# Patient Record
Sex: MALE | Race: WHITE | NOT HISPANIC OR LATINO | Employment: UNEMPLOYED | URBAN - METROPOLITAN AREA
[De-identification: names, ages, dates, MRNs, and addresses within clinical notes are randomized per-mention and may not be internally consistent; named-entity substitution may affect disease eponyms.]

---

## 2023-08-14 ENCOUNTER — APPOINTMENT (OUTPATIENT)
Dept: GENERAL RADIOLOGY | Facility: HOSPITAL | Age: 53
End: 2023-08-14
Payer: MEDICAID

## 2023-08-14 ENCOUNTER — APPOINTMENT (OUTPATIENT)
Dept: MRI IMAGING | Facility: HOSPITAL | Age: 53
End: 2023-08-14
Payer: MEDICAID

## 2023-08-14 ENCOUNTER — HOSPITAL ENCOUNTER (OUTPATIENT)
Facility: HOSPITAL | Age: 53
Setting detail: OBSERVATION
Discharge: HOME | End: 2023-08-15
Attending: EMERGENCY MEDICINE
Payer: MEDICAID

## 2023-08-14 ENCOUNTER — APPOINTMENT (OUTPATIENT)
Dept: CT IMAGING | Facility: HOSPITAL | Age: 53
End: 2023-08-14
Payer: MEDICAID

## 2023-08-14 DIAGNOSIS — R41.82 ALTERED MENTAL STATUS, UNSPECIFIED ALTERED MENTAL STATUS TYPE: Primary | ICD-10-CM

## 2023-08-14 LAB
ALBUMIN SERPL-MCNC: 4.4 G/DL (ref 3.5–5.2)
ALBUMIN/GLOB SERPL: 1.7 G/DL
ALP SERPL-CCNC: 62 U/L (ref 39–117)
ALT SERPL W P-5'-P-CCNC: 16 U/L (ref 1–41)
AMMONIA BLD-SCNC: 38 UMOL/L (ref 16–60)
AMPHET+METHAMPHET UR QL: NEGATIVE
ANION GAP SERPL CALCULATED.3IONS-SCNC: 11 MMOL/L (ref 5–15)
APAP SERPL-MCNC: <5 MCG/ML (ref 0–30)
AST SERPL-CCNC: 22 U/L (ref 1–40)
BACTERIA UR QL AUTO: ABNORMAL /HPF
BARBITURATES UR QL SCN: NEGATIVE
BASOPHILS # BLD AUTO: 0 10*3/MM3 (ref 0–0.2)
BASOPHILS # BLD AUTO: 0.1 10*3/MM3 (ref 0–0.2)
BASOPHILS NFR BLD AUTO: 0.5 % (ref 0–1.5)
BASOPHILS NFR BLD AUTO: 0.9 % (ref 0–1.5)
BENZODIAZ UR QL SCN: NEGATIVE
BILIRUB SERPL-MCNC: 0.6 MG/DL (ref 0–1.2)
BILIRUB UR QL STRIP: NEGATIVE
BUN SERPL-MCNC: 12 MG/DL (ref 6–20)
BUN/CREAT SERPL: 10 (ref 7–25)
CALCIUM SPEC-SCNC: 9.4 MG/DL (ref 8.6–10.5)
CANNABINOIDS SERPL QL: NEGATIVE
CHLORIDE SERPL-SCNC: 99 MMOL/L (ref 98–107)
CLARITY UR: CLEAR
CO2 SERPL-SCNC: 27 MMOL/L (ref 22–29)
COCAINE UR QL: NEGATIVE
COLOR UR: YELLOW
CREAT SERPL-MCNC: 1.2 MG/DL (ref 0.76–1.27)
D-LACTATE SERPL-SCNC: 1 MMOL/L (ref 0.5–2)
D-LACTATE SERPL-SCNC: 1 MMOL/L (ref 0.5–2)
D-LACTATE SERPL-SCNC: 2.1 MMOL/L (ref 0.5–2)
DEPRECATED RDW RBC AUTO: 45.1 FL (ref 37–54)
DEPRECATED RDW RBC AUTO: 46.8 FL (ref 37–54)
EGFRCR SERPLBLD CKD-EPI 2021: 72.8 ML/MIN/1.73
EOSINOPHIL # BLD AUTO: 0.2 10*3/MM3 (ref 0–0.4)
EOSINOPHIL # BLD AUTO: 0.2 10*3/MM3 (ref 0–0.4)
EOSINOPHIL NFR BLD AUTO: 1.9 % (ref 0.3–6.2)
EOSINOPHIL NFR BLD AUTO: 3.1 % (ref 0.3–6.2)
ERYTHROCYTE [DISTWIDTH] IN BLOOD BY AUTOMATED COUNT: 13.8 % (ref 12.3–15.4)
ERYTHROCYTE [DISTWIDTH] IN BLOOD BY AUTOMATED COUNT: 14 % (ref 12.3–15.4)
ERYTHROCYTE [SEDIMENTATION RATE] IN BLOOD: 9 MM/HR (ref 0–20)
ETHANOL UR QL: <0.01 %
GEN 5 2HR TROPONIN T REFLEX: 8 NG/L
GLOBULIN UR ELPH-MCNC: 2.6 GM/DL
GLUCOSE BLDC GLUCOMTR-MCNC: 114 MG/DL (ref 70–105)
GLUCOSE SERPL-MCNC: 101 MG/DL (ref 65–99)
GLUCOSE UR STRIP-MCNC: NEGATIVE MG/DL
HBA1C MFR BLD: 5.1 % (ref 4.8–5.6)
HCT VFR BLD AUTO: 48.8 % (ref 37.5–51)
HCT VFR BLD AUTO: 49.5 % (ref 37.5–51)
HGB BLD-MCNC: 16.6 G/DL (ref 13–17.7)
HGB BLD-MCNC: 16.7 G/DL (ref 13–17.7)
HGB UR QL STRIP.AUTO: ABNORMAL
HYALINE CASTS UR QL AUTO: ABNORMAL /LPF
KETONES UR QL STRIP: ABNORMAL
LEUKOCYTE ESTERASE UR QL STRIP.AUTO: NEGATIVE
LYMPHOCYTES # BLD AUTO: 2 10*3/MM3 (ref 0.7–3.1)
LYMPHOCYTES # BLD AUTO: 2.8 10*3/MM3 (ref 0.7–3.1)
LYMPHOCYTES NFR BLD AUTO: 32.9 % (ref 19.6–45.3)
LYMPHOCYTES NFR BLD AUTO: 32.9 % (ref 19.6–45.3)
MCH RBC QN AUTO: 31.5 PG (ref 26.6–33)
MCH RBC QN AUTO: 31.7 PG (ref 26.6–33)
MCHC RBC AUTO-ENTMCNC: 33.5 G/DL (ref 31.5–35.7)
MCHC RBC AUTO-ENTMCNC: 34.2 G/DL (ref 31.5–35.7)
MCV RBC AUTO: 92.2 FL (ref 79–97)
MCV RBC AUTO: 94.5 FL (ref 79–97)
METHADONE UR QL SCN: NEGATIVE
MONOCYTES # BLD AUTO: 0.6 10*3/MM3 (ref 0.1–0.9)
MONOCYTES # BLD AUTO: 0.8 10*3/MM3 (ref 0.1–0.9)
MONOCYTES NFR BLD AUTO: 10.1 % (ref 5–12)
MONOCYTES NFR BLD AUTO: 9.9 % (ref 5–12)
NEUTROPHILS NFR BLD AUTO: 3.2 10*3/MM3 (ref 1.7–7)
NEUTROPHILS NFR BLD AUTO: 4.6 10*3/MM3 (ref 1.7–7)
NEUTROPHILS NFR BLD AUTO: 53 % (ref 42.7–76)
NEUTROPHILS NFR BLD AUTO: 54.8 % (ref 42.7–76)
NITRITE UR QL STRIP: NEGATIVE
NRBC BLD AUTO-RTO: 0.2 /100 WBC (ref 0–0.2)
NRBC BLD AUTO-RTO: 0.2 /100 WBC (ref 0–0.2)
OPIATES UR QL: NEGATIVE
OXYCODONE UR QL SCN: NEGATIVE
PH UR STRIP.AUTO: 7.5 [PH] (ref 5–8)
PLATELET # BLD AUTO: 211 10*3/MM3 (ref 140–450)
PLATELET # BLD AUTO: 219 10*3/MM3 (ref 140–450)
PMV BLD AUTO: 8.3 FL (ref 6–12)
PMV BLD AUTO: 8.7 FL (ref 6–12)
POTASSIUM SERPL-SCNC: 3.8 MMOL/L (ref 3.5–5.2)
PROT SERPL-MCNC: 7 G/DL (ref 6–8.5)
PROT UR QL STRIP: ABNORMAL
RBC # BLD AUTO: 5.23 10*6/MM3 (ref 4.14–5.8)
RBC # BLD AUTO: 5.3 10*6/MM3 (ref 4.14–5.8)
RBC # UR STRIP: ABNORMAL /HPF
REF LAB TEST METHOD: ABNORMAL
SALICYLATES SERPL-MCNC: 0.4 MG/DL
SODIUM SERPL-SCNC: 137 MMOL/L (ref 136–145)
SP GR UR STRIP: 1.01 (ref 1–1.03)
SQUAMOUS #/AREA URNS HPF: ABNORMAL /HPF
TROPONIN T DELTA: 1 NG/L
TROPONIN T SERPL HS-MCNC: 7 NG/L
TSH SERPL DL<=0.05 MIU/L-ACNC: 4.51 UIU/ML (ref 0.27–4.2)
UROBILINOGEN UR QL STRIP: ABNORMAL
VALPROATE SERPL-MCNC: 38.1 MCG/ML (ref 50–125)
VIT B12 BLD-MCNC: 1416 PG/ML (ref 211–946)
WBC # UR STRIP: ABNORMAL /HPF
WBC NRBC COR # BLD: 6.1 10*3/MM3 (ref 3.4–10.8)
WBC NRBC COR # BLD: 8.5 10*3/MM3 (ref 3.4–10.8)

## 2023-08-14 PROCEDURE — 86038 ANTINUCLEAR ANTIBODIES: CPT | Performed by: PSYCHIATRY & NEUROLOGY

## 2023-08-14 PROCEDURE — 80307 DRUG TEST PRSMV CHEM ANLYZR: CPT | Performed by: PHYSICIAN ASSISTANT

## 2023-08-14 PROCEDURE — 85025 COMPLETE CBC W/AUTO DIFF WBC: CPT | Performed by: PHYSICIAN ASSISTANT

## 2023-08-14 PROCEDURE — 70551 MRI BRAIN STEM W/O DYE: CPT

## 2023-08-14 PROCEDURE — 99285 EMERGENCY DEPT VISIT HI MDM: CPT

## 2023-08-14 PROCEDURE — 82948 REAGENT STRIP/BLOOD GLUCOSE: CPT

## 2023-08-14 PROCEDURE — 86592 SYPHILIS TEST NON-TREP QUAL: CPT | Performed by: PSYCHIATRY & NEUROLOGY

## 2023-08-14 PROCEDURE — 86800 THYROGLOBULIN ANTIBODY: CPT | Performed by: PSYCHIATRY & NEUROLOGY

## 2023-08-14 PROCEDURE — 80179 DRUG ASSAY SALICYLATE: CPT | Performed by: PHYSICIAN ASSISTANT

## 2023-08-14 PROCEDURE — G0378 HOSPITAL OBSERVATION PER HR: HCPCS

## 2023-08-14 PROCEDURE — 80143 DRUG ASSAY ACETAMINOPHEN: CPT | Performed by: PHYSICIAN ASSISTANT

## 2023-08-14 PROCEDURE — 87484 EHRLICHA CHAFFEENSIS AMP PRB: CPT | Performed by: PHYSICIAN ASSISTANT

## 2023-08-14 PROCEDURE — 99214 OFFICE O/P EST MOD 30 MIN: CPT | Performed by: PSYCHIATRY & NEUROLOGY

## 2023-08-14 PROCEDURE — 36415 COLL VENOUS BLD VENIPUNCTURE: CPT | Performed by: PSYCHIATRY & NEUROLOGY

## 2023-08-14 PROCEDURE — 86376 MICROSOMAL ANTIBODY EACH: CPT | Performed by: PSYCHIATRY & NEUROLOGY

## 2023-08-14 PROCEDURE — 80048 BASIC METABOLIC PNL TOTAL CA: CPT | Performed by: PHYSICIAN ASSISTANT

## 2023-08-14 PROCEDURE — 85652 RBC SED RATE AUTOMATED: CPT | Performed by: PSYCHIATRY & NEUROLOGY

## 2023-08-14 PROCEDURE — P9612 CATHETERIZE FOR URINE SPEC: HCPCS

## 2023-08-14 PROCEDURE — 86618 LYME DISEASE ANTIBODY: CPT | Performed by: PSYCHIATRY & NEUROLOGY

## 2023-08-14 PROCEDURE — 25510000001 IOPAMIDOL PER 1 ML: Performed by: EMERGENCY MEDICINE

## 2023-08-14 PROCEDURE — 71045 X-RAY EXAM CHEST 1 VIEW: CPT

## 2023-08-14 PROCEDURE — 70450 CT HEAD/BRAIN W/O DYE: CPT

## 2023-08-14 PROCEDURE — 93005 ELECTROCARDIOGRAM TRACING: CPT | Performed by: PHYSICIAN ASSISTANT

## 2023-08-14 PROCEDURE — 83605 ASSAY OF LACTIC ACID: CPT | Performed by: INTERNAL MEDICINE

## 2023-08-14 PROCEDURE — 80050 GENERAL HEALTH PANEL: CPT | Performed by: PHYSICIAN ASSISTANT

## 2023-08-14 PROCEDURE — 87468 ANAPLSMA PHGCYTOPHLM AMP PRB: CPT | Performed by: PHYSICIAN ASSISTANT

## 2023-08-14 PROCEDURE — 82077 ASSAY SPEC XCP UR&BREATH IA: CPT | Performed by: PHYSICIAN ASSISTANT

## 2023-08-14 PROCEDURE — 86789 WEST NILE VIRUS ANTIBODY: CPT | Performed by: PSYCHIATRY & NEUROLOGY

## 2023-08-14 PROCEDURE — 80164 ASSAY DIPROPYLACETIC ACD TOT: CPT | Performed by: PSYCHIATRY & NEUROLOGY

## 2023-08-14 PROCEDURE — 82607 VITAMIN B-12: CPT | Performed by: PHYSICIAN ASSISTANT

## 2023-08-14 PROCEDURE — 86788 WEST NILE VIRUS AB IGM: CPT | Performed by: PSYCHIATRY & NEUROLOGY

## 2023-08-14 PROCEDURE — 70498 CT ANGIOGRAPHY NECK: CPT

## 2023-08-14 PROCEDURE — 82140 ASSAY OF AMMONIA: CPT | Performed by: PHYSICIAN ASSISTANT

## 2023-08-14 PROCEDURE — 80076 HEPATIC FUNCTION PANEL: CPT | Performed by: PHYSICIAN ASSISTANT

## 2023-08-14 PROCEDURE — 87798 DETECT AGENT NOS DNA AMP: CPT | Performed by: PHYSICIAN ASSISTANT

## 2023-08-14 PROCEDURE — 36415 COLL VENOUS BLD VENIPUNCTURE: CPT

## 2023-08-14 PROCEDURE — 84484 ASSAY OF TROPONIN QUANT: CPT | Performed by: PHYSICIAN ASSISTANT

## 2023-08-14 PROCEDURE — 83605 ASSAY OF LACTIC ACID: CPT | Performed by: PSYCHIATRY & NEUROLOGY

## 2023-08-14 PROCEDURE — 70496 CT ANGIOGRAPHY HEAD: CPT

## 2023-08-14 PROCEDURE — 83036 HEMOGLOBIN GLYCOSYLATED A1C: CPT | Performed by: PHYSICIAN ASSISTANT

## 2023-08-14 PROCEDURE — 83970 ASSAY OF PARATHORMONE: CPT | Performed by: PSYCHIATRY & NEUROLOGY

## 2023-08-14 PROCEDURE — 84146 ASSAY OF PROLACTIN: CPT | Performed by: PSYCHIATRY & NEUROLOGY

## 2023-08-14 PROCEDURE — 81001 URINALYSIS AUTO W/SCOPE: CPT | Performed by: PHYSICIAN ASSISTANT

## 2023-08-14 RX ORDER — DIVALPROEX SODIUM 500 MG/1
500 TABLET, EXTENDED RELEASE ORAL NIGHTLY
Status: DISCONTINUED | OUTPATIENT
Start: 2023-08-14 | End: 2023-08-15 | Stop reason: HOSPADM

## 2023-08-14 RX ORDER — ASPIRIN 81 MG/1
81 TABLET ORAL DAILY
COMMUNITY

## 2023-08-14 RX ORDER — ALUMINA, MAGNESIA, AND SIMETHICONE 2400; 2400; 240 MG/30ML; MG/30ML; MG/30ML
15 SUSPENSION ORAL EVERY 6 HOURS PRN
Status: DISCONTINUED | OUTPATIENT
Start: 2023-08-14 | End: 2023-08-15 | Stop reason: HOSPADM

## 2023-08-14 RX ORDER — AMLODIPINE BESYLATE 10 MG/1
10 TABLET ORAL DAILY
COMMUNITY

## 2023-08-14 RX ORDER — GUAIFENESIN 600 MG/1
1200 TABLET, EXTENDED RELEASE ORAL 2 TIMES DAILY PRN
COMMUNITY

## 2023-08-14 RX ORDER — ONDANSETRON 4 MG/1
4 TABLET, FILM COATED ORAL EVERY 6 HOURS PRN
Status: DISCONTINUED | OUTPATIENT
Start: 2023-08-14 | End: 2023-08-15 | Stop reason: HOSPADM

## 2023-08-14 RX ORDER — AMLODIPINE BESYLATE 5 MG/1
10 TABLET ORAL DAILY
Status: DISCONTINUED | OUTPATIENT
Start: 2023-08-14 | End: 2023-08-14

## 2023-08-14 RX ORDER — ZOLPIDEM TARTRATE 10 MG/1
10 TABLET ORAL NIGHTLY PRN
COMMUNITY

## 2023-08-14 RX ORDER — TIZANIDINE 4 MG/1
4 TABLET ORAL DAILY
Status: DISCONTINUED | OUTPATIENT
Start: 2023-08-14 | End: 2023-08-15 | Stop reason: HOSPADM

## 2023-08-14 RX ORDER — ACETAMINOPHEN 325 MG/1
650 TABLET ORAL EVERY 4 HOURS PRN
Status: DISCONTINUED | OUTPATIENT
Start: 2023-08-14 | End: 2023-08-15 | Stop reason: HOSPADM

## 2023-08-14 RX ORDER — ACETAMINOPHEN 650 MG/1
650 SUPPOSITORY RECTAL EVERY 4 HOURS PRN
Status: DISCONTINUED | OUTPATIENT
Start: 2023-08-14 | End: 2023-08-15 | Stop reason: HOSPADM

## 2023-08-14 RX ORDER — ACETAMINOPHEN 325 MG/1
650 TABLET ORAL EVERY 6 HOURS PRN
COMMUNITY

## 2023-08-14 RX ORDER — SODIUM CHLORIDE 0.9 % (FLUSH) 0.9 %
10 SYRINGE (ML) INJECTION EVERY 12 HOURS SCHEDULED
Status: DISCONTINUED | OUTPATIENT
Start: 2023-08-14 | End: 2023-08-15 | Stop reason: HOSPADM

## 2023-08-14 RX ORDER — LOVASTATIN 20 MG/1
20 TABLET ORAL NIGHTLY
COMMUNITY

## 2023-08-14 RX ORDER — ZOLPIDEM TARTRATE 5 MG/1
5 TABLET ORAL NIGHTLY PRN
Status: DISCONTINUED | OUTPATIENT
Start: 2023-08-14 | End: 2023-08-15 | Stop reason: HOSPADM

## 2023-08-14 RX ORDER — IBUPROFEN 200 MG
200 TABLET ORAL EVERY 6 HOURS PRN
COMMUNITY

## 2023-08-14 RX ORDER — OMEPRAZOLE 20 MG/1
20 CAPSULE, DELAYED RELEASE ORAL 2 TIMES DAILY
COMMUNITY

## 2023-08-14 RX ORDER — ALLOPURINOL 300 MG/1
300 TABLET ORAL DAILY
COMMUNITY

## 2023-08-14 RX ORDER — BISACODYL 10 MG
10 SUPPOSITORY, RECTAL RECTAL DAILY PRN
Status: DISCONTINUED | OUTPATIENT
Start: 2023-08-14 | End: 2023-08-15 | Stop reason: HOSPADM

## 2023-08-14 RX ORDER — PANTOPRAZOLE SODIUM 40 MG/1
40 TABLET, DELAYED RELEASE ORAL DAILY
Status: DISCONTINUED | OUTPATIENT
Start: 2023-08-14 | End: 2023-08-15 | Stop reason: HOSPADM

## 2023-08-14 RX ORDER — DIVALPROEX SODIUM 500 MG/1
500 TABLET, EXTENDED RELEASE ORAL DAILY
COMMUNITY
End: 2023-08-15 | Stop reason: HOSPADM

## 2023-08-14 RX ORDER — TESTOSTERONE CYPIONATE 200 MG/ML
75 VIAL (ML) INTRAMUSCULAR WEEKLY
COMMUNITY

## 2023-08-14 RX ORDER — SODIUM CHLORIDE 0.9 % (FLUSH) 0.9 %
10 SYRINGE (ML) INJECTION AS NEEDED
Status: DISCONTINUED | OUTPATIENT
Start: 2023-08-14 | End: 2023-08-15 | Stop reason: HOSPADM

## 2023-08-14 RX ORDER — DICLOFENAC SODIUM 75 MG/1
75 TABLET, DELAYED RELEASE ORAL 2 TIMES DAILY
COMMUNITY

## 2023-08-14 RX ORDER — ALLOPURINOL 300 MG/1
300 TABLET ORAL DAILY
Status: DISCONTINUED | OUTPATIENT
Start: 2023-08-14 | End: 2023-08-15 | Stop reason: HOSPADM

## 2023-08-14 RX ORDER — ASPIRIN 81 MG/1
81 TABLET ORAL DAILY
Status: DISCONTINUED | OUTPATIENT
Start: 2023-08-14 | End: 2023-08-15 | Stop reason: HOSPADM

## 2023-08-14 RX ORDER — SODIUM CHLORIDE 9 MG/ML
40 INJECTION, SOLUTION INTRAVENOUS AS NEEDED
Status: DISCONTINUED | OUTPATIENT
Start: 2023-08-14 | End: 2023-08-15 | Stop reason: HOSPADM

## 2023-08-14 RX ORDER — ONDANSETRON 2 MG/ML
4 INJECTION INTRAMUSCULAR; INTRAVENOUS EVERY 6 HOURS PRN
Status: DISCONTINUED | OUTPATIENT
Start: 2023-08-14 | End: 2023-08-15 | Stop reason: HOSPADM

## 2023-08-14 RX ORDER — LABETALOL HYDROCHLORIDE 5 MG/ML
10 INJECTION, SOLUTION INTRAVENOUS EVERY 6 HOURS PRN
Status: DISCONTINUED | OUTPATIENT
Start: 2023-08-14 | End: 2023-08-15 | Stop reason: HOSPADM

## 2023-08-14 RX ORDER — HYDROCHLOROTHIAZIDE 12.5 MG/1
12.5 TABLET ORAL DAILY
COMMUNITY

## 2023-08-14 RX ORDER — BISACODYL 5 MG/1
5 TABLET, DELAYED RELEASE ORAL DAILY PRN
Status: DISCONTINUED | OUTPATIENT
Start: 2023-08-14 | End: 2023-08-15 | Stop reason: HOSPADM

## 2023-08-14 RX ORDER — POLYETHYLENE GLYCOL 3350 17 G/17G
17 POWDER, FOR SOLUTION ORAL DAILY PRN
Status: DISCONTINUED | OUTPATIENT
Start: 2023-08-14 | End: 2023-08-15 | Stop reason: HOSPADM

## 2023-08-14 RX ORDER — NADOLOL 40 MG/1
40 TABLET ORAL DAILY
COMMUNITY

## 2023-08-14 RX ORDER — NADOLOL 20 MG/1
40 TABLET ORAL DAILY
Status: DISCONTINUED | OUTPATIENT
Start: 2023-08-14 | End: 2023-08-15 | Stop reason: HOSPADM

## 2023-08-14 RX ORDER — FLUCONAZOLE 150 MG/1
150 TABLET ORAL
COMMUNITY

## 2023-08-14 RX ORDER — ATORVASTATIN CALCIUM 10 MG/1
10 TABLET, FILM COATED ORAL NIGHTLY
Status: DISCONTINUED | OUTPATIENT
Start: 2023-08-14 | End: 2023-08-15 | Stop reason: HOSPADM

## 2023-08-14 RX ORDER — TIZANIDINE 4 MG/1
4 TABLET ORAL DAILY
COMMUNITY

## 2023-08-14 RX ORDER — ACETAMINOPHEN 160 MG/5ML
650 SOLUTION ORAL EVERY 4 HOURS PRN
Status: DISCONTINUED | OUTPATIENT
Start: 2023-08-14 | End: 2023-08-15 | Stop reason: HOSPADM

## 2023-08-14 RX ORDER — HYDROCHLOROTHIAZIDE 12.5 MG/1
12.5 TABLET ORAL DAILY
Status: DISCONTINUED | OUTPATIENT
Start: 2023-08-14 | End: 2023-08-15 | Stop reason: HOSPADM

## 2023-08-14 RX ORDER — CHOLECALCIFEROL (VITAMIN D3) 125 MCG
5 CAPSULE ORAL NIGHTLY PRN
Status: DISCONTINUED | OUTPATIENT
Start: 2023-08-14 | End: 2023-08-15 | Stop reason: HOSPADM

## 2023-08-14 RX ORDER — FLUTICASONE PROPIONATE 50 MCG
2 SPRAY, SUSPENSION (ML) NASAL DAILY
COMMUNITY

## 2023-08-14 RX ADMIN — ALLOPURINOL 300 MG: 300 TABLET ORAL at 17:49

## 2023-08-14 RX ADMIN — AMLODIPINE BESYLATE 10 MG: 5 TABLET ORAL at 17:48

## 2023-08-14 RX ADMIN — ATORVASTATIN CALCIUM 10 MG: 10 TABLET, FILM COATED ORAL at 20:26

## 2023-08-14 RX ADMIN — HYDROCHLOROTHIAZIDE 12.5 MG: 12.5 TABLET ORAL at 17:48

## 2023-08-14 RX ADMIN — ZOLPIDEM TARTRATE 5 MG: 5 TABLET ORAL at 22:07

## 2023-08-14 RX ADMIN — Medication 10 ML: at 20:26

## 2023-08-14 RX ADMIN — PANTOPRAZOLE SODIUM 40 MG: 40 TABLET, DELAYED RELEASE ORAL at 17:48

## 2023-08-14 RX ADMIN — TIZANIDINE 4 MG: 4 TABLET ORAL at 17:48

## 2023-08-14 RX ADMIN — IOPAMIDOL 100 ML: 755 INJECTION, SOLUTION INTRAVENOUS at 08:35

## 2023-08-14 RX ADMIN — ASPIRIN 81 MG: 81 TABLET, COATED ORAL at 17:49

## 2023-08-14 RX ADMIN — DIVALPROEX SODIUM 500 MG: 500 TABLET, EXTENDED RELEASE ORAL at 21:40

## 2023-08-14 RX ADMIN — SODIUM CHLORIDE 500 ML: 9 INJECTION, SOLUTION INTRAVENOUS at 19:50

## 2023-08-14 RX ADMIN — NADOLOL 40 MG: 20 TABLET ORAL at 17:48

## 2023-08-14 NOTE — PLAN OF CARE
Problem: Adult Inpatient Plan of Care  Goal: Plan of Care Review  Outcome: Ongoing, Progressing  Flowsheets (Taken 8/14/2023 1720)  Progress: no change  Plan of Care Reviewed With: patient  Outcome Evaluation: new admit  Goal: Patient-Specific Goal (Individualized)  Outcome: Ongoing, Progressing  Goal: Absence of Hospital-Acquired Illness or Injury  Outcome: Ongoing, Progressing  Goal: Optimal Comfort and Wellbeing  Outcome: Ongoing, Progressing  Goal: Readiness for Transition of Care  Outcome: Ongoing, Progressing   Goal Outcome Evaluation:  Plan of Care Reviewed With: patient        Progress: no change  Outcome Evaluation: new admit

## 2023-08-14 NOTE — H&P
[facility] Medicine Services  History & Physical    Patient Name: Spencer Brothers  : 1970  MRN: [id number]  Primary Care Physician:  Kaleigh Strickland APRN  Date of admission: 2023      Subjective      Chief Complaint: AMS    History of Present Illness: Spencer Brothers is a 53 y.o. male with a past medical history to include hypertension who presented to [facility] on 2023 complaining of dizziness and altered mental status x2 days.  He reports that he does have dizziness upon standing from a lying position.  He denies having any falls or injuring himself.  His wife is available to [facility] in [facility] [place].  She states that over the last couple of days he has become extremely fatigued.  She states that he has been increasingly confused to the point he is unable to unlock his phone and is having difficulty getting dressed.  They report he has not had any episodes like this in the past.  He denies having any nausea or vomiting.  He denies having any headache.  He denies having any chest pain, palpitations, syncopal episodes.  He endorses dizziness upon standing.  He denies having any shortness of air, abdominal pain, constipation or diarrhea.  He denies any urinary symptoms.  He denies having any melena or hematochezia.  He denies any lower extremity complaints.  We have been asked to meet this patient for further evaluation and treatment.      ROS 12 point ROS reviewed and negative except as mentioned above     Personal History     Past Medical History:   Diagnosis Date    Allergic     Hypertension        History reviewed. No pertinent surgical history.    Family History: family history includes Cancer in his mother; Diabetes in his maternal uncle; Heart disease in his mother. Otherwise pertinent FHx was reviewed and not pertinent to current issue.    Social History:  reports that he has never smoked. He does not have any smokeless tobacco history on file. He reports that he does not  currently use alcohol. He reports that he does not use drugs.    Home Medications:  Prior to Admission Medications       None              Allergies:  Allergies   Allergen Reactions    Lisinopril Angioedema       Objective      Vitals:   Temp:  [97.6 øF (36.4 øC)] 97.6 øF (36.4 øC)  Heart Rate:  [77-83] 82  Resp:  [8-18] 12  BP: (118-149)/() 130/100    Physical Exam  Constitutional:       General: He is not in acute distress.     Appearance: He is not ill-appearing.   HENT:      Head: Normocephalic and atraumatic.   Cardiovascular:      Rate and Rhythm: Normal rate and regular rhythm.      Pulses: Normal pulses.      Heart sounds: No murmur heard.  Pulmonary:      Effort: Pulmonary effort is normal. No respiratory distress.      Breath sounds: Normal breath sounds.   Abdominal:      General: Bowel sounds are normal.      Palpations: Abdomen is soft.      Tenderness: There is no abdominal tenderness.   Musculoskeletal:         General: Normal range of motion.      Cervical back: Normal range of motion and neck supple.      Right lower leg: No edema.      Left lower leg: No edema.   Skin:     General: Skin is warm and dry.   Neurological:      General: No focal deficit present.      Mental Status: He is alert and oriented to person, place, and time.   Psychiatric:         Mood and Affect: Mood normal.         Behavior: Behavior normal.          Result Review    Result Review:  I have personally reviewed the results from the time of this admission to 8/14/2023 12:37 EDT and agree with these findings:  [x]  Laboratory  []  Microbiology  [x]  Radiology  [x]  EKG/Telemetry   [x]  Cardiology/Vascular   []  Pathology  []  Old records  []  Other:    In the emergency department, vital signs stable.  96% on room air.  Urine drug screen negative.  UA negative for nitrate 2+ protein.  Glucose 101.  Creatinine 1.20.  Sodium 137.  White blood count 6.10.  Hemoglobin 16.6.  Platelets 211.  Salicylate 0.4.  Ethanol <0.010.  Acetaminophen <5.0.    Chest xray Somewhat limited study. No acute process identified.     CT head w/o contrast No acute intracranial abnormality.     CTA H/N Essentially normal CT angiogram of the head and neck. There is no evidence of flow-limiting stenosis, large vessel occlusion or aneurysm.     EKG Sinus rhythm Probable left ventricular hypertrophy Anterior Q waves, possibly due to LVH No previous ECG available for comparison      Assessment & Plan        Active Hospital Problems:  Active Hospital Problems    Diagnosis     **Altered mental status      Plan:     Home meds not verified at the time of assessment and plan    Altered mental status   -Chest xray Somewhat limited study. No acute process identified.   -CT head w/o contrast No acute intracranial abnormality.   -CTA H/N Essentially normal CT angiogram of the head and neck. There is no evidence of flow-limiting stenosis, large vessel occlusion or aneurysm.   -EKG Sinus rhythm Probable left ventricular hypertrophy Anterior Q waves, possibly due to LVH No previous ECG available for comparison  -UA negative for nitrate, 2+ protein  -Salicylate 0.4.  Ethanol <0.010. Acetaminophen <5.0.  -UDS negative   -Check TSH, B12, A1c, troponin, hep panel, tick panel and ammonia   -WBC 6.10  -Fall precaution  -MRI, pending   -Neurology consult     Dizziness of unknown etiology  -Patient does have history of seasonal allergies  -Orthostatic pressures   -ECHO  -EKG Sinus rhythm Probable left ventricular hypertrophy Anterior Q waves, possibly due to LVH No previous ECG available for comparison  -CT head w/o contrast No acute intracranial abnormality.   -CTA H/N Essentially normal CT angiogram of the head and neck. There is no evidence of flow-limiting stenosis, large vessel occlusion or aneurysm.     Hypertension   - /100  -home meds, restart after for verification.  -As needed labetalol    DVT prophylaxis:  Mechanical DVT prophylaxis orders are present.    CODE  STATUS:    Level Of Support Discussed With: Patient  Code Status (Patient has no pulse and is not breathing): CPR (Attempt to Resuscitate)  Medical Interventions (Patient has pulse or is breathing): Full Support    Admission Status:  I believe this patient meets observation status.    I discussed the patient's findings and my recommendations with patient.    Signature: Electronically signed by Jen Hartley PA-C, 08/14/23, 12:37 EDT.  [facility]
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
## 2023-08-14 NOTE — SIGNIFICANT NOTE
Patient left his room to leave [facility], he has now returned. Will order psych eval for possible psych episode with history of Bipolar disorder.

## 2023-08-15 ENCOUNTER — APPOINTMENT (OUTPATIENT)
Dept: CARDIOLOGY | Facility: HOSPITAL | Age: 53
End: 2023-08-15
Payer: MEDICAID

## 2023-08-15 VITALS
TEMPERATURE: 98.2 F | BODY MASS INDEX: 26.67 KG/M2 | WEIGHT: 176 LBS | SYSTOLIC BLOOD PRESSURE: 160 MMHG | OXYGEN SATURATION: 96 % | RESPIRATION RATE: 16 BRPM | DIASTOLIC BLOOD PRESSURE: 98 MMHG | HEIGHT: 68 IN | HEART RATE: 80 BPM

## 2023-08-15 PROBLEM — R41.82 ALTERED MENTAL STATUS: Status: RESOLVED | Noted: 2023-08-14 | Resolved: 2023-08-15

## 2023-08-15 LAB
ALBUMIN SERPL-MCNC: 4.1 G/DL (ref 3.5–5.2)
ALP SERPL-CCNC: 63 U/L (ref 39–117)
ALT SERPL W P-5'-P-CCNC: 16 U/L (ref 1–41)
ANION GAP SERPL CALCULATED.3IONS-SCNC: 12 MMOL/L (ref 5–15)
AST SERPL-CCNC: 20 U/L (ref 1–40)
BH CV ECHO MEAS - ACS: 2.13 CM
BH CV ECHO MEAS - AO MAX PG: 4.3 MMHG
BH CV ECHO MEAS - AO MEAN PG: 3.4 MMHG
BH CV ECHO MEAS - AO ROOT DIAM: 3.7 CM
BH CV ECHO MEAS - AO V2 MAX: 102.5 CM/SEC
BH CV ECHO MEAS - AO V2 VTI: 20.8 CM
BH CV ECHO MEAS - AVA(I,D): 2.41 CM2
BH CV ECHO MEAS - EDV(CUBED): 120.3 ML
BH CV ECHO MEAS - EDV(MOD-SP4): 81.3 ML
BH CV ECHO MEAS - EF(MOD-BP): 54 %
BH CV ECHO MEAS - EF(MOD-SP4): 54 %
BH CV ECHO MEAS - ESV(CUBED): 43.6 ML
BH CV ECHO MEAS - ESV(MOD-SP4): 37.4 ML
BH CV ECHO MEAS - FS: 28.7 %
BH CV ECHO MEAS - IVS/LVPW: 0.91 CM
BH CV ECHO MEAS - IVSD: 0.77 CM
BH CV ECHO MEAS - LA DIMENSION: 4.2 CM
BH CV ECHO MEAS - LV DIASTOLIC VOL/BSA (35-75): 42 CM2
BH CV ECHO MEAS - LV MASS(C)D: 135.1 GRAMS
BH CV ECHO MEAS - LV MAX PG: 2.28 MMHG
BH CV ECHO MEAS - LV MEAN PG: 1.36 MMHG
BH CV ECHO MEAS - LV SYSTOLIC VOL/BSA (12-30): 19.3 CM2
BH CV ECHO MEAS - LV V1 MAX: 75.4 CM/SEC
BH CV ECHO MEAS - LV V1 VTI: 13.2 CM
BH CV ECHO MEAS - LVIDD: 4.9 CM
BH CV ECHO MEAS - LVIDS: 3.5 CM
BH CV ECHO MEAS - LVOT AREA: 3.8 CM2
BH CV ECHO MEAS - LVOT DIAM: 2.2 CM
BH CV ECHO MEAS - LVPWD: 0.85 CM
BH CV ECHO MEAS - MV A MAX VEL: 95.7 CM/SEC
BH CV ECHO MEAS - MV DEC SLOPE: 365.8 CM/SEC2
BH CV ECHO MEAS - MV DEC TIME: 0.13 MSEC
BH CV ECHO MEAS - MV E MAX VEL: 48.8 CM/SEC
BH CV ECHO MEAS - MV E/A: 0.51
BH CV ECHO MEAS - MV MAX PG: 3 MMHG
BH CV ECHO MEAS - MV MEAN PG: 1.19 MMHG
BH CV ECHO MEAS - MV V2 VTI: 12.2 CM
BH CV ECHO MEAS - MVA(VTI): 4.1 CM2
BH CV ECHO MEAS - PA ACC TIME: 0.05 SEC
BH CV ECHO MEAS - PA V2 MAX: 92 CM/SEC
BH CV ECHO MEAS - PULM A REVS DUR: 0.11 SEC
BH CV ECHO MEAS - PULM A REVS VEL: 26.4 CM/SEC
BH CV ECHO MEAS - PULM DIAS VEL: 36.9 CM/SEC
BH CV ECHO MEAS - PULM S/D: 1.17
BH CV ECHO MEAS - PULM SYS VEL: 43.4 CM/SEC
BH CV ECHO MEAS - RAP SYSTOLE: 3 MMHG
BH CV ECHO MEAS - RV MAX PG: 1.77 MMHG
BH CV ECHO MEAS - RV V1 MAX: 66.5 CM/SEC
BH CV ECHO MEAS - RV V1 VTI: 10 CM
BH CV ECHO MEAS - RVDD: 3.3 CM
BH CV ECHO MEAS - RVSP: 15.3 MMHG
BH CV ECHO MEAS - SI(MOD-SP4): 22.7 ML/M2
BH CV ECHO MEAS - SV(LVOT): 50.2 ML
BH CV ECHO MEAS - SV(MOD-SP4): 44 ML
BH CV ECHO MEAS - TR MAX PG: 12.3 MMHG
BH CV ECHO MEAS - TR MAX VEL: 175.7 CM/SEC
BILIRUB CONJ SERPL-MCNC: <0.2 MG/DL (ref 0–0.3)
BILIRUB INDIRECT SERPL-MCNC: NORMAL MG/DL
BILIRUB SERPL-MCNC: 0.4 MG/DL (ref 0–1.2)
BUN SERPL-MCNC: 14 MG/DL (ref 6–20)
BUN/CREAT SERPL: 10.8 (ref 7–25)
CALCIUM SPEC-SCNC: 9.1 MG/DL (ref 8.6–10.5)
CALCIUM SPEC-SCNC: 9.3 MG/DL (ref 8.6–10.5)
CHLORIDE SERPL-SCNC: 99 MMOL/L (ref 98–107)
CO2 SERPL-SCNC: 26 MMOL/L (ref 22–29)
CREAT SERPL-MCNC: 1.3 MG/DL (ref 0.76–1.27)
EGFRCR SERPLBLD CKD-EPI 2021: 66.1 ML/MIN/1.73
GLUCOSE SERPL-MCNC: 99 MG/DL (ref 65–99)
POTASSIUM SERPL-SCNC: 3.5 MMOL/L (ref 3.5–5.2)
POTASSIUM SERPL-SCNC: 4.5 MMOL/L (ref 3.5–5.2)
PROLACTIN SERPL-MCNC: 27.6 NG/ML (ref 4.04–15.2)
PROT SERPL-MCNC: 6.8 G/DL (ref 6–8.5)
PTH-INTACT SERPL-MCNC: 70.7 PG/ML (ref 15–65)
QT INTERVAL: 386 MS
RPR SER QL: NORMAL
SODIUM SERPL-SCNC: 137 MMOL/L (ref 136–145)

## 2023-08-15 PROCEDURE — 84132 ASSAY OF SERUM POTASSIUM: CPT | Performed by: INTERNAL MEDICINE

## 2023-08-15 PROCEDURE — G0378 HOSPITAL OBSERVATION PER HR: HCPCS

## 2023-08-15 PROCEDURE — 25010000002 LABETALOL 5 MG/ML SOLUTION: Performed by: PHYSICIAN ASSISTANT

## 2023-08-15 PROCEDURE — 93306 TTE W/DOPPLER COMPLETE: CPT | Performed by: INTERNAL MEDICINE

## 2023-08-15 PROCEDURE — 96374 THER/PROPH/DIAG INJ IV PUSH: CPT

## 2023-08-15 PROCEDURE — 99214 OFFICE O/P EST MOD 30 MIN: CPT | Performed by: PSYCHIATRY & NEUROLOGY

## 2023-08-15 PROCEDURE — 63710000001 ONDANSETRON PER 8 MG: Performed by: PHYSICIAN ASSISTANT

## 2023-08-15 PROCEDURE — 99222 1ST HOSP IP/OBS MODERATE 55: CPT

## 2023-08-15 PROCEDURE — 93306 TTE W/DOPPLER COMPLETE: CPT

## 2023-08-15 RX ORDER — DIVALPROEX SODIUM 500 MG/1
500 TABLET, EXTENDED RELEASE ORAL 2 TIMES DAILY
Qty: 60 TABLET | Refills: 0 | Status: SHIPPED | OUTPATIENT
Start: 2023-08-15 | End: 2023-09-14

## 2023-08-15 RX ORDER — POTASSIUM CHLORIDE 20 MEQ/1
40 TABLET, EXTENDED RELEASE ORAL EVERY 4 HOURS
Status: COMPLETED | OUTPATIENT
Start: 2023-08-15 | End: 2023-08-15

## 2023-08-15 RX ADMIN — ASPIRIN 81 MG: 81 TABLET, COATED ORAL at 08:01

## 2023-08-15 RX ADMIN — ONDANSETRON HYDROCHLORIDE 4 MG: 4 TABLET, FILM COATED ORAL at 12:40

## 2023-08-15 RX ADMIN — ACETAMINOPHEN 650 MG: 325 TABLET, FILM COATED ORAL at 08:01

## 2023-08-15 RX ADMIN — PANTOPRAZOLE SODIUM 40 MG: 40 TABLET, DELAYED RELEASE ORAL at 08:00

## 2023-08-15 RX ADMIN — POTASSIUM CHLORIDE 40 MEQ: 1500 TABLET, EXTENDED RELEASE ORAL at 00:37

## 2023-08-15 RX ADMIN — Medication 10 ML: at 08:02

## 2023-08-15 RX ADMIN — HYDROCHLOROTHIAZIDE 12.5 MG: 12.5 TABLET ORAL at 08:01

## 2023-08-15 RX ADMIN — NADOLOL 40 MG: 20 TABLET ORAL at 08:00

## 2023-08-15 RX ADMIN — POTASSIUM CHLORIDE 40 MEQ: 1500 TABLET, EXTENDED RELEASE ORAL at 05:48

## 2023-08-15 RX ADMIN — ALLOPURINOL 300 MG: 300 TABLET ORAL at 08:01

## 2023-08-15 RX ADMIN — Medication 10 MG: at 08:01

## 2023-08-15 RX ADMIN — TIZANIDINE 4 MG: 4 TABLET ORAL at 08:01

## 2023-08-15 RX ADMIN — ACETAMINOPHEN 650 MG: 325 TABLET, FILM COATED ORAL at 11:58

## 2023-08-15 NOTE — PROGRESS NOTES
LOS: 0 days       Subjective   Pt is feeling better and getting ready for discharge already changed into street clothes.  He reports there are times that he finds it difficult to concentrate that occurs out of the blue.  He has hx of migraines and is on VPA.     Objective     Vital Signs  Temp:  [97.5 øF (36.4 øC)-98.3 øF (36.8 øC)] 98.2 øF (36.8 øC)  Heart Rate:  [77-89] 80  Resp:  [8-18] 16  BP: ()/() 160/98  Intake & Output (last day)         08/14 0701  08/15 0700 08/15 0701  08/16 0700    P.O. 240     Total Intake(mL/kg) 240 (3)     Urine (mL/kg/hr) 1250 (0.7)     Total Output 1250     Net -1010                    Physical Exam:      Cognition:   Alert and oriented x 3  Fund of knowledge diminished.  Could not remember president Malcolm or Ananya Malagon, needed cuing with first name before he remembered  Concentration diminished.  Could not spell world backwards, and struggled to spell world -> ZEE  Language normal, no aphasia     Cranial nerves:     II - Pupils bilaterally briskly reactive to light  No new visual field deficits;  III,IV,VI: Intact  V: Normal facial sensations  VII: Intact   VIII: No new hearing changes  IX, X, XI: Normal gag and shoulder shrug;  XII: Tongue is in the midline.     Sensory:  Intact to light touch in all extremities.      Motor: Strength 5/5 bilaterally upper and lower extremities. No involuntary movements present. Normal tone and bulk.  Reflexes: Plantars are flexor.     Cerebellar: FTN intact     Gait and balance: Deferred         Results Review:     I reviewed the patient's new clinical results.    Lab Results (last 24 hours)       Procedure Component Value Units Date/Time    RPR [[id number]]  (Normal) Collected: 08/14/23 1834    Specimen: Blood Updated: 08/15/23 0142     RPR Non-Reactive    Prolactin [[id number]]  (Abnormal) Collected: 08/14/23 1834    Specimen: Blood Updated: 08/15/23 0030     Prolactin 27.60 ng/mL     Narrative:      Results may be falsely  decreased if patient taking Biotin.     PTH, Intact & Calcium [[id number]]  (Abnormal) Collected: 08/14/23 1833    Specimen: Blood Updated: 08/15/23 0028     PTH, Intact 70.7 pg/mL      Calcium 9.3 mg/dL     Narrative:      PTH results may be falsely decreased if patient taking Biotin.        Basic Metabolic Panel [[id number]]  (Abnormal) Collected: 08/14/23 2317    Specimen: Blood Updated: 08/15/23 0003     Glucose 99 mg/dL      BUN 14 mg/dL      Creatinine 1.30 mg/dL      Sodium 137 mmol/L      Potassium 3.5 mmol/L      Comment: Slight hemolysis detected by analyzer. Results may be affected.        Chloride 99 mmol/L      CO2 26.0 mmol/L      Calcium 9.1 mg/dL      BUN/Creatinine Ratio 10.8     Anion Gap 12.0 mmol/L      eGFR 66.1 mL/min/1.73     Narrative:      GFR Normal >60  Chronic Kidney Disease <60  Kidney Failure <15      Hepatic Function Panel [[id number]] Collected: 08/14/23 2317    Specimen: Blood Updated: 08/15/23 0003     Total Protein 6.8 g/dL      Albumin 4.1 g/dL      ALT (SGPT) 16 U/L      AST (SGOT) 20 U/L      Alkaline Phosphatase 63 U/L      Total Bilirubin 0.4 mg/dL      Bilirubin, Direct <0.2 mg/dL      Bilirubin, Indirect --     Comment: Unable to calculate       Lactic Acid, Plasma [[id number]]  (Normal) Collected: 08/14/23 2317    Specimen: Blood Updated: 08/14/23 2358     Lactate 1.0 mmol/L     CBC & Differential [[id number]]  (Normal) Collected: 08/14/23 2317    Specimen: Blood Updated: 08/14/23 2342    Narrative:      The following orders were created for panel order CBC & Differential.  Procedure                               Abnormality         Status                     ---------                               -----------         ------                     CBC Auto Differential[[id number]]        Normal              Final result                 Please view results for these tests on the individual orders.    CBC Auto Differential [[id number]]  (Normal) Collected: 08/14/23 2317    Specimen:  Blood Updated: 08/14/23 2342     WBC 8.50 10*3/mm3      RBC 5.30 10*6/mm3      Hemoglobin 16.7 g/dL      Hematocrit 48.8 %      MCV 92.2 fL      MCH 31.5 pg      MCHC 34.2 g/dL      RDW 14.0 %      RDW-SD 46.8 fl      MPV 8.3 fL      Platelets 219 10*3/mm3      Neutrophil % 54.8 %      Lymphocyte % 32.9 %      Monocyte % 9.9 %      Eosinophil % 1.9 %      Basophil % 0.5 %      Neutrophils, Absolute 4.60 10*3/mm3      Lymphocytes, Absolute 2.80 10*3/mm3      Monocytes, Absolute 0.80 10*3/mm3      Eosinophils, Absolute 0.20 10*3/mm3      Basophils, Absolute 0.00 10*3/mm3      nRBC 0.2 /100 WBC     STAT Lactic Acid, Reflex [[id number]]  (Normal) Collected: 08/14/23 2123    Specimen: Blood Updated: 08/14/23 2155     Lactate 1.0 mmol/L     Lactic Acid, Plasma [[id number]]  (Abnormal) Collected: 08/14/23 1834    Specimen: Blood Updated: 08/14/23 1913     Lactate 2.1 mmol/L     High Sensitivity Troponin T 2Hr [[id number]]  (Normal) Collected: 08/14/23 1834    Specimen: Blood Updated: 08/14/23 1910     HS Troponin T 8 ng/L      Troponin T Delta 1 ng/L     Narrative:      High Sensitive Troponin T Reference Range:  <10.0 ng/L- Negative Female for AMI  <15.0 ng/L- Negative Male for AMI  >=10 - Abnormal Female indicating possible myocardial injury.  >=15 - Abnormal Male indicating possible myocardial injury.   Clinicians would have to utilize clinical acumen, EKG, Troponin, and serial changes to determine if it is an Acute Myocardial Infarction or myocardial injury due to an underlying chronic condition.         Thyroid Peroxidase Antibody [[id number]] Collected: 08/14/23 1833    Specimen: Blood Updated: 08/14/23 1848    West Nile Antibodies, IgG & IgM [[id number]] Collected: 08/14/23 1833    Specimen: Blood Updated: 08/14/23 1848    Thyroglobulin Antibody [[id number]] Collected: 08/14/23 1834    Specimen: Blood Updated: 08/14/23 1848    Lyme Disease Total Antibody With Reflex to Immunoassay [[id number]] Collected: 08/14/23 1836     Specimen: Blood Updated: 08/14/23 1848    KATHY [[id number]] Collected: 08/14/23 1833    Specimen: Blood Updated: 08/14/23 1848    Vitamin B12 [[id number]]  (Abnormal) Collected: 08/14/23 1147    Specimen: Blood Updated: 08/14/23 1657     Vitamin B-12 1,416 pg/mL     Narrative:      Results may be falsely increased if patient taking Biotin.      Valproic Acid Level, Total [[id number]]  (Abnormal) Collected: 08/14/23 1147    Specimen: Blood Updated: 08/14/23 1338     Valproic Acid 38.1 mcg/mL     Narrative:      Therapeutic Ranges for Valproic Acid    Epilepsy:       mcg/ml  Bipolar/Janny  up to 125 mcg/ml      Sedimentation Rate [[id number]]  (Normal) Collected: 08/14/23 1147    Specimen: Blood Updated: 08/14/23 1324     Sed Rate 9 mm/hr     TSH [[id number]]  (Abnormal) Collected: 08/14/23 1147    Specimen: Blood Updated: 08/14/23 1222     TSH 4.510 uIU/mL     High Sensitivity Troponin T [[id number]]  (Normal) Collected: 08/14/23 1147    Specimen: Blood Updated: 08/14/23 1222     HS Troponin T 7 ng/L     Narrative:      High Sensitive Troponin T Reference Range:  <10.0 ng/L- Negative Female for AMI  <15.0 ng/L- Negative Male for AMI  >=10 - Abnormal Female indicating possible myocardial injury.  >=15 - Abnormal Male indicating possible myocardial injury.   Clinicians would have to utilize clinical acumen, EKG, Troponin, and serial changes to determine if it is an Acute Myocardial Infarction or myocardial injury due to an underlying chronic condition.         Ammonia [[id number]]  (Normal) Collected: 08/14/23 1147    Specimen: Blood Updated: 08/14/23 1215     Ammonia 38 umol/L     Hemoglobin A1c [[id number]]  (Normal) Collected: 08/14/23 1147    Specimen: Blood Updated: 08/14/23 1212     Hemoglobin A1C 5.10 %     Ehrlichia Profile DNA PCR [[id number]] Collected: 08/14/23 1147    Specimen: Blood Updated: 08/14/23 1154    Rickettsia Species DNA, Real-Time PCR [[id number]] Collected: 08/14/23 1147    Specimen: Blood Updated:  08/14/23 1154           Imaging Results (Last 24 Hours)       Procedure Component Value Units Date/Time    MRI Brain Without Contrast [[id number]] Collected: 08/14/23 1153     Updated: 08/14/23 1159    Narrative:      MRI BRAIN WO CONTRAST    Date of Exam: 8/14/2023 11:05 AM EDT    Indication: confusion x 2 days.     Comparison: CT head 8/14/2023.    Technique:  Routine multiplanar/multisequence sequence images of the brain were obtained without contrast administration.      Findings:  No acute infarct is present on diffusion weighted sequences. Midline structures are normal and the craniocervical junction appears satisfactory. Evaluation is somewhat degraded by patient motion. There is no evidence of intracranial hemorrhage, mass or   mass effect. The ventricles are normal in size and configuration, accounting for some mild surrounding volume loss. The orbits are normal. The paranasal sinuses demonstrate a small retention cyst within the left maxillary sinus. Intracranial arterial   flow voids are maintained.      Impression:      Impression:  Mildly motion degraded exam demonstrating no evidence of acute infarct, hemorrhage, mass or mass effect.        Electronically Signed: Gabe Leblanc MD    8/14/2023 11:57 AM EDT    Workstation ID: [id number]               Medication Review:     Assessment & Plan     Spencer Brothers is a 53 y.o. male with hx of intractable migraines, HTN, HLD, L knee osteoarthritis, now with increased confusion over last 2 days of unclear etiology  CT head, CTA h/n neg.      IMPRESSION:  Encephalopathy       PLAN:   MRI brain without was completed -> Neg   LA 2.1 -> 1.0, PRL 27.6 -> BOTH elevated, concerning for seizure.    Pt had been on  mg QD for migraines, level subtherapeutic for seizures 38.1.  Will increase to  mg BID or VPA XR 1000 mg qhs     B12 1416, NH3 38, TSH 4.5, ESR 9, KATHY, lyme pend, WNV pend, HSV pend, TPO, ATG, PTH 70.7 -> likely related to low vit D 11.5, VPA  level low 38.1      UA, C&S, U tox -ve    5.    VTE ppx       ACTION PTS  1.  VPA XR 1000 mg QD  2.  Vit D repletion (level 11.5)  3.  F/U outpt neurologist -> re: adjustment of VPA for migraine and sz, periodic CBC, LFT's and NH3 while on VPA  4.  F/U outpt PCP -> health maintenance, follow vit D, PTH levels      Lyndsay Ching MD  08/15/23  10:03 EDT      Time: 25, CT 15

## 2023-08-15 NOTE — PLAN OF CARE
Problem: Adult Inpatient Plan of Care  Goal: Plan of Care Review  Outcome: Met  Flowsheets (Taken 8/15/2023 0955)  Progress: improving  Plan of Care Reviewed With:   patient   spouse  Goal: Patient-Specific Goal (Individualized)  Outcome: Met  Goal: Absence of Hospital-Acquired Illness or Injury  Outcome: Met  Intervention: Identify and Manage Fall Risk  Recent Flowsheet Documentation  Taken 8/15/2023 0815 by Marion Odell LPN  Safety Promotion/Fall Prevention:   clutter free environment maintained   assistive device/personal items within reach   fall prevention program maintained   nonskid shoes/slippers when out of bed   room organization consistent   safety round/check completed  Intervention: Prevent Skin Injury  Recent Flowsheet Documentation  Taken 8/15/2023 0815 by Marion Odell LPN  Body Position: position changed independently  Skin Protection:   adhesive use limited   tubing/devices free from skin contact  Intervention: Prevent and Manage VTE (Venous Thromboembolism) Risk  Recent Flowsheet Documentation  Taken 8/15/2023 0815 by Marion Odell LPN  Activity Management:   up ad terry   ambulated in room  VTE Prevention/Management:   bilateral   sequential compression devices off   patient refused intervention  Range of Motion: active ROM (range of motion) encouraged  Intervention: Prevent Infection  Recent Flowsheet Documentation  Taken 8/15/2023 0815 by Marion Odell LPN  Infection Prevention:   environmental surveillance performed   hand hygiene promoted   rest/sleep promoted   single patient room provided  Goal: Optimal Comfort and Wellbeing  Outcome: Met  Intervention: Provide Person-Centered Care  Recent Flowsheet Documentation  Taken 8/15/2023 0815 by Marion Odell LPN  Trust Relationship/Rapport:   care explained   thoughts/feelings acknowledged  Goal: Readiness for Transition of Care  Outcome: Met     Problem: Syncope  Goal: Absence of Syncopal Symptoms  Outcome: Met  Intervention: Manage Effect  of Syncopal Symptoms  Recent Flowsheet Documentation  Taken 8/15/2023 0815 by Marion Odell LPN  Supportive Measures: active listening utilized     Problem: Fall Injury Risk  Goal: Absence of Fall and Fall-Related Injury  Outcome: Met  Intervention: Identify and Manage Contributors  Recent Flowsheet Documentation  Taken 8/15/2023 0815 by Marion Odell LPN  Medication Review/Management: medications reviewed  Intervention: Promote Injury-Free Environment  Recent Flowsheet Documentation  Taken 8/15/2023 0815 by Marion Odell LPN  Safety Promotion/Fall Prevention:   clutter free environment maintained   assistive device/personal items within reach   fall prevention program maintained   nonskid shoes/slippers when out of bed   room organization consistent   safety round/check completed   Goal Outcome Evaluation:  Plan of Care Reviewed With: patient, spouse        Progress: improving
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
## 2023-08-15 NOTE — PLAN OF CARE
Problem: Adult Inpatient Plan of Care  Goal: Plan of Care Review  Outcome: Ongoing, Progressing  Goal: Patient-Specific Goal (Individualized)  Outcome: Ongoing, Progressing  Goal: Absence of Hospital-Acquired Illness or Injury  Outcome: Ongoing, Progressing  Intervention: Identify and Manage Fall Risk  Recent Flowsheet Documentation  Taken 8/15/2023 0432 by Salima Silva RN  Safety Promotion/Fall Prevention:   assistive device/personal items within reach   clutter free environment maintained   lighting adjusted   fall prevention program maintained   mobility aid in reach   safety round/check completed   nonskid shoes/slippers when out of bed   room organization consistent  Taken 8/15/2023 0200 by Salima Silva RN  Safety Promotion/Fall Prevention:   assistive device/personal items within reach   clutter free environment maintained   fall prevention program maintained   lighting adjusted   mobility aid in reach   nonskid shoes/slippers when out of bed   room organization consistent   safety round/check completed  Taken 8/15/2023 0000 by Salima Silva RN  Safety Promotion/Fall Prevention:   assistive device/personal items within reach   clutter free environment maintained   fall prevention program maintained   lighting adjusted   mobility aid in reach   nonskid shoes/slippers when out of bed   room organization consistent   safety round/check completed  Taken 8/14/2023 2200 by Salima Silva RN  Safety Promotion/Fall Prevention:   assistive device/personal items within reach   clutter free environment maintained   lighting adjusted   mobility aid in reach   nonskid shoes/slippers when out of bed   room organization consistent   safety round/check completed  Taken 8/14/2023 2135 by Salima Silva RN  Safety Promotion/Fall Prevention:   assistive device/personal items within reach   clutter free environment maintained   lighting adjusted   mobility aid in reach   nonskid shoes/slippers when out of bed   room organization  consistent   safety round/check completed  Intervention: Prevent Skin Injury  Recent Flowsheet Documentation  Taken 8/15/2023 0432 by Salima Silva RN  Body Position: position changed independently  Skin Protection:   adhesive use limited   tubing/devices free from skin contact   transparent dressing maintained  Taken 8/15/2023 0000 by Salima Silva RN  Body Position: position changed independently  Skin Protection:   adhesive use limited   transparent dressing maintained   tubing/devices free from skin contact  Taken 8/14/2023 2135 by Salima Silva RN  Body Position: position changed independently  Skin Protection:   adhesive use limited   transparent dressing maintained   tubing/devices free from skin contact  Intervention: Prevent and Manage VTE (Venous Thromboembolism) Risk  Recent Flowsheet Documentation  Taken 8/15/2023 0432 by Salima Silva RN  Range of Motion: active ROM (range of motion) encouraged  Taken 8/15/2023 0000 by Salima Silva RN  Range of Motion: active ROM (range of motion) encouraged  Taken 8/14/2023 2135 by Salima Silva RN  Activity Management:   up ad terry   ambulated in room  VTE Prevention/Management:   bilateral   sequential compression devices on  Range of Motion: active ROM (range of motion) encouraged  Intervention: Prevent Infection  Recent Flowsheet Documentation  Taken 8/15/2023 0432 by Salima Silva RN  Infection Prevention:   equipment surfaces disinfected   hand hygiene promoted   personal protective equipment utilized   rest/sleep promoted   single patient room provided  Taken 8/15/2023 0200 by Salima Silva RN  Infection Prevention:   equipment surfaces disinfected   hand hygiene promoted   personal protective equipment utilized   single patient room provided   rest/sleep promoted  Taken 8/15/2023 0000 by Salima Silva RN  Infection Prevention:   equipment surfaces disinfected   personal protective equipment utilized   hand hygiene promoted   rest/sleep promoted   single patient room  provided  Taken 8/14/2023 2200 by Salima Silva RN  Infection Prevention:   equipment surfaces disinfected   hand hygiene promoted   personal protective equipment utilized   rest/sleep promoted   single patient room provided  Taken 8/14/2023 2135 by Salima Silva RN  Infection Prevention:   equipment surfaces disinfected   hand hygiene promoted   personal protective equipment utilized   rest/sleep promoted   single patient room provided  Goal: Optimal Comfort and Wellbeing  Outcome: Ongoing, Progressing  Intervention: Provide Person-Centered Care  Recent Flowsheet Documentation  Taken 8/14/2023 2135 by Salima Silva RN  Trust Relationship/Rapport:   care explained   thoughts/feelings acknowledged  Goal: Readiness for Transition of Care  Outcome: Ongoing, Progressing     Problem: Syncope  Goal: Absence of Syncopal Symptoms  Outcome: Ongoing, Progressing  Intervention: Manage Effect of Syncopal Symptoms  Recent Flowsheet Documentation  Taken 8/14/2023 2135 by Salima Silva RN  Supportive Measures: active listening utilized     Problem: Fall Injury Risk  Goal: Absence of Fall and Fall-Related Injury  Outcome: Ongoing, Progressing  Intervention: Identify and Manage Contributors  Recent Flowsheet Documentation  Taken 8/15/2023 0432 by Salima Silva RN  Medication Review/Management: medications reviewed  Taken 8/15/2023 0200 by Salima Silva RN  Medication Review/Management: medications reviewed  Taken 8/15/2023 0000 by Salima Silva RN  Medication Review/Management: medications reviewed  Taken 8/14/2023 2200 by Salima Silva RN  Medication Review/Management: medications reviewed  Taken 8/14/2023 2135 by Salima Silva RN  Medication Review/Management: medications reviewed  Intervention: Promote Injury-Free Environment  Recent Flowsheet Documentation  Taken 8/15/2023 0432 by Salima Silva RN  Safety Promotion/Fall Prevention:   assistive device/personal items within reach   clutter free environment maintained   lighting adjusted   fall  prevention program maintained   mobility aid in reach   safety round/check completed   nonskid shoes/slippers when out of bed   room organization consistent  Taken 8/15/2023 0200 by Salima Silva RN  Safety Promotion/Fall Prevention:   assistive device/personal items within reach   clutter free environment maintained   fall prevention program maintained   lighting adjusted   mobility aid in reach   nonskid shoes/slippers when out of bed   room organization consistent   safety round/check completed  Taken 8/15/2023 0000 by Salima Silva RN  Safety Promotion/Fall Prevention:   assistive device/personal items within reach   clutter free environment maintained   fall prevention program maintained   lighting adjusted   mobility aid in reach   nonskid shoes/slippers when out of bed   room organization consistent   safety round/check completed  Taken 8/14/2023 2200 by Salima Silva, RN  Safety Promotion/Fall Prevention:   assistive device/personal items within reach   clutter free environment maintained   lighting adjusted   mobility aid in reach   nonskid shoes/slippers when out of bed   room organization consistent   safety round/check completed  Taken 8/14/2023 2135 by Salima Silva, RN  Safety Promotion/Fall Prevention:   assistive device/personal items within reach   clutter free environment maintained   lighting adjusted   mobility aid in reach   nonskid shoes/slippers when out of bed   room organization consistent   safety round/check completed   Goal Outcome Evaluation:

## 2023-08-16 LAB
ANA SER QL: NEGATIVE
B BURGDOR IGG+IGM SER QL IA: NEGATIVE
THYROGLOB AB SERPL-ACNC: <1 IU/ML (ref 0–0.9)
THYROPEROXIDASE AB SERPL-ACNC: <9 IU/ML (ref 0–34)

## 2023-08-17 LAB
A PHAGOCYTOPH DNA BLD QL NAA+PROBE: NEGATIVE
E CHAFFEENSIS DNA BLD QL NAA+PROBE: NEGATIVE
RICKETTSIA RICKETTSII DNA, RT: NOT DETECTED

## 2023-08-18 LAB
WNV IGG SER QL IA: NEGATIVE
WNV IGM SER QL IA: NEGATIVE

## 2023-10-30 NOTE — Clinical Note
Level of Care: Med/Surg [1]   Diagnosis: Altered mental status [780.97.ICD-9-CM]   Admitting Physician: JOE QUINN [[id number]]   Attending Physician: JOE QUINN [[id number]]  
DISCHARGE

## 2024-02-05 ENCOUNTER — HOSPITAL ENCOUNTER (INPATIENT)
Facility: HOSPITAL | Age: 54
LOS: 1 days | Discharge: HOME | End: 2024-02-06
Attending: EMERGENCY MEDICINE | Admitting: HOSPITALIST
Payer: MEDICAID

## 2024-02-05 ENCOUNTER — APPOINTMENT (OUTPATIENT)
Dept: CT IMAGING | Facility: HOSPITAL | Age: 54
End: 2024-02-05
Payer: MEDICAID

## 2024-02-05 DIAGNOSIS — I82.412 ACUTE DEEP VEIN THROMBOSIS (DVT) OF FEMORAL VEIN OF LEFT LOWER EXTREMITY: ICD-10-CM

## 2024-02-05 DIAGNOSIS — I82.4Y2 ACUTE DEEP VEIN THROMBOSIS (DVT) OF PROXIMAL VEIN OF LEFT LOWER EXTREMITY: Primary | ICD-10-CM

## 2024-02-05 PROBLEM — I82.409 DVT (DEEP VENOUS THROMBOSIS): Status: ACTIVE | Noted: 2024-02-05

## 2024-02-05 LAB
ACT BLD: 255 SECONDS (ref 89–137)
ANION GAP SERPL CALCULATED.3IONS-SCNC: 11 MMOL/L (ref 5–15)
APTT PPP: 25.6 SECONDS (ref 61–76.5)
BASOPHILS # BLD AUTO: 0 10*3/MM3 (ref 0–0.2)
BASOPHILS NFR BLD AUTO: 0.7 % (ref 0–1.5)
BUN SERPL-MCNC: 11 MG/DL (ref 6–20)
BUN/CREAT SERPL: 10.3 (ref 7–25)
CALCIUM SPEC-SCNC: 9.2 MG/DL (ref 8.6–10.5)
CHLORIDE SERPL-SCNC: 102 MMOL/L (ref 98–107)
CO2 SERPL-SCNC: 28 MMOL/L (ref 22–29)
CREAT SERPL-MCNC: 1.07 MG/DL (ref 0.76–1.27)
DEPRECATED RDW RBC AUTO: 56 FL (ref 37–54)
EGFRCR SERPLBLD CKD-EPI 2021: 83.5 ML/MIN/1.73
EOSINOPHIL # BLD AUTO: 0.2 10*3/MM3 (ref 0–0.4)
EOSINOPHIL NFR BLD AUTO: 2.3 % (ref 0.3–6.2)
ERYTHROCYTE [DISTWIDTH] IN BLOOD BY AUTOMATED COUNT: 16.4 % (ref 12.3–15.4)
FLUAV RNA RESP QL NAA+PROBE: NOT DETECTED
FLUBV RNA RESP QL NAA+PROBE: NOT DETECTED
GLUCOSE SERPL-MCNC: 99 MG/DL (ref 65–99)
HCT VFR BLD AUTO: 42.7 % (ref 37.5–51)
HGB BLD-MCNC: 14.1 G/DL (ref 13–17.7)
INR PPP: 1.12 (ref 0.93–1.1)
LYMPHOCYTES # BLD AUTO: 2.2 10*3/MM3 (ref 0.7–3.1)
LYMPHOCYTES NFR BLD AUTO: 32.9 % (ref 19.6–45.3)
MCH RBC QN AUTO: 32.7 PG (ref 26.6–33)
MCHC RBC AUTO-ENTMCNC: 33 G/DL (ref 31.5–35.7)
MCV RBC AUTO: 99.1 FL (ref 79–97)
MONOCYTES # BLD AUTO: 0.7 10*3/MM3 (ref 0.1–0.9)
MONOCYTES NFR BLD AUTO: 9.7 % (ref 5–12)
NEUTROPHILS NFR BLD AUTO: 3.7 10*3/MM3 (ref 1.7–7)
NEUTROPHILS NFR BLD AUTO: 54.4 % (ref 42.7–76)
NRBC BLD AUTO-RTO: 0.1 /100 WBC (ref 0–0.2)
PLATELET # BLD AUTO: 179 10*3/MM3 (ref 140–450)
PMV BLD AUTO: 8.5 FL (ref 6–12)
POTASSIUM SERPL-SCNC: 3.4 MMOL/L (ref 3.5–5.2)
PROTHROMBIN TIME: 12.1 SECONDS (ref 9.6–11.7)
RBC # BLD AUTO: 4.3 10*6/MM3 (ref 4.14–5.8)
RSV RNA RESP QL NAA+PROBE: NOT DETECTED
SARS-COV-2 RNA RESP QL NAA+PROBE: NOT DETECTED
SODIUM SERPL-SCNC: 141 MMOL/L (ref 136–145)
WBC NRBC COR # BLD AUTO: 6.8 10*3/MM3 (ref 3.4–10.8)

## 2024-02-05 PROCEDURE — 96365 THER/PROPH/DIAG IV INF INIT: CPT

## 2024-02-05 PROCEDURE — B51C1ZZ FLUOROSCOPY OF LEFT LOWER EXTREMITY VEINS USING LOW OSMOLAR CONTRAST: ICD-10-PCS | Performed by: INTERNAL MEDICINE

## 2024-02-05 PROCEDURE — 25510000001 IOPAMIDOL PER 1 ML: Performed by: INTERNAL MEDICINE

## 2024-02-05 PROCEDURE — 37187 VENOUS MECH THROMBECTOMY: CPT | Performed by: INTERNAL MEDICINE

## 2024-02-05 PROCEDURE — 96366 THER/PROPH/DIAG IV INF ADDON: CPT

## 2024-02-05 PROCEDURE — C1887 CATHETER, GUIDING: HCPCS | Performed by: INTERNAL MEDICINE

## 2024-02-05 PROCEDURE — 25010000002 HEPARIN (PORCINE) 25000-0.45 UT/250ML-% SOLUTION: Performed by: INTERNAL MEDICINE

## 2024-02-05 PROCEDURE — 99223 1ST HOSP IP/OBS HIGH 75: CPT | Performed by: INTERNAL MEDICINE

## 2024-02-05 PROCEDURE — 37248 TRLUML BALO ANGIOP 1ST VEIN: CPT | Performed by: INTERNAL MEDICINE

## 2024-02-05 PROCEDURE — 067N3ZZ DILATION OF LEFT FEMORAL VEIN, PERCUTANEOUS APPROACH: ICD-10-PCS | Performed by: INTERNAL MEDICINE

## 2024-02-05 PROCEDURE — C1753 CATH, INTRAVAS ULTRASOUND: HCPCS | Performed by: INTERNAL MEDICINE

## 2024-02-05 PROCEDURE — 25010000002 MIDAZOLAM PER 1 MG: Performed by: INTERNAL MEDICINE

## 2024-02-05 PROCEDURE — 25010000002 FENTANYL CITRATE (PF) 50 MCG/ML SOLUTION: Performed by: INTERNAL MEDICINE

## 2024-02-05 PROCEDURE — 99285 EMERGENCY DEPT VISIT HI MDM: CPT

## 2024-02-05 PROCEDURE — 37252 INTRVASC US NONCORONARY 1ST: CPT | Performed by: INTERNAL MEDICINE

## 2024-02-05 PROCEDURE — 067G3ZZ DILATION OF LEFT EXTERNAL ILIAC VEIN, PERCUTANEOUS APPROACH: ICD-10-PCS | Performed by: INTERNAL MEDICINE

## 2024-02-05 PROCEDURE — C1894 INTRO/SHEATH, NON-LASER: HCPCS | Performed by: INTERNAL MEDICINE

## 2024-02-05 PROCEDURE — C1769 GUIDE WIRE: HCPCS | Performed by: INTERNAL MEDICINE

## 2024-02-05 PROCEDURE — 25010000002 HEPARIN (PORCINE) PER 1000 UNITS: Performed by: INTERNAL MEDICINE

## 2024-02-05 PROCEDURE — 25010000002 HEPARIN (PORCINE) 25000-0.45 UT/250ML-% SOLUTION: Performed by: EMERGENCY MEDICINE

## 2024-02-05 PROCEDURE — 85610 PROTHROMBIN TIME: CPT | Performed by: EMERGENCY MEDICINE

## 2024-02-05 PROCEDURE — B54CZZ3 ULTRASONOGRAPHY OF LEFT LOWER EXTREMITY VEINS, INTRAVASCULAR: ICD-10-PCS | Performed by: INTERNAL MEDICINE

## 2024-02-05 PROCEDURE — 87637 SARSCOV2&INF A&B&RSV AMP PRB: CPT | Performed by: EMERGENCY MEDICINE

## 2024-02-05 PROCEDURE — 85730 THROMBOPLASTIN TIME PARTIAL: CPT | Performed by: EMERGENCY MEDICINE

## 2024-02-05 PROCEDURE — C1757 CATH, THROMBECTOMY/EMBOLECT: HCPCS | Performed by: INTERNAL MEDICINE

## 2024-02-05 PROCEDURE — 36010 PLACE CATHETER IN VEIN: CPT | Performed by: INTERNAL MEDICINE

## 2024-02-05 PROCEDURE — 06CG3ZZ EXTIRPATION OF MATTER FROM LEFT EXTERNAL ILIAC VEIN, PERCUTANEOUS APPROACH: ICD-10-PCS | Performed by: INTERNAL MEDICINE

## 2024-02-05 PROCEDURE — 80048 BASIC METABOLIC PNL TOTAL CA: CPT | Performed by: EMERGENCY MEDICINE

## 2024-02-05 PROCEDURE — C1757 CATH, THROMBECTOMY/EMBOLECT: HCPCS

## 2024-02-05 PROCEDURE — 75635 CT ANGIO ABDOMINAL ARTERIES: CPT

## 2024-02-05 PROCEDURE — 99153 MOD SED SAME PHYS/QHP EA: CPT | Performed by: INTERNAL MEDICINE

## 2024-02-05 PROCEDURE — 99152 MOD SED SAME PHYS/QHP 5/>YRS: CPT | Performed by: INTERNAL MEDICINE

## 2024-02-05 PROCEDURE — C1725 CATH, TRANSLUMIN NON-LASER: HCPCS | Performed by: INTERNAL MEDICINE

## 2024-02-05 PROCEDURE — 85347 COAGULATION TIME ACTIVATED: CPT

## 2024-02-05 PROCEDURE — 85025 COMPLETE CBC W/AUTO DIFF WBC: CPT | Performed by: EMERGENCY MEDICINE

## 2024-02-05 PROCEDURE — 06CN3ZZ EXTIRPATION OF MATTER FROM LEFT FEMORAL VEIN, PERCUTANEOUS APPROACH: ICD-10-PCS | Performed by: INTERNAL MEDICINE

## 2024-02-05 PROCEDURE — 25510000001 IOPAMIDOL PER 1 ML: Performed by: EMERGENCY MEDICINE

## 2024-02-05 RX ORDER — ACETAMINOPHEN 325 MG/1
650 TABLET ORAL EVERY 4 HOURS PRN
Status: DISCONTINUED | OUTPATIENT
Start: 2024-02-05 | End: 2024-02-05 | Stop reason: SDUPTHER

## 2024-02-05 RX ORDER — ATORVASTATIN CALCIUM 10 MG/1
10 TABLET, FILM COATED ORAL DAILY
Status: DISCONTINUED | OUTPATIENT
Start: 2024-02-05 | End: 2024-02-06 | Stop reason: HOSPADM

## 2024-02-05 RX ORDER — ACETAMINOPHEN 650 MG/1
650 SUPPOSITORY RECTAL EVERY 4 HOURS PRN
Status: DISCONTINUED | OUTPATIENT
Start: 2024-02-05 | End: 2024-02-06 | Stop reason: HOSPADM

## 2024-02-05 RX ORDER — ACETAMINOPHEN 325 MG/1
650 TABLET ORAL EVERY 4 HOURS PRN
Status: DISCONTINUED | OUTPATIENT
Start: 2024-02-05 | End: 2024-02-06 | Stop reason: HOSPADM

## 2024-02-05 RX ORDER — ACETAMINOPHEN 160 MG/5ML
650 SOLUTION ORAL EVERY 4 HOURS PRN
Status: DISCONTINUED | OUTPATIENT
Start: 2024-02-05 | End: 2024-02-06 | Stop reason: HOSPADM

## 2024-02-05 RX ORDER — CELECOXIB 200 MG/1
200 CAPSULE ORAL DAILY
COMMUNITY
End: 2024-02-06 | Stop reason: HOSPADM

## 2024-02-05 RX ORDER — ONDANSETRON 4 MG/1
4 TABLET, ORALLY DISINTEGRATING ORAL EVERY 6 HOURS PRN
Status: DISCONTINUED | OUTPATIENT
Start: 2024-02-05 | End: 2024-02-06 | Stop reason: HOSPADM

## 2024-02-05 RX ORDER — DIVALPROEX SODIUM 500 MG/1
500 TABLET, EXTENDED RELEASE ORAL 2 TIMES DAILY
COMMUNITY

## 2024-02-05 RX ORDER — DIPHENHYDRAMINE HCL 25 MG
25 CAPSULE ORAL EVERY 6 HOURS PRN
Status: DISCONTINUED | OUTPATIENT
Start: 2024-02-05 | End: 2024-02-06 | Stop reason: HOSPADM

## 2024-02-05 RX ORDER — AMLODIPINE BESYLATE 5 MG/1
10 TABLET ORAL DAILY
Status: DISCONTINUED | OUTPATIENT
Start: 2024-02-05 | End: 2024-02-06

## 2024-02-05 RX ORDER — ASPIRIN 81 MG/1
81 TABLET ORAL DAILY
Status: DISCONTINUED | OUTPATIENT
Start: 2024-02-05 | End: 2024-02-05

## 2024-02-05 RX ORDER — PANTOPRAZOLE SODIUM 40 MG/1
40 TABLET, DELAYED RELEASE ORAL
Status: DISCONTINUED | OUTPATIENT
Start: 2024-02-06 | End: 2024-02-06 | Stop reason: HOSPADM

## 2024-02-05 RX ORDER — SODIUM CHLORIDE 9 MG/ML
40 INJECTION, SOLUTION INTRAVENOUS AS NEEDED
Status: DISCONTINUED | OUTPATIENT
Start: 2024-02-05 | End: 2024-02-06 | Stop reason: HOSPADM

## 2024-02-05 RX ORDER — SODIUM CHLORIDE 0.9 % (FLUSH) 0.9 %
10 SYRINGE (ML) INJECTION AS NEEDED
Status: DISCONTINUED | OUTPATIENT
Start: 2024-02-05 | End: 2024-02-06 | Stop reason: HOSPADM

## 2024-02-05 RX ORDER — LIDOCAINE HYDROCHLORIDE 20 MG/ML
INJECTION, SOLUTION INFILTRATION; PERINEURAL
Status: DISCONTINUED | OUTPATIENT
Start: 2024-02-05 | End: 2024-02-05 | Stop reason: HOSPADM

## 2024-02-05 RX ORDER — ONDANSETRON 2 MG/ML
4 INJECTION INTRAMUSCULAR; INTRAVENOUS EVERY 6 HOURS PRN
Status: DISCONTINUED | OUTPATIENT
Start: 2024-02-05 | End: 2024-02-06 | Stop reason: HOSPADM

## 2024-02-05 RX ORDER — NITROGLYCERIN 0.4 MG/1
0.4 TABLET SUBLINGUAL
Status: DISCONTINUED | OUTPATIENT
Start: 2024-02-05 | End: 2024-02-06 | Stop reason: HOSPADM

## 2024-02-05 RX ORDER — ALLOPURINOL 300 MG/1
300 TABLET ORAL DAILY
Status: DISCONTINUED | OUTPATIENT
Start: 2024-02-06 | End: 2024-02-06 | Stop reason: HOSPADM

## 2024-02-05 RX ORDER — HYDROCODONE BITARTRATE AND ACETAMINOPHEN 5; 325 MG/1; MG/1
1 TABLET ORAL EVERY 6 HOURS PRN
Status: DISCONTINUED | OUTPATIENT
Start: 2024-02-05 | End: 2024-02-06 | Stop reason: HOSPADM

## 2024-02-05 RX ORDER — HYDRALAZINE HYDROCHLORIDE 100 MG/1
100 TABLET, FILM COATED ORAL 3 TIMES DAILY
COMMUNITY

## 2024-02-05 RX ORDER — ZOLPIDEM TARTRATE 5 MG/1
5 TABLET ORAL NIGHTLY PRN
Status: DISCONTINUED | OUTPATIENT
Start: 2024-02-05 | End: 2024-02-06 | Stop reason: HOSPADM

## 2024-02-05 RX ORDER — BISACODYL 10 MG
10 SUPPOSITORY, RECTAL RECTAL DAILY PRN
Status: DISCONTINUED | OUTPATIENT
Start: 2024-02-05 | End: 2024-02-06 | Stop reason: HOSPADM

## 2024-02-05 RX ORDER — CLONIDINE HYDROCHLORIDE 0.2 MG/1
0.2 TABLET ORAL 2 TIMES DAILY
COMMUNITY

## 2024-02-05 RX ORDER — BISACODYL 5 MG/1
5 TABLET, DELAYED RELEASE ORAL DAILY PRN
Status: DISCONTINUED | OUTPATIENT
Start: 2024-02-05 | End: 2024-02-06 | Stop reason: HOSPADM

## 2024-02-05 RX ORDER — HYDROCHLOROTHIAZIDE 12.5 MG/1
12.5 TABLET ORAL DAILY
Status: DISCONTINUED | OUTPATIENT
Start: 2024-02-05 | End: 2024-02-06

## 2024-02-05 RX ORDER — HEPARIN SODIUM 10000 [USP'U]/100ML
17.1 INJECTION, SOLUTION INTRAVENOUS
Status: DISPENSED | OUTPATIENT
Start: 2024-02-05 | End: 2024-02-05

## 2024-02-05 RX ORDER — FENTANYL CITRATE 50 UG/ML
INJECTION, SOLUTION INTRAMUSCULAR; INTRAVENOUS
Status: DISCONTINUED | OUTPATIENT
Start: 2024-02-05 | End: 2024-02-05 | Stop reason: HOSPADM

## 2024-02-05 RX ORDER — AMOXICILLIN 250 MG
2 CAPSULE ORAL 2 TIMES DAILY
Status: DISCONTINUED | OUTPATIENT
Start: 2024-02-05 | End: 2024-02-06 | Stop reason: HOSPADM

## 2024-02-05 RX ORDER — POLYETHYLENE GLYCOL 3350 17 G/17G
17 POWDER, FOR SOLUTION ORAL DAILY PRN
Status: DISCONTINUED | OUTPATIENT
Start: 2024-02-05 | End: 2024-02-06 | Stop reason: HOSPADM

## 2024-02-05 RX ORDER — SODIUM CHLORIDE 0.9 % (FLUSH) 0.9 %
10 SYRINGE (ML) INJECTION EVERY 12 HOURS SCHEDULED
Status: DISCONTINUED | OUTPATIENT
Start: 2024-02-05 | End: 2024-02-06 | Stop reason: HOSPADM

## 2024-02-05 RX ORDER — NADOLOL 20 MG/1
40 TABLET ORAL DAILY
Status: DISCONTINUED | OUTPATIENT
Start: 2024-02-05 | End: 2024-02-06 | Stop reason: HOSPADM

## 2024-02-05 RX ORDER — MIDAZOLAM HYDROCHLORIDE 1 MG/ML
INJECTION INTRAMUSCULAR; INTRAVENOUS
Status: DISCONTINUED | OUTPATIENT
Start: 2024-02-05 | End: 2024-02-05 | Stop reason: HOSPADM

## 2024-02-05 RX ORDER — HEPARIN SODIUM 1000 [USP'U]/ML
INJECTION, SOLUTION INTRAVENOUS; SUBCUTANEOUS
Status: DISCONTINUED | OUTPATIENT
Start: 2024-02-05 | End: 2024-02-05 | Stop reason: HOSPADM

## 2024-02-05 RX ORDER — POTASSIUM CHLORIDE 20 MEQ/1
40 TABLET, EXTENDED RELEASE ORAL EVERY 4 HOURS
Status: COMPLETED | OUTPATIENT
Start: 2024-02-05 | End: 2024-02-06

## 2024-02-05 RX ADMIN — APIXABAN 10 MG: 5 TABLET, FILM COATED ORAL at 21:13

## 2024-02-05 RX ADMIN — NADOLOL 40 MG: 20 TABLET ORAL at 21:13

## 2024-02-05 RX ADMIN — DOCUSATE SODIUM 50MG AND SENNOSIDES 8.6MG 2 TABLET: 8.6; 5 TABLET, FILM COATED ORAL at 21:14

## 2024-02-05 RX ADMIN — Medication 10 ML: at 21:14

## 2024-02-05 RX ADMIN — ATORVASTATIN CALCIUM 10 MG: 10 TABLET, FILM COATED ORAL at 21:13

## 2024-02-05 RX ADMIN — IOPAMIDOL 100 ML: 755 INJECTION, SOLUTION INTRAVENOUS at 16:07

## 2024-02-05 RX ADMIN — HYDROCODONE BITARTRATE AND ACETAMINOPHEN 1 TABLET: 5; 325 TABLET ORAL at 21:14

## 2024-02-05 RX ADMIN — AMLODIPINE BESYLATE 10 MG: 5 TABLET ORAL at 21:13

## 2024-02-05 RX ADMIN — POTASSIUM CHLORIDE 40 MEQ: 1500 TABLET, EXTENDED RELEASE ORAL at 21:37

## 2024-02-05 RX ADMIN — HEPARIN SODIUM 17.1 UNITS/KG/HR: 10000 INJECTION, SOLUTION INTRAVENOUS at 16:16

## 2024-02-05 NOTE — Clinical Note
Level of Care: Telemetry [5]   Diagnosis: DVT (deep venous thrombosis) [203239]   Certification: I Certify That Inpatient Hospital Services Are Medically Necessary For Greater Than 2 Midnights

## 2024-02-05 NOTE — Clinical Note
First balloon inflation max pressure = 6 roosevelt. First balloon inflation duration = 40 seconds. Second inflation of balloon - Max pressure = 5 roosevelt. 2nd Inflation of balloon - Duration = 25 seconds. Third inflation of balloon - Max pressure = 5 roosevelt. 3rd Inflation of balloon - Duration = 40 seconds. Fourth inflation of balloon - Max pressure = 7 roosevelt. 4th Inflation of balloon - Duration = 20 seconds.

## 2024-02-05 NOTE — Clinical Note
Prepped with: ChloraPrep. The site was clipped. The patient was draped in a sterile fashion. Lt. Lower leg/popliteal

## 2024-02-05 NOTE — CONSULTS
Referring Provider: Romulo Colon MD    Reason for Consultation: DVT      Patient Care Team:  Kaleigh Strickland APRN as PCP - General (Nurse Practitioner)      SUBJECTIVE     Chief Complaint: Left leg pain and swelling    History of present illness:  Spencer Brothers is a 53 y.o. male with hypertension, hyperlipidemia, gout who presents to the hospital with complaints of left lower extremity pain and swelling.  He has been having difficulty with ambulation and pain ongoing for the last 1 year.  Recently the pain migrated to his groin and also his lower extremity started to swell up with more redness.  He had an ultrasound of lower extremity which showed extensive thrombus in the entire venous system of left leg.  A CT abdomen showed extensive thrombus in the femoral vein with no evidence of external compression.  Interventional cardiology has been consulted to evaluate him for mechanical thrombectomy given severity of symptoms, young age and e heavy thrombus burden.  Of note he denies any chest pain, shortness of breath.    Review of systems:    Constitutional: No weakness, fatigue, fever, rigors, chills   Eyes: No vision changes, eye pain   ENT/oropharynx: No difficulty swallowing, sore throat, epistaxis, changes in hearing   Cardiovascular: No chest pain, chest tightness, palpitations, paroxysmal nocturnal dyspnea, orthopnea, diaphoresis, dizziness / syncopal episode   Respiratory: No shortness of breath, dyspnea on exertion, cough, wheezing, hemoptysis   Gastrointestinal: No abdominal pain, nausea, vomiting, diarrhea, bloody stools   Genitourinary: No hematuria, dysuria   Neurological: No headache, tremors, numbness, one-sided weakness    Musculoskeletal: No cramps, myalgias, joint pain, joint swelling   Integument: No rash, edema        Personal History:      Past Medical History:   Diagnosis Date    Allergic     Hypertension        No past surgical history on file.    Family History   Problem Relation Age  of Onset    Heart disease Mother     Cancer Mother     Diabetes Maternal Uncle        Social History     Tobacco Use    Smoking status: Never    Smokeless tobacco: Never   Vaping Use    Vaping Use: Never used   Substance Use Topics    Alcohol use: Yes     Comment: socially on occasion    Drug use: Never        Home meds:  Prior to Admission medications    Medication Sig Start Date End Date Taking? Authorizing Provider   acetaminophen (TYLENOL) 325 MG tablet Take 2 tablets by mouth Every 6 (Six) Hours As Needed for Mild Pain.   Yes Holly Guillen MD   allopurinol (ZYLOPRIM) 300 MG tablet Take 1 tablet by mouth Daily.   Yes Holly Guillen MD   celecoxib (CeleBREX) 200 MG capsule Take 1 capsule by mouth Daily.   Yes Holly Guillen MD   Cholecalciferol (VITAMIN D-3 PO) Take 1 capsule by mouth Daily.   Yes Holly Guillen MD   cloNIDine (CATAPRES) 0.2 MG tablet Take 1 tablet by mouth 2 (Two) Times a Day.   Yes Holly Guillen MD   Cyanocobalamin (VITAMIN B 12 PO) Take 1 tablet by mouth Daily.   Yes Holly Guillen MD   diclofenac (VOLTAREN) 50 MG EC tablet Take 1 tablet by mouth 2 (Two) Times a Day As Needed (Headache).   Yes Holly Guillen MD   divalproex (DEPAKOTE ER) 500 MG 24 hr tablet Take 1 tablet by mouth 2 (Two) Times a Day.   Yes Holly Guillen MD   hydrALAZINE (APRESOLINE) 100 MG tablet Take 1 tablet by mouth 3 (Three) Times a Day.   Yes Holly Guillen MD   hydroCHLOROthiazide (HYDRODIURIL) 25 MG tablet Take 1 tablet by mouth Daily.   Yes Holly Guillen MD   lovastatin (MEVACOR) 20 MG tablet Take 1 tablet by mouth Every Night.   Yes Holly Guillen MD   nadolol (CORGARD) 40 MG tablet Take 1 tablet by mouth Daily.   Yes Holly Guillen MD   omeprazole (priLOSEC) 20 MG capsule Take 1 capsule by mouth 2 (Two) Times a Day.   Yes Holly Guillen MD   Testosterone Cypionate 200 MG/ML solution Inject 75 mL as directed 1 (One) Time Per  "Week. Sundays   Yes Holly Guillen MD   tiZANidine (ZANAFLEX) 4 MG tablet Take 1 tablet by mouth 3 (Three) Times a Day As Needed for Muscle Spasms.   Yes Holly Guillen MD   zolpidem (AMBIEN) 10 MG tablet Take 1 tablet by mouth At Night As Needed for Sleep.   Yes Holly Guillen MD   amLODIPine (NORVASC) 10 MG tablet Take 1 tablet by mouth Daily.  2/5/24  Holly Guillen MD   aspirin 81 MG EC tablet Take 1 tablet by mouth Daily.  2/5/24  Holly Guillen MD   divalproex (DEPAKOTE ER) 500 MG 24 hr tablet Take 1 tablet by mouth 2 (Two) Times a Day for 30 days. 8/15/23 2/5/24  Ashley Najera DO   fluconazole (DIFLUCAN) 150 MG tablet Take 1 tablet by mouth Every 7 (Seven) Days. Tuesdays 2/5/24  Holly Guillen MD   fluticasone (FLONASE) 50 MCG/ACT nasal spray 2 sprays into the nostril(s) as directed by provider Daily.  2/5/24  Holly Guillen MD   guaiFENesin (MUCINEX) 600 MG 12 hr tablet Take 2 tablets by mouth 2 (Two) Times a Day As Needed.  2/5/24  Holly Guillen MD   ibuprofen (ADVIL,MOTRIN) 200 MG tablet Take 1 tablet by mouth Every 6 (Six) Hours As Needed for Mild Pain.  2/5/24  Holly Guillen MD       Allergies:     Lisinopril    Scheduled Meds:   Continuous Infusions:heparin, 17.1 Units/kg/hr, Last Rate: 17.1 Units/kg/hr (02/05/24 1616)      PRN Meds:  heparin    heparin    [COMPLETED] Insert Peripheral IV **AND** sodium chloride      OBJECTIVE    Vital Signs  Vitals:    02/05/24 1256 02/05/24 1336 02/05/24 1430 02/05/24 1530   BP: (!) 170/107 (!) 145/103 (!) 150/102 (!) 163/104   BP Location: Left arm Left arm Left arm Left arm   Patient Position: Sitting Lying Lying Lying   Pulse: 67 64 66 56   Resp: 18 12 13 13   Temp: 98.3 °F (36.8 °C)      SpO2: 99% 97% 95% 97%   Weight: 87.7 kg (193 lb 5.5 oz)      Height: 172.7 cm (68\")          Flowsheet Rows      Flowsheet Row First Filed Value   Admission Height 172.7 cm (68\") Documented at 02/05/2024 1256 "   Admission Weight 87.7 kg (193 lb 5.5 oz) Documented at 02/05/2024 1256            No intake or output data in the 24 hours ending 02/05/24 1719     Telemetry: Sinus rhythm    Physical Exam:  The patient is alert, oriented and in no distress.  Overweight  Vital signs as noted above.  Head and neck revealed no carotid bruits or jugular venous distention.  No thyromegaly or lymphadenopathy is present  Lungs clear.  No wheezing.  Breath sounds are normal bilaterally.  Heart: Normal first and second heart sounds. No murmur.  No precordial rub is present.  No gallop is present.  Abdomen: Soft and nontender.  No organomegaly is present.  Extremities : Significantly swollen left leg compared to right  Skin: Erythematous skin on the right lower extremity  Musculoskeletal system is grossly normal.  CNS grossly normal.       Results Review:  I have personally reviewed the results from the time of this admission to 2/5/2024 17:19 EST and agree with these findings:  []  Laboratory  []  Microbiology  []  Radiology  []  EKG/Telemetry   []  Cardiology/Vascular   []  Pathology  []  Old records  []  Other:    Most notable findings include:     Lab Results (last 24 hours)       Procedure Component Value Units Date/Time    COVID-19, FLU A/B, RSV PCR 1 HR TAT - Swab, Nasopharynx [[id number]] Collected: 02/05/24 1708    Specimen: Swab from Nasopharynx Updated: 02/05/24 1711    Basic Metabolic Panel [[id number]]  (Abnormal) Collected: 02/05/24 1409    Specimen: Blood Updated: 02/05/24 1511     Glucose 99 mg/dL      BUN 11 mg/dL      Creatinine 1.07 mg/dL      Sodium 141 mmol/L      Potassium 3.4 mmol/L      Comment: Slight hemolysis detected by analyzer. Result may be falsely elevated.        Chloride 102 mmol/L      CO2 28.0 mmol/L      Calcium 9.2 mg/dL      BUN/Creatinine Ratio 10.3     Anion Gap 11.0 mmol/L      eGFR 83.5 mL/min/1.73     Narrative:      GFR Normal >60  Chronic Kidney Disease <60  Kidney Failure <15      Protime-INR  [[id number]]  (Abnormal) Collected: 02/05/24 1409    Specimen: Blood Updated: 02/05/24 1431     Protime 12.1 Seconds      INR 1.12    aPTT [[id number]]  (Abnormal) Collected: 02/05/24 1409    Specimen: Blood Updated: 02/05/24 1431     PTT 25.6 seconds     CBC & Differential [[id number]]  (Abnormal) Collected: 02/05/24 1409    Specimen: Blood Updated: 02/05/24 1422    Narrative:      The following orders were created for panel order CBC & Differential.  Procedure                               Abnormality         Status                     ---------                               -----------         ------                     CBC Auto Differential[[id number]]        Abnormal            Final result                 Please view results for these tests on the individual orders.    CBC Auto Differential [[id number]]  (Abnormal) Collected: 02/05/24 1409    Specimen: Blood Updated: 02/05/24 1422     WBC 6.80 10*3/mm3      RBC 4.30 10*6/mm3      Hemoglobin 14.1 g/dL      Hematocrit 42.7 %      MCV 99.1 fL      MCH 32.7 pg      MCHC 33.0 g/dL      RDW 16.4 %      RDW-SD 56.0 fl      MPV 8.5 fL      Platelets 179 10*3/mm3      Neutrophil % 54.4 %      Lymphocyte % 32.9 %      Monocyte % 9.7 %      Eosinophil % 2.3 %      Basophil % 0.7 %      Neutrophils, Absolute 3.70 10*3/mm3      Lymphocytes, Absolute 2.20 10*3/mm3      Monocytes, Absolute 0.70 10*3/mm3      Eosinophils, Absolute 0.20 10*3/mm3      Basophils, Absolute 0.00 10*3/mm3      nRBC 0.1 /100 WBC             Imaging Results (Last 24 Hours)       Procedure Component Value Units Date/Time    CT Angio Abdominal Aorta Bilateral Iliofem Runoff [[id number]] Collected: 02/05/24 1610     Updated: 02/05/24 1636    Narrative:      CT ANGIO ABDOMINAL AORTA BILAT ILIOFEM RUNOFF    Date of Exam: 2/5/2024 3:06 PM CST    Indication: extensive dvt left leg.    Comparison: Left lower extremity venous Doppler ultrasound 2/5/2024, CT abdomen pelvis 12/27/2010    Technique: CTA of the  abdomen, pelvis and both lower extremities was performed before and after the uneventful intravenous administration of 100 cc of Isovue-370. Reconstructed coronal and sagittal images were also obtained. In addition, a 3-D volume   rendered image was created for interpretation. Automated exposure control and iterative reconstruction methods were used.      Findings:  AORTA:  Normal in caliber throughout. No dissection or penetrating ulcer identified.No significant atherosclerotic disease identified.  MESENTERIC/RENAL VESSELS:  Normal in caliber. No dissection or significant stenosis identified.  PELVIC VESSELS: Normal in caliber. No dissection or significant stenosis identified. There is no evidence of compression of the left common iliac vein by the right common iliac artery.  IVC:  Normal caliber.    RIGHT LOWER EXTREMITY:  Common femoral artery, superficial femoral artery, and popliteal artery are normal in caliber. No significant stenosis.Lower extremity arteries are of normal caliber with three-vessel runoff to the level of the mid calf where the   examination out runs the contrast bolus.    Right lower extremity venous structures are normal in caliber. No intraluminal filling defect/thrombus identified.    LEFT LOWER EXTREMITY:  Common femoral artery, superficial femoral artery, and popliteal artery are normal in caliber. No significant stenosis.Lower extremity arteries are of normal caliber with three-vessel runoff.    Left lower extremity veins are mildly distended with low-density filling defects beginning in the proximal femoral vein and extending through the calf veins, consistent with extensive left lower extremity DVT as seen on same-day ultrasound study. No   extrinsic compressive process is identified to explain patient's DVT.      LOWER THORAX: Unremarkable    LIVER: Unremarkable parenchyma without focal lesion.  BILIARY/GALLBLADDER:  Unremarkable  SPLEEN:  Unremarkable  PANCREAS:   Unremarkable  ADRENAL:  Unremarkable  KIDNEYS: Left kidney is absent. Unremarkable right renal parenchyma with no solid mass identified. No obstruction.  No calculus identified.  GASTROINTESTINAL/MESENTERY:  No evidence of obstruction nor inflammation.      RETROPERITONEUM/LYMPH NODES:  Unremarkable    REPRODUCTIVE:  Unremarkable  BLADDER:  Unremarkable    OSSEUS STRUCTURES:  Typical for age with no acute process identified.      Impression:      Impression:  1.Left lower extremity DVT from the proximal femoral vein through the calf veins. No extrinsic compressive process upon the left lower extremity venous vasculature is identified to explain findings. No evidence of May-Thurner syndrome.  2.Absent left kidney. Correlate with history.  3.Left lower extremity three-vessel runoff. Distal right calf vessels not visualized due to exam timing. Proximal right calf arteries are widely patent.      Electronically Signed: Donavan Waterman MD    2/5/2024 3:21 PM CST    Workstation ID: [id number]            LAB RESULTS (LAST 7 DAYS)    CBC  Results from last 7 days   Lab Units 02/05/24  1409   WBC 10*3/mm3 6.80   RBC 10*6/mm3 4.30   HEMOGLOBIN g/dL 14.1   HEMATOCRIT % 42.7   MCV fL 99.1*   PLATELETS 10*3/mm3 179       BMP  Results from last 7 days   Lab Units 02/05/24  1409   SODIUM mmol/L 141   POTASSIUM mmol/L 3.4*   CHLORIDE mmol/L 102   CO2 mmol/L 28.0   BUN mg/dL 11   CREATININE mg/dL 1.07   GLUCOSE mg/dL 99       CMP   Results from last 7 days   Lab Units 02/05/24  1409   SODIUM mmol/L 141   POTASSIUM mmol/L 3.4*   CHLORIDE mmol/L 102   CO2 mmol/L 28.0   BUN mg/dL 11   CREATININE mg/dL 1.07   GLUCOSE mg/dL 99       BNP        TROPONIN        CoAg  Results from last 7 days   Lab Units 02/05/24  1409   INR  1.12*   APTT seconds 25.6*       Creatinine Clearance  Estimated Creatinine Clearance: 86.9 mL/min (by C-G formula based on SCr of 1.07 mg/dL).    ABG          Radiology  CT Angio Abdominal Aorta Bilateral Iliofem  Runoff    Result Date: 2/5/2024  Impression: 1.Left lower extremity DVT from the proximal femoral vein through the calf veins. No extrinsic compressive process upon the left lower extremity venous vasculature is identified to explain findings. No evidence of May-Thurner syndrome. 2.Absent left kidney. Correlate with history. 3.Left lower extremity three-vessel runoff. Distal right calf vessels not visualized due to exam timing. Proximal right calf arteries are widely patent. Electronically Signed: Donavan Waterman MD  2/5/2024 3:21 PM CST  Workstation ID: [id number]       EKG  I personally viewed and interpreted the patient's EKG/Telemetry data:  No orders to display         Echocardiogram:    Results for orders placed during the hospital encounter of 08/14/23    Adult Transthoracic Echo Complete w/ Color, Spectral and Contrast if Necessary Per Protocol    Interpretation Summary    Left ventricular ejection fraction appears to be 56 - 60%.    Estimated right ventricular systolic pressure from tricuspid regurgitation is normal (<35 mmHg).    Indications  Syncope    Technically satisfactory study.  Mitral valve is structurally normal.  Tricuspid valve is structurally normal.  Aortic valve is structurally normal.  Pulmonic valve could not be well visualized.  No evidence for mitral tricuspid or aortic regurgitation is seen by Doppler study.  Left atrium is enlarged.  Right atrium is normal in size.  Left ventricle is normal in size and contractility with ejection fraction of 60%.  Diastolic dysfunction.  Right ventricle is normal in size.  Atrial septum is intact.  Aorta is normal.  No pericardial effusion or intracardiac thrombus is seen.    Impression  Structurally and functionally normal cardiac valves.  Left ventricular size and contractility is normal with ejection fraction of 60%.  Diastolic dysfunction.  Mild left atrial enlargement.        Stress Test:        Cardiac Catheterization:  No results found for this or  any previous visit.        Other:      ASSESSMENT & PLAN:    Principal Problem:    DVT (deep venous thrombosis)    DVT  Extensive DVT of the left leg  Doppler ultrasound and CT abdomen suggestive of proximal involvement.  Patient is unable to bear weight due to significant pain/discomfort.  I have discussed possibility of medical management only with anticoagulation versus combination of mechanical thrombectomy and anticoagulation.  Procedure was described to the patient in details and risk and benefit were discussed.  Patient would like to proceed with mechanical thrombectomy followed by initiation of oral anticoagulation.  He will be started on IV heparin in the meantime    Hypertension  Uncontrolled hypertension  Resume home medications which include amlodipine and clonidine, hydrochlorothiazide    Solitary kidney  Patient was born with a solitary kidney  Closely monitor renal function as he received contrast during CT scan.    Replace potassium currently 3.3    Hyperlipidemia  Obtain a lipid panel  Resume statin    Gout  Resume home medications: Allopurinol    Overweight  BMI is 29.4  Dietary changes and lifestyle modifications discussed with the patient.  Screening and treatment for sleep apnea also suggested  A previous echocardiogram showed preserved LV function and no significant valvular abnormalities in August 2023    Enrique To MD  02/05/24  17:19 EST

## 2024-02-05 NOTE — ED PROVIDER NOTES
Subjective   History of Present Illness  53-year-old male [facility] after he was sent for left lower extremity Doppler ultrasound.  He states been having some left leg pain for the last month.  He reports no chest pain or shortness of breath or fevers or chills or trauma.  Reports no history of DVT.  Reportedly the results showed extensive DVT in the left lower extremity.  Review of Systems    Past Medical History:   Diagnosis Date    Allergic     Hypertension        Allergies   Allergen Reactions    Lisinopril Angioedema       No past surgical history on file.    Family History   Problem Relation Age of Onset    Heart disease Mother     Cancer Mother     Diabetes Maternal Uncle        Social History     Socioeconomic History    Marital status:    Tobacco Use    Smoking status: Never    Smokeless tobacco: Never   Vaping Use    Vaping Use: Never used   Substance and Sexual Activity    Alcohol use: Yes     Comment: socially on occasion    Drug use: Never    Sexual activity: Defer     Prior to Admission medications    Medication Sig Start Date End Date Taking? Authorizing Provider   acetaminophen (TYLENOL) 325 MG tablet Take 2 tablets by mouth Every 6 (Six) Hours As Needed for Mild Pain.    Holly Guillen MD   allopurinol (ZYLOPRIM) 300 MG tablet Take 1 tablet by mouth Daily.    Holly Guillen MD   amLODIPine (NORVASC) 10 MG tablet Take 1 tablet by mouth Daily.    Holly Guillen MD   aspirin 81 MG EC tablet Take 1 tablet by mouth Daily.    Holly Guillen MD   Cholecalciferol (VITAMIN D-3 PO) Take 1 capsule by mouth Daily.    Holly Guillen MD   Cyanocobalamin (VITAMIN B 12 PO) Take 1 tablet by mouth Daily.    Holly Guillen MD   diclofenac (VOLTAREN) 75 MG EC tablet Take 1 tablet by mouth 2 (Two) Times a Day.    Holly Guillen MD   divalproex (DEPAKOTE ER) 500 MG 24 hr tablet Take 1 tablet by mouth 2 (Two) Times a Day for 30 days. 8/15/23  "9/14/23  Ashley Najera DO   fluconazole (DIFLUCAN) 150 MG tablet Take 1 tablet by mouth Every 7 (Seven) Days. Tuesdays    Holly Guillen MD   fluticasone (FLONASE) 50 MCG/ACT nasal spray 2 sprays into the nostril(s) as directed by provider Daily.    Holly Guillen MD   guaiFENesin (MUCINEX) 600 MG 12 hr tablet Take 2 tablets by mouth 2 (Two) Times a Day As Needed.    Holly Guillen MD   hydroCHLOROthiazide (HYDRODIURIL) 12.5 MG tablet Take 1 tablet by mouth Daily.    Holly Guillen MD   ibuprofen (ADVIL,MOTRIN) 200 MG tablet Take 1 tablet by mouth Every 6 (Six) Hours As Needed for Mild Pain.    Holly Guillen MD   lovastatin (MEVACOR) 20 MG tablet Take 1 tablet by mouth Every Night.    Holly Guillen MD   nadolol (CORGARD) 40 MG tablet Take 1 tablet by mouth Daily.    Holly Guillen MD   omeprazole (priLOSEC) 20 MG capsule Take 1 capsule by mouth 2 (Two) Times a Day.    Holly Guillen MD   Testosterone Cypionate 200 MG/ML solution Inject 75 mL as directed 1 (One) Time Per Week. Sundays    Holly Guillen MD   tiZANidine (ZANAFLEX) 4 MG tablet Take 1 tablet by mouth Daily.    Holly Guillen MD   zolpidem (AMBIEN) 10 MG tablet Take 1 tablet by mouth At Night As Needed for Sleep.    Holly Guillen MD     BP (!) 163/104 (BP Location: Left arm, Patient Position: Lying)   Pulse 56   Temp 98.3 °F (36.8 °C)   Resp 13   Ht 172.7 cm (68\")   Wt 87.7 kg (193 lb 5.5 oz)   SpO2 97%   BMI 29.40 kg/m²         Objective   Physical Exam  General: Well-developed well-appearing, no acute distress, alert and appropriate  Eyes:  sclera nonicteric  HEENT: Mucous membranes moist, no mucosal swelling  Neck: Supple, no nuchal rigidity  Respirations: Respirations nonlabored, equal breath sounds bilaterally, clear lungs  Heart regular rate and rhythm, no murmurs rubs or gallops,   Abdomen soft nontender nondistended, no hepatosplenomegaly, no hernia, no mass, " normal bowel sounds, no CVA tenderness  Extremities swelling left calf, some tenderness palpation left calf and thigh, normal pulses distally, normal sensorimotor function throughout the extremity  Neuro cranial nerves grossly intact, no focal limb deficits  Psych oriented, pleasant affect  Skin no rash, brisk cap refill  Procedures           ED Course      Results for orders placed or performed during the hospital encounter of 02/05/24   Basic Metabolic Panel    Specimen: Blood   Result Value Ref Range    Glucose 99 65 - 99 mg/dL    BUN 11 6 - 20 mg/dL    Creatinine 1.07 0.76 - 1.27 mg/dL    Sodium 141 136 - 145 mmol/L    Potassium 3.4 (L) 3.5 - 5.2 mmol/L    Chloride 102 98 - 107 mmol/L    CO2 28.0 22.0 - 29.0 mmol/L    Calcium 9.2 8.6 - 10.5 mg/dL    BUN/Creatinine Ratio 10.3 7.0 - 25.0    Anion Gap 11.0 5.0 - 15.0 mmol/L    eGFR 83.5 >60.0 mL/min/1.73   Protime-INR    Specimen: Blood   Result Value Ref Range    Protime 12.1 (H) 9.6 - 11.7 Seconds    INR 1.12 (H) 0.93 - 1.10   aPTT    Specimen: Blood   Result Value Ref Range    PTT 25.6 (L) 61.0 - 76.5 seconds   CBC Auto Differential    Specimen: Blood   Result Value Ref Range    WBC 6.80 3.40 - 10.80 10*3/mm3    RBC 4.30 4.14 - 5.80 10*6/mm3    Hemoglobin 14.1 13.0 - 17.7 g/dL    Hematocrit 42.7 37.5 - 51.0 %    MCV 99.1 (H) 79.0 - 97.0 fL    MCH 32.7 26.6 - 33.0 pg    MCHC 33.0 31.5 - 35.7 g/dL    RDW 16.4 (H) 12.3 - 15.4 %    RDW-SD 56.0 (H) 37.0 - 54.0 fl    MPV 8.5 6.0 - 12.0 fL    Platelets 179 140 - 450 10*3/mm3    Neutrophil % 54.4 42.7 - 76.0 %    Lymphocyte % 32.9 19.6 - 45.3 %    Monocyte % 9.7 5.0 - 12.0 %    Eosinophil % 2.3 0.3 - 6.2 %    Basophil % 0.7 0.0 - 1.5 %    Neutrophils, Absolute 3.70 1.70 - 7.00 10*3/mm3    Lymphocytes, Absolute 2.20 0.70 - 3.10 10*3/mm3    Monocytes, Absolute 0.70 0.10 - 0.90 10*3/mm3    Eosinophils, Absolute 0.20 0.00 - 0.40 10*3/mm3    Basophils, Absolute 0.00 0.00 - 0.20 10*3/mm3    nRBC 0.1 0.0 - 0.2 /100 WBC                                               Medical Decision Making  Patient main stable during the emergency room course.  Wife advised the findings and agreeable plan of admission.  He Case discussed with Dr. Gimenez interventional cardiologist on-call who does recommend initiating IV heparin and admission to hospitalist service with plan for potential intravascular intervention.  Case and findings discussed with Dr. Okeefe with the hospitalist service for admission.    Amount and/or Complexity of Data Reviewed  External Data Reviewed: radiology.     Details: I reviewed the outpatient radiology report showing widespread thrombus throughout the entire left lower extremity  Labs: ordered. Decision-making details documented in ED Course.     Details: CBC normal, Chem-7 shows some mild hypokalemia  Radiology: ordered.     Details: CT abdomen and lower extremity angiogram ordered for further assessment of the extent of the DVT per protocol.    Risk  Prescription drug management.        Final diagnoses:   Acute deep vein thrombosis (DVT) of proximal vein of left lower extremity       ED Disposition  ED Disposition       ED Disposition   Decision to Admit    Condition   --    Comment   Level of Care: Telemetry [5]   Admitting Physician: CASS OKEEFE [[id number]]   Attending Physician: CASS OKEEFE [[id number]]                 No follow-up provider specified.       Medication List      No changes were made to your prescriptions during this visit.            Romulo Colon MD  02/05/24 4777

## 2024-02-05 NOTE — Clinical Note
Manual pressure applied to vessel. Manual pressure was held by Yousuf. Manual pressure was held for 10 min. Hemostasis achieved successfully. Closure device additional comment: Lt. Popliteal

## 2024-02-05 NOTE — H&P
2/5/2024      History & Physical     Chief Complaint   Left leg pain     History of Presenting Illness     Spencer Brothers is a 53 y.o. male with past medical history of hypertension, hyperlipidemia, gout who was referred to emergency room for complaints of left leg pain and patient was found to have extensive left lower extremity DVT.  Patient denies any fever, chills, cough, chest pain, shortness of breath, nausea, vomiting or abdominal pain.  Patient denies any recent travel.  Denies any family history of clots.      Past Medical History     Spencer Brothers  has a past medical history of Allergic and Hypertension.     Past Surgical History     Spencer Brothers  has no past surgical history on file.     Family History     Spencer Brothers's family history includes Cancer in his mother; Diabetes in his maternal uncle; Heart disease in his mother.     Social History     Social History     Tobacco Use    Smoking status: Never    Smokeless tobacco: Never   Vaping Use    Vaping Use: Never used   Substance Use Topics    Alcohol use: Yes     Comment: socially on occasion    Drug use: Never        Allergies     Allergies   Allergen Reactions    Lisinopril Angioedema        Vaccination/Screening     Immunization History   Administered Date(s) Administered    COVID-19 (MODERNA) 1st,2nd,3rd Dose Monovalent 08/13/2021, 09/14/2021    COVID-19 (MODERNA) Monovalent Original Booster 05/25/2022        Current Home Medications     Prior to Admission Medications       Prescriptions Last Dose Informant Patient Reported? Taking?    acetaminophen (TYLENOL) 325 MG tablet   Yes No    Take 2 tablets by mouth Every 6 (Six) Hours As Needed for Mild Pain.    allopurinol (ZYLOPRIM) 300 MG tablet   Yes No    Take 1 tablet by mouth Daily.    amLODIPine (NORVASC) 10 MG tablet   Yes No    Take 1 tablet by mouth Daily.    aspirin 81 MG EC tablet   Yes No    Take 1 tablet by mouth Daily.    Cholecalciferol (VITAMIN D-3 PO)   Yes No    Take 1  capsule by mouth Daily.    Cyanocobalamin (VITAMIN B 12 PO)   Yes No    Take 1 tablet by mouth Daily.    diclofenac (VOLTAREN) 75 MG EC tablet   Yes No    Take 1 tablet by mouth 2 (Two) Times a Day.    divalproex (DEPAKOTE ER) 500 MG 24 hr tablet   No No    Take 1 tablet by mouth 2 (Two) Times a Day for 30 days.    fluconazole (DIFLUCAN) 150 MG tablet   Yes No    Take 1 tablet by mouth Every 7 (Seven) Days. Tuesdays    fluticasone (FLONASE) 50 MCG/ACT nasal spray   Yes No    2 sprays into the nostril(s) as directed by provider Daily.    guaiFENesin (MUCINEX) 600 MG 12 hr tablet   Yes No    Take 2 tablets by mouth 2 (Two) Times a Day As Needed.    hydroCHLOROthiazide (HYDRODIURIL) 12.5 MG tablet   Yes No    Take 1 tablet by mouth Daily.    ibuprofen (ADVIL,MOTRIN) 200 MG tablet   Yes No    Take 1 tablet by mouth Every 6 (Six) Hours As Needed for Mild Pain.    lovastatin (MEVACOR) 20 MG tablet   Yes No    Take 1 tablet by mouth Every Night.    nadolol (CORGARD) 40 MG tablet   Yes No    Take 1 tablet by mouth Daily.    omeprazole (priLOSEC) 20 MG capsule   Yes No    Take 1 capsule by mouth 2 (Two) Times a Day.    Testosterone Cypionate 200 MG/ML solution   Yes No    Inject 75 mL as directed 1 (One) Time Per Week. Sundays    tiZANidine (ZANAFLEX) 4 MG tablet   Yes No    Take 1 tablet by mouth Daily.    zolpidem (AMBIEN) 10 MG tablet   Yes No    Take 1 tablet by mouth At Night As Needed for Sleep.            Review of Systems (Must complete at least 10 of the ROS below)     General ROS: negative for - chills, fatigue, fever, malaise, night sweats, sleep disturbance, weight gain or weight loss     Psychological ROS: negative for - anxiety, depression or memory loss     Respiratory ROS: negative for - cough, orthopnea, pleuritic pain, shortness of breath,wheezing     Cardiovascular ROS: negative for - chest pain, dyspnea on exertion, edema, irregular heartbeat, loss of consciousness, murmur, orthopnea, palpitations,  "paroxysmal nocturnal dyspnea     Gastrointestinal ROS: negative for - abdominal pain, appetite loss, blood in stools, change in bowel habits,      Endocrine: No change in voice, no weight change.      Genito-Urinary ROS: negative for - nocturia, urinary frequency, dysuria, urgency     Musculoskeletal ROS: negative for  joint pain, joint stiffness, joint swelling, muscle pain     Neurological ROS: negative for  confusion, dizziness, headaches     Dermatological ROS: No rash     A 12 point review of system was addressed with the patient, pertinent positives mentioned in the history of present illness, all other reviews of systems are negative     Physical Exam (Must document in 2 areas from at least 9 systems below)     Vital Signs   Blood pressure (!) 163/104, pulse 56, temperature 98.3 °F (36.8 °C), resp. rate 13, height 172.7 cm (68\"), weight 87.7 kg (193 lb 5.5 oz), SpO2 97%.     Appearance : Resting in bed in NAD     Eyes : PERRL EOMI  Conjuctiva clear, anicteric.     EENT : Mucous membranes moist No pharyngeal exudates     Neck : Full ROM, Supple No Thyroid enlargement No JVD.     Cardiovascular : RRR without murmurs, gallops, or rubs     Respiratory : Breathing comfortably Lungs CTA No dullness to percussion     GI : Abdomen soft, nontender, ND, +BS     Extremity : Left LE + swelling, no clubbing, Cyanosis     Skin : No obvious rash Warm and dry to touch     Neurologic : AAOX3, Moving all extremities. Speech clear, tongue midline, no facial asymmetry. Sensation - Intact Cranial Nerves: II-XII intact     Pysch : Alert and Oriented x 3 Answers all my questions appropriately     Labs & Imaging     Lab Results (last 24 hours)       Procedure Component Value Units Date/Time    Basic Metabolic Panel [[id number]]  (Abnormal) Collected: 02/05/24 1409    Specimen: Blood Updated: 02/05/24 1511     Glucose 99 mg/dL      BUN 11 mg/dL      Creatinine 1.07 mg/dL      Sodium 141 mmol/L      Potassium 3.4 mmol/L      Comment: " Slight hemolysis detected by analyzer. Result may be falsely elevated.        Chloride 102 mmol/L      CO2 28.0 mmol/L      Calcium 9.2 mg/dL      BUN/Creatinine Ratio 10.3     Anion Gap 11.0 mmol/L      eGFR 83.5 mL/min/1.73     Narrative:      GFR Normal >60  Chronic Kidney Disease <60  Kidney Failure <15      Protime-INR [[id number]]  (Abnormal) Collected: 02/05/24 1409    Specimen: Blood Updated: 02/05/24 1431     Protime 12.1 Seconds      INR 1.12    aPTT [[id number]]  (Abnormal) Collected: 02/05/24 1409    Specimen: Blood Updated: 02/05/24 1431     PTT 25.6 seconds     CBC & Differential [[id number]]  (Abnormal) Collected: 02/05/24 1409    Specimen: Blood Updated: 02/05/24 1422    Narrative:      The following orders were created for panel order CBC & Differential.  Procedure                               Abnormality         Status                     ---------                               -----------         ------                     CBC Auto Differential[[id number]]        Abnormal            Final result                 Please view results for these tests on the individual orders.    CBC Auto Differential [[id number]]  (Abnormal) Collected: 02/05/24 1409    Specimen: Blood Updated: 02/05/24 1422     WBC 6.80 10*3/mm3      RBC 4.30 10*6/mm3      Hemoglobin 14.1 g/dL      Hematocrit 42.7 %      MCV 99.1 fL      MCH 32.7 pg      MCHC 33.0 g/dL      RDW 16.4 %      RDW-SD 56.0 fl      MPV 8.5 fL      Platelets 179 10*3/mm3      Neutrophil % 54.4 %      Lymphocyte % 32.9 %      Monocyte % 9.7 %      Eosinophil % 2.3 %      Basophil % 0.7 %      Neutrophils, Absolute 3.70 10*3/mm3      Lymphocytes, Absolute 2.20 10*3/mm3      Monocytes, Absolute 0.70 10*3/mm3      Eosinophils, Absolute 0.20 10*3/mm3      Basophils, Absolute 0.00 10*3/mm3      nRBC 0.1 /100 WBC              Imaging Results (Last 24 Hours)       ** No results found for the last 24 hours. **          ASSESSMENT AND PLAN:     Spencer Brothers is a 53  y.o. male with past medical history of hypertension, hyperlipidemia, gout who was referred to emergency room for complaints of left leg pain and patient was found to have extensive left lower extremity DVT.     CBC is unremarkable.  Potassium is 3.4    Patient is being admitted for    Extensive left lower extremity DVT: Patient is started on heparin drip in the emergency room.  Interventional radiology Dr. Barton was consulted for possible thrombectomy.  CT angiogram of the abdominal aorta bilateral iliofemoral runoff pending at this time.  Continue heparin drip.    Hypertension: Continue amlodipine, hydrochlorothiazide and nadolol    Gout: Continue allopurinol    Hyperlipidemia: Continue statin    GERD: Continue PPI    DVT Prophylaxis:       Code Status:   There are no questions and answers to display.          Primary Emergency Contact: Vane Houser, Home Phone: [phone]       Disposition (Where, When): TBD     Further management including, but not limited to consults, following up of test results, discharge planning, outpatient referrals will be determined and addressed by the hospitalist who will assume care of this patient.      Jay Fuentes MD     Internal Medicine, Hospitalist

## 2024-02-05 NOTE — Clinical Note
IVUS Procedure End [No, patient denies ideation or behavior] : No, patient denies ideation or behavior [No] : No

## 2024-02-06 ENCOUNTER — APPOINTMENT (OUTPATIENT)
Dept: CARDIOLOGY | Facility: HOSPITAL | Age: 54
End: 2024-02-06
Payer: MEDICAID

## 2024-02-06 VITALS
SYSTOLIC BLOOD PRESSURE: 152 MMHG | WEIGHT: 178 LBS | DIASTOLIC BLOOD PRESSURE: 98 MMHG | RESPIRATION RATE: 14 BRPM | HEART RATE: 80 BPM | HEIGHT: 68 IN | TEMPERATURE: 97.3 F | BODY MASS INDEX: 26.98 KG/M2 | OXYGEN SATURATION: 98 %

## 2024-02-06 LAB
ANION GAP SERPL CALCULATED.3IONS-SCNC: 10 MMOL/L (ref 5–15)
AORTIC DIMENSIONLESS INDEX: 0.84 (DI)
APTT PPP: 26.9 SECONDS (ref 61–76.5)
BASOPHILS # BLD AUTO: 0 10*3/MM3 (ref 0–0.2)
BASOPHILS NFR BLD AUTO: 0.5 % (ref 0–1.5)
BH CV ECHO LEFT VENTRICLE GLOBAL LONGITUDINAL STRAIN: -14.8 %
BH CV ECHO MEAS - ACS: 2.1 CM
BH CV ECHO MEAS - AO MAX PG: 4.2 MMHG
BH CV ECHO MEAS - AO MEAN PG: 2 MMHG
BH CV ECHO MEAS - AO V2 MAX: 102 CM/SEC
BH CV ECHO MEAS - AO V2 VTI: 19.2 CM
BH CV ECHO MEAS - AVA(I,D): 2.7 CM2
BH CV ECHO MEAS - EDV(CUBED): 97.3 ML
BH CV ECHO MEAS - EDV(MOD-SP4): 96.4 ML
BH CV ECHO MEAS - EF(MOD-BP): 56 %
BH CV ECHO MEAS - EF(MOD-SP4): 56.2 %
BH CV ECHO MEAS - ESV(CUBED): 42.9 ML
BH CV ECHO MEAS - ESV(MOD-SP4): 42.2 ML
BH CV ECHO MEAS - FS: 23.9 %
BH CV ECHO MEAS - IVS/LVPW: 1 CM
BH CV ECHO MEAS - IVSD: 1 CM
BH CV ECHO MEAS - LA DIMENSION: 3.8 CM
BH CV ECHO MEAS - LAT PEAK E' VEL: 7.9 CM/SEC
BH CV ECHO MEAS - LV DIASTOLIC VOL/BSA (35-75): 49.6 CM2
BH CV ECHO MEAS - LV MASS(C)D: 158.8 GRAMS
BH CV ECHO MEAS - LV MAX PG: 2.9 MMHG
BH CV ECHO MEAS - LV MEAN PG: 1 MMHG
BH CV ECHO MEAS - LV SYSTOLIC VOL/BSA (12-30): 21.7 CM2
BH CV ECHO MEAS - LV V1 MAX: 85.8 CM/SEC
BH CV ECHO MEAS - LV V1 VTI: 16.2 CM
BH CV ECHO MEAS - LVIDD: 4.6 CM
BH CV ECHO MEAS - LVIDS: 3.5 CM
BH CV ECHO MEAS - LVOT AREA: 3.1 CM2
BH CV ECHO MEAS - LVOT DIAM: 2 CM
BH CV ECHO MEAS - LVPWD: 1 CM
BH CV ECHO MEAS - MED PEAK E' VEL: 7.6 CM/SEC
BH CV ECHO MEAS - MV A MAX VEL: 82.3 CM/SEC
BH CV ECHO MEAS - MV DEC SLOPE: 342 CM/SEC2
BH CV ECHO MEAS - MV DEC TIME: 0.22 SEC
BH CV ECHO MEAS - MV E MAX VEL: 47.2 CM/SEC
BH CV ECHO MEAS - MV E/A: 0.57
BH CV ECHO MEAS - MV MAX PG: 2.8 MMHG
BH CV ECHO MEAS - MV MEAN PG: 1 MMHG
BH CV ECHO MEAS - MV P1/2T: 64.7 MSEC
BH CV ECHO MEAS - MV V2 VTI: 18.1 CM
BH CV ECHO MEAS - MVA(P1/2T): 3.4 CM2
BH CV ECHO MEAS - MVA(VTI): 2.8 CM2
BH CV ECHO MEAS - PA ACC TIME: 0.05 SEC
BH CV ECHO MEAS - PA V2 MAX: 97 CM/SEC
BH CV ECHO MEAS - PI END-D VEL: 115 CM/SEC
BH CV ECHO MEAS - RAP SYSTOLE: 3 MMHG
BH CV ECHO MEAS - RV MAX PG: 2.7 MMHG
BH CV ECHO MEAS - RV V1 MAX: 82.2 CM/SEC
BH CV ECHO MEAS - RV V1 VTI: 17 CM
BH CV ECHO MEAS - RVDD: 2.7 CM
BH CV ECHO MEAS - RVSP: 17.3 MMHG
BH CV ECHO MEAS - SI(MOD-SP4): 27.9 ML/M2
BH CV ECHO MEAS - SV(LVOT): 50.9 ML
BH CV ECHO MEAS - SV(MOD-SP4): 54.2 ML
BH CV ECHO MEAS - TAPSE (>1.6): 1.69 CM
BH CV ECHO MEAS - TR MAX PG: 14.3 MMHG
BH CV ECHO MEAS - TR MAX VEL: 189 CM/SEC
BH CV ECHO MEASUREMENTS AVERAGE E/E' RATIO: 6.09
BH CV XLRA - TDI S': 12 CM/SEC
BUN SERPL-MCNC: 10 MG/DL (ref 6–20)
BUN/CREAT SERPL: 10.5 (ref 7–25)
CALCIUM SPEC-SCNC: 8.7 MG/DL (ref 8.6–10.5)
CHLORIDE SERPL-SCNC: 103 MMOL/L (ref 98–107)
CHOLEST SERPL-MCNC: 174 MG/DL (ref 0–200)
CO2 SERPL-SCNC: 28 MMOL/L (ref 22–29)
CREAT SERPL-MCNC: 0.95 MG/DL (ref 0.76–1.27)
DEPRECATED RDW RBC AUTO: 60.4 FL (ref 37–54)
EGFRCR SERPLBLD CKD-EPI 2021: 96.3 ML/MIN/1.73
EOSINOPHIL # BLD AUTO: 0.1 10*3/MM3 (ref 0–0.4)
EOSINOPHIL NFR BLD AUTO: 2.1 % (ref 0.3–6.2)
ERYTHROCYTE [DISTWIDTH] IN BLOOD BY AUTOMATED COUNT: 16.9 % (ref 12.3–15.4)
GLUCOSE SERPL-MCNC: 107 MG/DL (ref 65–99)
HCT VFR BLD AUTO: 40.9 % (ref 37.5–51)
HDLC SERPL-MCNC: 24 MG/DL (ref 40–60)
HGB BLD-MCNC: 13.7 G/DL (ref 13–17.7)
LDLC SERPL CALC-MCNC: 53 MG/DL (ref 0–100)
LDLC/HDLC SERPL: 0.74 {RATIO}
LEFT ATRIUM VOLUME INDEX: 20.2 ML/M2
LYMPHOCYTES # BLD AUTO: 1.9 10*3/MM3 (ref 0.7–3.1)
LYMPHOCYTES NFR BLD AUTO: 31.5 % (ref 19.6–45.3)
MAGNESIUM SERPL-MCNC: 1.5 MG/DL (ref 1.6–2.6)
MAGNESIUM SERPL-MCNC: 2.5 MG/DL (ref 1.6–2.6)
MCH RBC QN AUTO: 32.9 PG (ref 26.6–33)
MCHC RBC AUTO-ENTMCNC: 33.6 G/DL (ref 31.5–35.7)
MCV RBC AUTO: 97.8 FL (ref 79–97)
MONOCYTES # BLD AUTO: 0.6 10*3/MM3 (ref 0.1–0.9)
MONOCYTES NFR BLD AUTO: 9.8 % (ref 5–12)
NEUTROPHILS NFR BLD AUTO: 3.4 10*3/MM3 (ref 1.7–7)
NEUTROPHILS NFR BLD AUTO: 56.1 % (ref 42.7–76)
NRBC BLD AUTO-RTO: 0.2 /100 WBC (ref 0–0.2)
PLATELET # BLD AUTO: 145 10*3/MM3 (ref 140–450)
PMV BLD AUTO: 8 FL (ref 6–12)
POTASSIUM SERPL-SCNC: 3.9 MMOL/L (ref 3.5–5.2)
POTASSIUM SERPL-SCNC: 3.9 MMOL/L (ref 3.5–5.2)
RBC # BLD AUTO: 4.18 10*6/MM3 (ref 4.14–5.8)
SINUS: 3.7 CM
SODIUM SERPL-SCNC: 141 MMOL/L (ref 136–145)
TRIGL SERPL-MCNC: 661 MG/DL (ref 0–150)
VLDLC SERPL-MCNC: 97 MG/DL (ref 5–40)
WBC NRBC COR # BLD AUTO: 6.1 10*3/MM3 (ref 3.4–10.8)

## 2024-02-06 PROCEDURE — 93306 TTE W/DOPPLER COMPLETE: CPT

## 2024-02-06 PROCEDURE — 84132 ASSAY OF SERUM POTASSIUM: CPT | Performed by: HOSPITALIST

## 2024-02-06 PROCEDURE — 80061 LIPID PANEL: CPT | Performed by: INTERNAL MEDICINE

## 2024-02-06 PROCEDURE — 99232 SBSQ HOSP IP/OBS MODERATE 35: CPT | Performed by: INTERNAL MEDICINE

## 2024-02-06 PROCEDURE — 36415 COLL VENOUS BLD VENIPUNCTURE: CPT | Performed by: INTERNAL MEDICINE

## 2024-02-06 PROCEDURE — 85730 THROMBOPLASTIN TIME PARTIAL: CPT | Performed by: INTERNAL MEDICINE

## 2024-02-06 PROCEDURE — 83735 ASSAY OF MAGNESIUM: CPT | Performed by: HOSPITALIST

## 2024-02-06 PROCEDURE — 93356 MYOCRD STRAIN IMG SPCKL TRCK: CPT | Performed by: INTERNAL MEDICINE

## 2024-02-06 PROCEDURE — 80048 BASIC METABOLIC PNL TOTAL CA: CPT | Performed by: HOSPITALIST

## 2024-02-06 PROCEDURE — B24BZZ4 ULTRASONOGRAPHY OF HEART WITH AORTA, TRANSESOPHAGEAL: ICD-10-PCS | Performed by: INTERNAL MEDICINE

## 2024-02-06 PROCEDURE — 85025 COMPLETE CBC W/AUTO DIFF WBC: CPT | Performed by: HOSPITALIST

## 2024-02-06 PROCEDURE — 93306 TTE W/DOPPLER COMPLETE: CPT | Performed by: INTERNAL MEDICINE

## 2024-02-06 PROCEDURE — 93356 MYOCRD STRAIN IMG SPCKL TRCK: CPT

## 2024-02-06 PROCEDURE — 25010000002 MAGNESIUM SULFATE 2 GM/50ML SOLUTION: Performed by: STUDENT IN AN ORGANIZED HEALTH CARE EDUCATION/TRAINING PROGRAM

## 2024-02-06 PROCEDURE — 83735 ASSAY OF MAGNESIUM: CPT | Performed by: STUDENT IN AN ORGANIZED HEALTH CARE EDUCATION/TRAINING PROGRAM

## 2024-02-06 RX ORDER — NALOXONE HYDROCHLORIDE 4 MG/.1ML
SPRAY NASAL
Qty: 2 EACH | Refills: 0 | Status: SHIPPED | OUTPATIENT
Start: 2024-02-06 | End: 2024-02-19

## 2024-02-06 RX ORDER — CLONIDINE HYDROCHLORIDE 0.1 MG/1
0.2 TABLET ORAL 2 TIMES DAILY
Status: DISCONTINUED | OUTPATIENT
Start: 2024-02-06 | End: 2024-02-06 | Stop reason: HOSPADM

## 2024-02-06 RX ORDER — HYDROCODONE BITARTRATE AND ACETAMINOPHEN 5; 325 MG/1; MG/1
1 TABLET ORAL EVERY 8 HOURS PRN
Qty: 9 TABLET | Refills: 0 | Status: SHIPPED | OUTPATIENT
Start: 2024-02-06 | End: 2024-02-06 | Stop reason: SDUPTHER

## 2024-02-06 RX ORDER — DIVALPROEX SODIUM 500 MG/1
500 TABLET, EXTENDED RELEASE ORAL 2 TIMES DAILY
Status: DISCONTINUED | OUTPATIENT
Start: 2024-02-06 | End: 2024-02-06 | Stop reason: HOSPADM

## 2024-02-06 RX ORDER — HYDROCHLOROTHIAZIDE 25 MG/1
25 TABLET ORAL DAILY
Status: DISCONTINUED | OUTPATIENT
Start: 2024-02-07 | End: 2024-02-06 | Stop reason: HOSPADM

## 2024-02-06 RX ORDER — HYDROCODONE BITARTRATE AND ACETAMINOPHEN 5; 325 MG/1; MG/1
1 TABLET ORAL EVERY 8 HOURS PRN
Qty: 9 TABLET | Refills: 0 | Status: SHIPPED | OUTPATIENT
Start: 2024-02-06 | End: 2024-02-09

## 2024-02-06 RX ORDER — HYDRALAZINE HYDROCHLORIDE 20 MG/ML
10 INJECTION INTRAMUSCULAR; INTRAVENOUS EVERY 6 HOURS PRN
Status: DISCONTINUED | OUTPATIENT
Start: 2024-02-06 | End: 2024-02-06 | Stop reason: HOSPADM

## 2024-02-06 RX ORDER — MAGNESIUM SULFATE HEPTAHYDRATE 40 MG/ML
2 INJECTION, SOLUTION INTRAVENOUS
Status: DISCONTINUED | OUTPATIENT
Start: 2024-02-06 | End: 2024-02-06 | Stop reason: HOSPADM

## 2024-02-06 RX ADMIN — APIXABAN 10 MG: 5 TABLET, FILM COATED ORAL at 08:19

## 2024-02-06 RX ADMIN — HYDROCHLOROTHIAZIDE 12.5 MG: 12.5 TABLET ORAL at 07:26

## 2024-02-06 RX ADMIN — DIVALPROEX SODIUM 500 MG: 500 TABLET, EXTENDED RELEASE ORAL at 10:46

## 2024-02-06 RX ADMIN — MAGNESIUM SULFATE HEPTAHYDRATE 2 G: 40 INJECTION, SOLUTION INTRAVENOUS at 08:19

## 2024-02-06 RX ADMIN — Medication 10 ML: at 07:28

## 2024-02-06 RX ADMIN — ZOLPIDEM TARTRATE 5 MG: 5 TABLET ORAL at 00:15

## 2024-02-06 RX ADMIN — HYDROCODONE BITARTRATE AND ACETAMINOPHEN 1 TABLET: 5; 325 TABLET ORAL at 09:22

## 2024-02-06 RX ADMIN — ALLOPURINOL 300 MG: 300 TABLET ORAL at 07:26

## 2024-02-06 RX ADMIN — NADOLOL 40 MG: 20 TABLET ORAL at 07:26

## 2024-02-06 RX ADMIN — ATORVASTATIN CALCIUM 10 MG: 10 TABLET, FILM COATED ORAL at 07:27

## 2024-02-06 RX ADMIN — POTASSIUM CHLORIDE 40 MEQ: 1500 TABLET, EXTENDED RELEASE ORAL at 00:15

## 2024-02-06 RX ADMIN — AMLODIPINE BESYLATE 10 MG: 5 TABLET ORAL at 07:26

## 2024-02-06 RX ADMIN — HYDROCODONE BITARTRATE AND ACETAMINOPHEN 1 TABLET: 5; 325 TABLET ORAL at 03:14

## 2024-02-06 RX ADMIN — MAGNESIUM SULFATE HEPTAHYDRATE 2 G: 40 INJECTION, SOLUTION INTRAVENOUS at 09:24

## 2024-02-06 RX ADMIN — CLONIDINE HYDROCHLORIDE 0.2 MG: 0.1 TABLET ORAL at 10:46

## 2024-02-06 RX ADMIN — PANTOPRAZOLE SODIUM 40 MG: 40 TABLET, DELAYED RELEASE ORAL at 05:26

## 2024-02-06 NOTE — NURSING NOTE
Noted BP hypertensive at 143/113. PO BP meds given as ordered. MD secure chatted and notified. Magnesium 1.5. Nurse protocol replacement.

## 2024-02-06 NOTE — PLAN OF CARE
Patient is up ad terry in room, voiced no needs for discharge. Has a.d. at home if needed. Will complete order.

## 2024-02-06 NOTE — PAYOR COMM NOTE
"Spencer Funes (53 y.o. Male)       Date of Birth   1970    Social Security Number       Address   [address] [place] [address]    Home Phone   [phone]    MRN   [id number]       [employer]   None    Marital Status                               Admission Date   2/5/24    Admission Type   Emergency    Admitting Provider   Jay Fuentes MD    Attending Provider   Sophia Cheng MD    Department, Room/Bed   [facility] 2D, 256/1       Discharge Date       Discharge Disposition       Discharge Destination                                 Attending Provider: Sophia Cheng MD    Allergies: Lisinopril    Isolation: None   Infection: None   Code Status: CPR    Ht: 172.7 cm (68\")   Wt: 81 kg (178 lb 9.2 oz)    Admission Cmt: None   Principal Problem: DVT (deep venous thrombosis) [I82.409]                   Active Insurance as of 2/5/2024       Primary Coverage       Payor Plan Insurance Group Employer/Plan Group    ANTH MEDICAID [facility] -ANTH [id number]       Payor Plan Address Payor Plan Phone Number Payor Plan Fax Number Effective Dates    MAIL STOP:   8/1/2023 - None Entered    [address] [address]       [facility] [address]         Subscriber Name Subscriber Birth Date Member ID       SPENCER FUNES 1970 [id number]                     Emergency Contacts        (Rel.) Home Phone Work Phone Mobile Phone    Vane Houser (Spouse) [phone] -- [phone]                 History & Physical        Jay Fuentes MD at 02/05/24 1558          2/5/2024      History & Physical     Chief Complaint   Left leg pain     History of Presenting Illness     Spencer Funes is a 53 y.o. male with past medical history of hypertension, hyperlipidemia, gout who was referred to emergency room for complaints of left leg pain and patient was found to have extensive left lower extremity DVT.  Patient denies any fever, chills, cough, chest pain, " shortness of breath, nausea, vomiting or abdominal pain.  Patient denies any recent travel.  Denies any family history of clots.      Past Medical History     Spencer Brothers  has a past medical history of Allergic and Hypertension.     Past Surgical History     Spencer Brothers  has no past surgical history on file.     Family History     Spencer Brothers's family history includes Cancer in his mother; Diabetes in his maternal uncle; Heart disease in his mother.     Social History     Social History     Tobacco Use    Smoking status: Never    Smokeless tobacco: Never   Vaping Use    Vaping Use: Never used   Substance Use Topics    Alcohol use: Yes     Comment: socially on occasion    Drug use: Never        Allergies     Allergies   Allergen Reactions    Lisinopril Angioedema        Vaccination/Screening     Immunization History   Administered Date(s) Administered    COVID-19 (MODERNA) 1st,2nd,3rd Dose Monovalent 08/13/2021, 09/14/2021    COVID-19 (MODERNA) Monovalent Original Booster 05/25/2022        Current Home Medications     Prior to Admission Medications       Prescriptions Last Dose Informant Patient Reported? Taking?    acetaminophen (TYLENOL) 325 MG tablet   Yes No    Take 2 tablets by mouth Every 6 (Six) Hours As Needed for Mild Pain.    allopurinol (ZYLOPRIM) 300 MG tablet   Yes No    Take 1 tablet by mouth Daily.    amLODIPine (NORVASC) 10 MG tablet   Yes No    Take 1 tablet by mouth Daily.    aspirin 81 MG EC tablet   Yes No    Take 1 tablet by mouth Daily.    Cholecalciferol (VITAMIN D-3 PO)   Yes No    Take 1 capsule by mouth Daily.    Cyanocobalamin (VITAMIN B 12 PO)   Yes No    Take 1 tablet by mouth Daily.    diclofenac (VOLTAREN) 75 MG EC tablet   Yes No    Take 1 tablet by mouth 2 (Two) Times a Day.    divalproex (DEPAKOTE ER) 500 MG 24 hr tablet   No No    Take 1 tablet by mouth 2 (Two) Times a Day for 30 days.    fluconazole (DIFLUCAN) 150 MG tablet   Yes No    Take 1 tablet by mouth Every 7  (Seven) Days. Tuesdays    fluticasone (FLONASE) 50 MCG/ACT nasal spray   Yes No    2 sprays into the nostril(s) as directed by provider Daily.    guaiFENesin (MUCINEX) 600 MG 12 hr tablet   Yes No    Take 2 tablets by mouth 2 (Two) Times a Day As Needed.    hydroCHLOROthiazide (HYDRODIURIL) 12.5 MG tablet   Yes No    Take 1 tablet by mouth Daily.    ibuprofen (ADVIL,MOTRIN) 200 MG tablet   Yes No    Take 1 tablet by mouth Every 6 (Six) Hours As Needed for Mild Pain.    lovastatin (MEVACOR) 20 MG tablet   Yes No    Take 1 tablet by mouth Every Night.    nadolol (CORGARD) 40 MG tablet   Yes No    Take 1 tablet by mouth Daily.    omeprazole (priLOSEC) 20 MG capsule   Yes No    Take 1 capsule by mouth 2 (Two) Times a Day.    Testosterone Cypionate 200 MG/ML solution   Yes No    Inject 75 mL as directed 1 (One) Time Per Week. Sundays    tiZANidine (ZANAFLEX) 4 MG tablet   Yes No    Take 1 tablet by mouth Daily.    zolpidem (AMBIEN) 10 MG tablet   Yes No    Take 1 tablet by mouth At Night As Needed for Sleep.            Review of Systems (Must complete at least 10 of the ROS below)     General ROS: negative for - chills, fatigue, fever, malaise, night sweats, sleep disturbance, weight gain or weight loss     Psychological ROS: negative for - anxiety, depression or memory loss     Respiratory ROS: negative for - cough, orthopnea, pleuritic pain, shortness of breath,wheezing     Cardiovascular ROS: negative for - chest pain, dyspnea on exertion, edema, irregular heartbeat, loss of consciousness, murmur, orthopnea, palpitations, paroxysmal nocturnal dyspnea     Gastrointestinal ROS: negative for - abdominal pain, appetite loss, blood in stools, change in bowel habits,      Endocrine: No change in voice, no weight change.      Genito-Urinary ROS: negative for - nocturia, urinary frequency, dysuria, urgency     Musculoskeletal ROS: negative for  joint pain, joint stiffness, joint swelling, muscle pain     Neurological ROS:  "negative for  confusion, dizziness, headaches     Dermatological ROS: No rash     A 12 point review of system was addressed with the patient, pertinent positives mentioned in the history of present illness, all other reviews of systems are negative     Physical Exam (Must document in 2 areas from at least 9 systems below)     Vital Signs   Blood pressure (!) 163/104, pulse 56, temperature 98.3 °F (36.8 °C), resp. rate 13, height 172.7 cm (68\"), weight 87.7 kg (193 lb 5.5 oz), SpO2 97%.     Appearance : Resting in bed in NAD     Eyes : PERRL EOMI  Conjuctiva clear, anicteric.     EENT : Mucous membranes moist No pharyngeal exudates     Neck : Full ROM, Supple No Thyroid enlargement No JVD.     Cardiovascular : RRR without murmurs, gallops, or rubs     Respiratory : Breathing comfortably Lungs CTA No dullness to percussion     GI : Abdomen soft, nontender, ND, +BS     Extremity : Left LE + swelling, no clubbing, Cyanosis     Skin : No obvious rash Warm and dry to touch     Neurologic : AAOX3, Moving all extremities. Speech clear, tongue midline, no facial asymmetry. Sensation - Intact Cranial Nerves: II-XII intact     Pysch : Alert and Oriented x 3 Answers all my questions appropriately     Labs & Imaging     Lab Results (last 24 hours)       Procedure Component Value Units Date/Time    Basic Metabolic Panel [[id number]]  (Abnormal) Collected: 02/05/24 1409    Specimen: Blood Updated: 02/05/24 1511     Glucose 99 mg/dL      BUN 11 mg/dL      Creatinine 1.07 mg/dL      Sodium 141 mmol/L      Potassium 3.4 mmol/L      Comment: Slight hemolysis detected by analyzer. Result may be falsely elevated.        Chloride 102 mmol/L      CO2 28.0 mmol/L      Calcium 9.2 mg/dL      BUN/Creatinine Ratio 10.3     Anion Gap 11.0 mmol/L      eGFR 83.5 mL/min/1.73     Narrative:      GFR Normal >60  Chronic Kidney Disease <60  Kidney Failure <15      Protime-INR [[id number]]  (Abnormal) Collected: 02/05/24 1409    Specimen: Blood " Updated: 02/05/24 1431     Protime 12.1 Seconds      INR 1.12    aPTT [[id number]]  (Abnormal) Collected: 02/05/24 1409    Specimen: Blood Updated: 02/05/24 1431     PTT 25.6 seconds     CBC & Differential [[id number]]  (Abnormal) Collected: 02/05/24 1409    Specimen: Blood Updated: 02/05/24 1422    Narrative:      The following orders were created for panel order CBC & Differential.  Procedure                               Abnormality         Status                     ---------                               -----------         ------                     CBC Auto Differential[[id number]]        Abnormal            Final result                 Please view results for these tests on the individual orders.    CBC Auto Differential [[id number]]  (Abnormal) Collected: 02/05/24 1409    Specimen: Blood Updated: 02/05/24 1422     WBC 6.80 10*3/mm3      RBC 4.30 10*6/mm3      Hemoglobin 14.1 g/dL      Hematocrit 42.7 %      MCV 99.1 fL      MCH 32.7 pg      MCHC 33.0 g/dL      RDW 16.4 %      RDW-SD 56.0 fl      MPV 8.5 fL      Platelets 179 10*3/mm3      Neutrophil % 54.4 %      Lymphocyte % 32.9 %      Monocyte % 9.7 %      Eosinophil % 2.3 %      Basophil % 0.7 %      Neutrophils, Absolute 3.70 10*3/mm3      Lymphocytes, Absolute 2.20 10*3/mm3      Monocytes, Absolute 0.70 10*3/mm3      Eosinophils, Absolute 0.20 10*3/mm3      Basophils, Absolute 0.00 10*3/mm3      nRBC 0.1 /100 WBC              Imaging Results (Last 24 Hours)       ** No results found for the last 24 hours. **          ASSESSMENT AND PLAN:     Spencer Brothers is a 53 y.o. male with past medical history of hypertension, hyperlipidemia, gout who was referred to emergency room for complaints of left leg pain and patient was found to have extensive left lower extremity DVT.     CBC is unremarkable.  Potassium is 3.4    Patient is being admitted for    Extensive left lower extremity DVT: Patient is started on heparin drip in the emergency room.  Interventional  radiology Dr. Barton was consulted for possible thrombectomy.  CT angiogram of the abdominal aorta bilateral iliofemoral runoff pending at this time.  Continue heparin drip.    Hypertension: Continue amlodipine, hydrochlorothiazide and nadolol    Gout: Continue allopurinol    Hyperlipidemia: Continue statin    GERD: Continue PPI    DVT Prophylaxis:       Code Status:   There are no questions and answers to display.          Primary Emergency Contact: Vane Houser Home Phone: [phone]       Disposition (Where, When): TBD     Further management including, but not limited to consults, following up of test results, discharge planning, outpatient referrals will be determined and addressed by the hospitalist who will assume care of this patient.      Jay Fuentes MD     Internal Medicine, Hospitalist    Electronically signed by Jay Fuentes MD at 02/05/24 3380          Emergency Department Notes        Romulo Colon MD at 02/05/24 1553          Subjective   History of Present Illness  53-year-old male presents from [facility] after he was sent for left lower extremity Doppler ultrasound.  He states been having some left leg pain for the last month.  He reports no chest pain or shortness of breath or fevers or chills or trauma.  Reports no history of DVT.  Reportedly the results showed extensive DVT in the left lower extremity.  Review of Systems    Past Medical History:   Diagnosis Date    Allergic     Hypertension        Allergies   Allergen Reactions    Lisinopril Angioedema       No past surgical history on file.    Family History   Problem Relation Age of Onset    Heart disease Mother     Cancer Mother     Diabetes Maternal Uncle        Social History     Socioeconomic History    Marital status:    Tobacco Use    Smoking status: Never    Smokeless tobacco: Never   Vaping Use    Vaping Use: Never used   Substance and Sexual Activity    Alcohol use: Yes     Comment: socially on occasion    Drug  use: Never    Sexual activity: Defer     Prior to Admission medications    Medication Sig Start Date End Date Taking? Authorizing Provider   acetaminophen (TYLENOL) 325 MG tablet Take 2 tablets by mouth Every 6 (Six) Hours As Needed for Mild Pain.    Holly Guillen MD   allopurinol (ZYLOPRIM) 300 MG tablet Take 1 tablet by mouth Daily.    Holly Guillen MD   amLODIPine (NORVASC) 10 MG tablet Take 1 tablet by mouth Daily.    Hloly Guillen MD   aspirin 81 MG EC tablet Take 1 tablet by mouth Daily.    Holly Guillen MD   Cholecalciferol (VITAMIN D-3 PO) Take 1 capsule by mouth Daily.    Holly Guillen MD   Cyanocobalamin (VITAMIN B 12 PO) Take 1 tablet by mouth Daily.    Holly Guillen MD   diclofenac (VOLTAREN) 75 MG EC tablet Take 1 tablet by mouth 2 (Two) Times a Day.    Holly Guillen MD   divalproex (DEPAKOTE ER) 500 MG 24 hr tablet Take 1 tablet by mouth 2 (Two) Times a Day for 30 days. 8/15/23 9/14/23  Ashley Najera DO   fluconazole (DIFLUCAN) 150 MG tablet Take 1 tablet by mouth Every 7 (Seven) Days. Tuesdays    Holly Guillen MD   fluticasone (FLONASE) 50 MCG/ACT nasal spray 2 sprays into the nostril(s) as directed by provider Daily.    Holly Guillen MD   guaiFENesin (MUCINEX) 600 MG 12 hr tablet Take 2 tablets by mouth 2 (Two) Times a Day As Needed.    Holly Guillen MD   hydroCHLOROthiazide (HYDRODIURIL) 12.5 MG tablet Take 1 tablet by mouth Daily.    Holly Guillen MD   ibuprofen (ADVIL,MOTRIN) 200 MG tablet Take 1 tablet by mouth Every 6 (Six) Hours As Needed for Mild Pain.    Holly Guillen MD   lovastatin (MEVACOR) 20 MG tablet Take 1 tablet by mouth Every Night.    Holly Guillen MD   nadolol (CORGARD) 40 MG tablet Take 1 tablet by mouth Daily.    Holly Guillen MD   omeprazole (priLOSEC) 20 MG capsule Take 1 capsule by mouth 2 (Two) Times a Day.    Holly Guillen MD   Testosterone Cypionate 200  "MG/ML solution Inject 75 mL as directed 1 (One) Time Per Week. Sundays    ProviderHolly MD   tiZANidine (ZANAFLEX) 4 MG tablet Take 1 tablet by mouth Daily.    Holly Guillen MD   zolpidem (AMBIEN) 10 MG tablet Take 1 tablet by mouth At Night As Needed for Sleep.    Holly Guillen MD     BP (!) 163/104 (BP Location: Left arm, Patient Position: Lying)   Pulse 56   Temp 98.3 °F (36.8 °C)   Resp 13   Ht 172.7 cm (68\")   Wt 87.7 kg (193 lb 5.5 oz)   SpO2 97%   BMI 29.40 kg/m²         Objective   Physical Exam  General: Well-developed well-appearing, no acute distress, alert and appropriate  Eyes:  sclera nonicteric  HEENT: Mucous membranes moist, no mucosal swelling  Neck: Supple, no nuchal rigidity  Respirations: Respirations nonlabored, equal breath sounds bilaterally, clear lungs  Heart regular rate and rhythm, no murmurs rubs or gallops,   Abdomen soft nontender nondistended, no hepatosplenomegaly, no hernia, no mass, normal bowel sounds, no CVA tenderness  Extremities swelling left calf, some tenderness palpation left calf and thigh, normal pulses distally, normal sensorimotor function throughout the extremity  Neuro cranial nerves grossly intact, no focal limb deficits  Psych oriented, pleasant affect  Skin no rash, brisk cap refill  Procedures          ED Course      Results for orders placed or performed during the hospital encounter of 02/05/24   Basic Metabolic Panel    Specimen: Blood   Result Value Ref Range    Glucose 99 65 - 99 mg/dL    BUN 11 6 - 20 mg/dL    Creatinine 1.07 0.76 - 1.27 mg/dL    Sodium 141 136 - 145 mmol/L    Potassium 3.4 (L) 3.5 - 5.2 mmol/L    Chloride 102 98 - 107 mmol/L    CO2 28.0 22.0 - 29.0 mmol/L    Calcium 9.2 8.6 - 10.5 mg/dL    BUN/Creatinine Ratio 10.3 7.0 - 25.0    Anion Gap 11.0 5.0 - 15.0 mmol/L    eGFR 83.5 >60.0 mL/min/1.73   Protime-INR    Specimen: Blood   Result Value Ref Range    Protime 12.1 (H) 9.6 - 11.7 Seconds    INR 1.12 (H) 0.93 - " 1.10   aPTT    Specimen: Blood   Result Value Ref Range    PTT 25.6 (L) 61.0 - 76.5 seconds   CBC Auto Differential    Specimen: Blood   Result Value Ref Range    WBC 6.80 3.40 - 10.80 10*3/mm3    RBC 4.30 4.14 - 5.80 10*6/mm3    Hemoglobin 14.1 13.0 - 17.7 g/dL    Hematocrit 42.7 37.5 - 51.0 %    MCV 99.1 (H) 79.0 - 97.0 fL    MCH 32.7 26.6 - 33.0 pg    MCHC 33.0 31.5 - 35.7 g/dL    RDW 16.4 (H) 12.3 - 15.4 %    RDW-SD 56.0 (H) 37.0 - 54.0 fl    MPV 8.5 6.0 - 12.0 fL    Platelets 179 140 - 450 10*3/mm3    Neutrophil % 54.4 42.7 - 76.0 %    Lymphocyte % 32.9 19.6 - 45.3 %    Monocyte % 9.7 5.0 - 12.0 %    Eosinophil % 2.3 0.3 - 6.2 %    Basophil % 0.7 0.0 - 1.5 %    Neutrophils, Absolute 3.70 1.70 - 7.00 10*3/mm3    Lymphocytes, Absolute 2.20 0.70 - 3.10 10*3/mm3    Monocytes, Absolute 0.70 0.10 - 0.90 10*3/mm3    Eosinophils, Absolute 0.20 0.00 - 0.40 10*3/mm3    Basophils, Absolute 0.00 0.00 - 0.20 10*3/mm3    nRBC 0.1 0.0 - 0.2 /100 WBC                                              Medical Decision Making  Patient main stable during the emergency room course.  Wife advised the findings and agreeable plan of admission.  He Case discussed with Dr. Gimenez interventional cardiologist on-call who does recommend initiating IV heparin and admission to hospitalist service with plan for potential intravascular intervention.  Case and findings discussed with Dr. Fuentes with the hospitalist service for admission.    Amount and/or Complexity of Data Reviewed  External Data Reviewed: radiology.     Details: I reviewed the outpatient radiology report showing widespread thrombus throughout the entire left lower extremity  Labs: ordered. Decision-making details documented in ED Course.     Details: CBC normal, Chem-7 shows some mild hypokalemia  Radiology: ordered.     Details: CT abdomen and lower extremity angiogram ordered for further assessment of the extent of the DVT per protocol.    Risk  Prescription drug  management.        Final diagnoses:   Acute deep vein thrombosis (DVT) of proximal vein of left lower extremity       ED Disposition  ED Disposition       ED Disposition   Decision to Admit    Condition   --    Comment   Level of Care: Telemetry [5]   Admitting Physician: CASS OKEEFE [[id number]]   Attending Physician: CASS OKEEFE [[id number]]                 No follow-up provider specified.       Medication List      No changes were made to your prescriptions during this visit.            Romulo Colon MD  02/05/24 1556      Electronically signed by Romulo Colon MD at 02/05/24 1556       Trini Hernandez, RN at 02/05/24 1552          Pt in CT; wife updated on plan of care    Electronically signed by Trini Hernandez, RN at 02/05/24 1552       Trini Hernandez, RN at 02/05/24 1500          Pt provided urinal      Electronically signed by Trini Hernandez, RN at 02/05/24 1553       Vital Signs (last day)       Date/Time Temp Temp src Pulse Resp BP Patient Position SpO2    02/06/24 0815 -- -- 76 -- 131/95 Sitting --    02/06/24 0718 97.3 (36.3) Oral 68 11 148/104 Sitting 97    02/06/24 0327 97.9 (36.6) Oral 68 -- 115/81 Lying 94    02/05/24 2356 97.7 (36.5) Oral 67 15 -- Lying 97    02/05/24 2300 -- -- -- -- 137/90 -- --    02/05/24 2100 -- -- 60 -- 176/115 -- 100    02/05/24 18:55:28 -- -- 74 -- 143/104 -- 95    02/05/24 18:49:56 -- -- 103 -- 137/94 -- 100    02/05/24 18:30:35 -- -- 68 -- 172/114 -- 100    02/05/24 18:20:01 -- -- 69 -- 175/116 -- 100    02/05/24 18:13:49 -- -- 68 -- 175/116 -- 100    02/05/24 1803 -- -- 67 -- 187/121 -- 100    02/05/24 1530 -- -- 56 13 163/104 Lying 97    02/05/24 1430 -- -- 66 13 150/102 Lying 95    02/05/24 13:36:02 -- -- 64 12 145/103 Lying 97    02/05/24 1256 98.3 (36.8) -- 67 18 170/107 Sitting 99          Operative/Procedure Notes (last 24 hours)  Notes from 02/05/24 1018 through 02/06/24 1018   No notes of this type exist for this encounter.          Physician Progress Notes (last 24  hours)        Enrique To MD at 02/06/24 0792          Referring Provider: Sophia Cheng MD    Reason for follow-up: DVT     Patient Care Team:  Kaleigh Strickland APRN as PCP - General (Nurse Practitioner)      SUBJECTIVE  Complains of some postprocedure discomfort at the access site otherwise able to ambulate without difficulty.  Overall feels better.     ROS  Review of all systems negative except as indicated.    Since I have last seen, the patient has been without any chest discomfort, shortness of breath, palpitations, dizziness or syncope.  Denies having any headache, abdominal pain, nausea, vomiting, diarrhea, constipation, loss of weight or loss of appetite.  Denies having any excessive bruising, hematuria or blood in the stool.  ROS      Personal History:    Past Medical History:   Diagnosis Date    Allergic     Arthritis     GERD (gastroesophageal reflux disease)     Hyperlipidemia     Hypertension        Past Surgical History:   Procedure Laterality Date    COLONOSCOPY         Family History   Problem Relation Age of Onset    Heart disease Mother     Cancer Mother     Diabetes Maternal Uncle        Social History     Tobacco Use    Smoking status: Never    Smokeless tobacco: Never   Vaping Use    Vaping Use: Never used   Substance Use Topics    Alcohol use: Yes     Comment: socially on occasion    Drug use: Never        Home meds:  Prior to Admission medications    Medication Sig Start Date End Date Taking? Authorizing Provider   acetaminophen (TYLENOL) 325 MG tablet Take 2 tablets by mouth Every 6 (Six) Hours As Needed for Mild Pain.   Yes Holly Guillen MD   allopurinol (ZYLOPRIM) 300 MG tablet Take 1 tablet by mouth Daily.   Yes Holly Guillen MD   celecoxib (CeleBREX) 200 MG capsule Take 1 capsule by mouth Daily.   Yes Holly Guillen MD   Cholecalciferol (VITAMIN D-3 PO) Take 1 capsule by mouth Daily.   Yes Holly Guillen MD   cloNIDine (CATAPRES) 0.2 MG tablet Take 1  tablet by mouth 2 (Two) Times a Day.   Yes Holly Guillen MD   Cyanocobalamin (VITAMIN B 12 PO) Take 1 tablet by mouth Daily.   Yes Holly Guillen MD   diclofenac (VOLTAREN) 50 MG EC tablet Take 1 tablet by mouth 2 (Two) Times a Day As Needed (Headache).   Yes Holly Guillen MD   divalproex (DEPAKOTE ER) 500 MG 24 hr tablet Take 1 tablet by mouth 2 (Two) Times a Day.   Yes Holly Guillen MD   hydrALAZINE (APRESOLINE) 100 MG tablet Take 1 tablet by mouth 3 (Three) Times a Day.   Yes Holly Guillen MD   hydroCHLOROthiazide (HYDRODIURIL) 25 MG tablet Take 1 tablet by mouth Daily.   Yes Holly Guillen MD   lovastatin (MEVACOR) 20 MG tablet Take 1 tablet by mouth Every Night.   Yes Holly Guillen MD   nadolol (CORGARD) 40 MG tablet Take 1 tablet by mouth Daily.   Yes Holly Guillen MD   omeprazole (priLOSEC) 20 MG capsule Take 1 capsule by mouth 2 (Two) Times a Day.   Yes Holly Guillen MD   Testosterone Cypionate 200 MG/ML solution Inject 75 mL as directed 1 (One) Time Per Week. Sundays   Yes Holly Guillen MD   tiZANidine (ZANAFLEX) 4 MG tablet Take 1 tablet by mouth 3 (Three) Times a Day As Needed for Muscle Spasms.   Yes Holly Guillen MD   zolpidem (AMBIEN) 10 MG tablet Take 1 tablet by mouth At Night As Needed for Sleep.   Yes Holly Guillen MD       Allergies:  Lisinopril    Scheduled Meds:allopurinol, 300 mg, Oral, Daily  amLODIPine, 10 mg, Oral, Daily  apixaban, 10 mg, Oral, BID   Followed by  [START ON 2/12/2024] apixaban, 5 mg, Oral, BID  atorvastatin, 10 mg, Oral, Daily  hydroCHLOROthiazide, 12.5 mg, Oral, Daily  nadolol, 40 mg, Oral, Daily  pantoprazole, 40 mg, Oral, Q AM  senna-docusate sodium, 2 tablet, Oral, BID  sodium chloride, 10 mL, Intravenous, Q12H      Continuous Infusions:   PRN Meds:.  acetaminophen **OR** acetaminophen **OR** acetaminophen    senna-docusate sodium **AND** polyethylene glycol **AND** bisacodyl  "**AND** bisacodyl    Calcium Replacement - Follow Nurse / BPA Driven Protocol    diphenhydrAMINE    heparin    heparin    HYDROcodone-acetaminophen    Magnesium Standard Dose Replacement - Follow Nurse / BPA Driven Protocol    nitroglycerin    ondansetron ODT **OR** ondansetron    Phosphorus Replacement - Follow Nurse / BPA Driven Protocol    Potassium Replacement - Follow Nurse / BPA Driven Protocol    [COMPLETED] Insert Peripheral IV **AND** sodium chloride    sodium chloride    sodium chloride    zolpidem    zolpidem      OBJECTIVE    Vital Signs  Vitals:    02/05/24 2300 02/05/24 2356 02/06/24 0327 02/06/24 0718   BP: 137/90  115/81 (!) 148/104   BP Location:   Left arm Left arm   Patient Position:   Lying Lying   Pulse:  67 68 68   Resp:  15  11   Temp:  97.7 °F (36.5 °C) 97.9 °F (36.6 °C) 97.3 °F (36.3 °C)   TempSrc:  Oral Oral Oral   SpO2:  97% 94% 97%   Weight:       Height:           Flowsheet Rows      Flowsheet Row First Filed Value   Admission Height 172.7 cm (68\") Documented at 02/05/2024 1256   Admission Weight 87.7 kg (193 lb 5.5 oz) Documented at 02/05/2024 1256            No intake or output data in the 24 hours ending 02/06/24 0729       Telemetry: Sinus rhythm    Physical Exam:  The patient is alert, oriented and in no distress.  Vital signs as noted above.  Head and neck revealed no carotid bruits or jugular venous distention.  No thyromegaly or lymphadenopathy is present  Lungs clear.  No wheezing.  Breath sounds are normal bilaterally.  Heart normal first and second heart sounds.  No murmur. No precordial rub is present.  No gallop is present.  Abdomen soft and nontender.  No organomegaly is present.  Extremities with good peripheral pulses without any pedal edema.  Skin warm and dry.  Musculoskeletal system is grossly normal.  CNS grossly normal.       Results Review:  I have personally reviewed the results from the time of this admission to 2/6/2024 07:29 EST and agree with these " findings:  []  Laboratory  []  Microbiology  []  Radiology  []  EKG/Telemetry   []  Cardiology/Vascular   []  Pathology  []  Old records  []  Other:    Most notable findings include:    Lab Results (last 24 hours)       Procedure Component Value Units Date/Time    Magnesium [[id number]]  (Abnormal) Collected: 02/06/24 0641    Specimen: Blood Updated: 02/06/24 0718     Magnesium 1.5 mg/dL     Potassium [[id number]]  (Normal) Collected: 02/06/24 0641    Specimen: Blood Updated: 02/06/24 0718     Potassium 3.9 mmol/L     Basic Metabolic Panel [[id number]]  (Abnormal) Collected: 02/06/24 0641    Specimen: Blood Updated: 02/06/24 0718     Glucose 107 mg/dL      BUN 10 mg/dL      Creatinine 0.95 mg/dL      Sodium 141 mmol/L      Potassium 3.9 mmol/L      Chloride 103 mmol/L      CO2 28.0 mmol/L      Calcium 8.7 mg/dL      BUN/Creatinine Ratio 10.5     Anion Gap 10.0 mmol/L      eGFR 96.3 mL/min/1.73     Narrative:      GFR Normal >60  Chronic Kidney Disease <60  Kidney Failure <15      aPTT [[id number]]  (Abnormal) Collected: 02/06/24 0641    Specimen: Blood Updated: 02/06/24 0714     PTT 26.9 seconds     CBC & Differential [[id number]]  (Abnormal) Collected: 02/06/24 0641    Specimen: Blood Updated: 02/06/24 0657    Narrative:      The following orders were created for panel order CBC & Differential.  Procedure                               Abnormality         Status                     ---------                               -----------         ------                     CBC Auto Differential[[id number]]        Abnormal            Final result                 Please view results for these tests on the individual orders.    CBC Auto Differential [[id number]]  (Abnormal) Collected: 02/06/24 0641    Specimen: Blood Updated: 02/06/24 0657     WBC 6.10 10*3/mm3      RBC 4.18 10*6/mm3      Hemoglobin 13.7 g/dL      Hematocrit 40.9 %      MCV 97.8 fL      MCH 32.9 pg      MCHC 33.6 g/dL      RDW 16.9 %      RDW-SD 60.4 fl      MPV  8.0 fL      Platelets 145 10*3/mm3      Neutrophil % 56.1 %      Lymphocyte % 31.5 %      Monocyte % 9.8 %      Eosinophil % 2.1 %      Basophil % 0.5 %      Neutrophils, Absolute 3.40 10*3/mm3      Lymphocytes, Absolute 1.90 10*3/mm3      Monocytes, Absolute 0.60 10*3/mm3      Eosinophils, Absolute 0.10 10*3/mm3      Basophils, Absolute 0.00 10*3/mm3      nRBC 0.2 /100 WBC     POC Activated Clotting Time [[id number]]  (Abnormal) Collected: 02/05/24 1900    Specimen: Arterial Blood Updated: 02/05/24 1929     Activated Clotting Time  255 Seconds      Comment: Serial Number: [id number]:  [id number]       COVID-19, FLU A/B, RSV PCR 1 HR TAT - Swab, Nasopharynx [[id number]]  (Normal) Collected: 02/05/24 1708    Specimen: Swab from Nasopharynx Updated: 02/05/24 1808     COVID19 Not Detected     Influenza A PCR Not Detected     Influenza B PCR Not Detected     RSV, PCR Not Detected    Narrative:      Fact sheet for providers: https://www.fda.gov/media/116301/download    Fact sheet for patients: https://www.fda.gov/media/487884/download    Test performed by PCR.    Basic Metabolic Panel [[id number]]  (Abnormal) Collected: 02/05/24 1409    Specimen: Blood Updated: 02/05/24 1511     Glucose 99 mg/dL      BUN 11 mg/dL      Creatinine 1.07 mg/dL      Sodium 141 mmol/L      Potassium 3.4 mmol/L      Comment: Slight hemolysis detected by analyzer. Result may be falsely elevated.        Chloride 102 mmol/L      CO2 28.0 mmol/L      Calcium 9.2 mg/dL      BUN/Creatinine Ratio 10.3     Anion Gap 11.0 mmol/L      eGFR 83.5 mL/min/1.73     Narrative:      GFR Normal >60  Chronic Kidney Disease <60  Kidney Failure <15      Protime-INR [[id number]]  (Abnormal) Collected: 02/05/24 1409    Specimen: Blood Updated: 02/05/24 1431     Protime 12.1 Seconds      INR 1.12    aPTT [[id number]]  (Abnormal) Collected: 02/05/24 1409    Specimen: Blood Updated: 02/05/24 1431     PTT 25.6 seconds     CBC & Differential [[id number]]  (Abnormal) Collected:  02/05/24 1409    Specimen: Blood Updated: 02/05/24 1422    Narrative:      The following orders were created for panel order CBC & Differential.  Procedure                               Abnormality         Status                     ---------                               -----------         ------                     CBC Auto Differential[[id number]]        Abnormal            Final result                 Please view results for these tests on the individual orders.    CBC Auto Differential [[id number]]  (Abnormal) Collected: 02/05/24 1409    Specimen: Blood Updated: 02/05/24 1422     WBC 6.80 10*3/mm3      RBC 4.30 10*6/mm3      Hemoglobin 14.1 g/dL      Hematocrit 42.7 %      MCV 99.1 fL      MCH 32.7 pg      MCHC 33.0 g/dL      RDW 16.4 %      RDW-SD 56.0 fl      MPV 8.5 fL      Platelets 179 10*3/mm3      Neutrophil % 54.4 %      Lymphocyte % 32.9 %      Monocyte % 9.7 %      Eosinophil % 2.3 %      Basophil % 0.7 %      Neutrophils, Absolute 3.70 10*3/mm3      Lymphocytes, Absolute 2.20 10*3/mm3      Monocytes, Absolute 0.70 10*3/mm3      Eosinophils, Absolute 0.20 10*3/mm3      Basophils, Absolute 0.00 10*3/mm3      nRBC 0.1 /100 WBC             Imaging Results (Last 24 Hours)       Procedure Component Value Units Date/Time    CT Angio Abdominal Aorta Bilateral Iliofem Runoff [[id number]] Collected: 02/05/24 1610     Updated: 02/05/24 1636    Narrative:      CT ANGIO ABDOMINAL AORTA BILAT ILIOFEM RUNOFF    Date of Exam: 2/5/2024 3:06 PM CST    Indication: extensive dvt left leg.    Comparison: Left lower extremity venous Doppler ultrasound 2/5/2024, CT abdomen pelvis 12/27/2010    Technique: CTA of the abdomen, pelvis and both lower extremities was performed before and after the uneventful intravenous administration of 100 cc of Isovue-370. Reconstructed coronal and sagittal images were also obtained. In addition, a 3-D volume   rendered image was created for interpretation. Automated exposure control and  iterative reconstruction methods were used.      Findings:  AORTA:  Normal in caliber throughout. No dissection or penetrating ulcer identified.No significant atherosclerotic disease identified.  MESENTERIC/RENAL VESSELS:  Normal in caliber. No dissection or significant stenosis identified.  PELVIC VESSELS: Normal in caliber. No dissection or significant stenosis identified. There is no evidence of compression of the left common iliac vein by the right common iliac artery.  IVC:  Normal caliber.    RIGHT LOWER EXTREMITY:  Common femoral artery, superficial femoral artery, and popliteal artery are normal in caliber. No significant stenosis.Lower extremity arteries are of normal caliber with three-vessel runoff to the level of the mid calf where the   examination out runs the contrast bolus.    Right lower extremity venous structures are normal in caliber. No intraluminal filling defect/thrombus identified.    LEFT LOWER EXTREMITY:  Common femoral artery, superficial femoral artery, and popliteal artery are normal in caliber. No significant stenosis.Lower extremity arteries are of normal caliber with three-vessel runoff.    Left lower extremity veins are mildly distended with low-density filling defects beginning in the proximal femoral vein and extending through the calf veins, consistent with extensive left lower extremity DVT as seen on same-day ultrasound study. No   extrinsic compressive process is identified to explain patient's DVT.      LOWER THORAX: Unremarkable    LIVER: Unremarkable parenchyma without focal lesion.  BILIARY/GALLBLADDER:  Unremarkable  SPLEEN:  Unremarkable  PANCREAS:  Unremarkable  ADRENAL:  Unremarkable  KIDNEYS: Left kidney is absent. Unremarkable right renal parenchyma with no solid mass identified. No obstruction.  No calculus identified.  GASTROINTESTINAL/MESENTERY:  No evidence of obstruction nor inflammation.      RETROPERITONEUM/LYMPH NODES:  Unremarkable    REPRODUCTIVE:   Unremarkable  BLADDER:  Unremarkable    OSSEUS STRUCTURES:  Typical for age with no acute process identified.      Impression:      Impression:  1.Left lower extremity DVT from the proximal femoral vein through the calf veins. No extrinsic compressive process upon the left lower extremity venous vasculature is identified to explain findings. No evidence of May-Thurner syndrome.  2.Absent left kidney. Correlate with history.  3.Left lower extremity three-vessel runoff. Distal right calf vessels not visualized due to exam timing. Proximal right calf arteries are widely patent.      Electronically Signed: Donavan Waterman MD    2/5/2024 3:21 PM CST    Workstation ID: [id number]            LAB RESULTS (LAST 7 DAYS)    CBC  Results from last 7 days   Lab Units 02/06/24  0641 02/05/24  1409   WBC 10*3/mm3 6.10 6.80   RBC 10*6/mm3 4.18 4.30   HEMOGLOBIN g/dL 13.7 14.1   HEMATOCRIT % 40.9 42.7   MCV fL 97.8* 99.1*   PLATELETS 10*3/mm3 145 179       BMP  Results from last 7 days   Lab Units 02/06/24  0641 02/05/24  1409   SODIUM mmol/L 141 141   POTASSIUM mmol/L 3.9  3.9 3.4*   CHLORIDE mmol/L 103 102   CO2 mmol/L 28.0 28.0   BUN mg/dL 10 11   CREATININE mg/dL 0.95 1.07   GLUCOSE mg/dL 107* 99   MAGNESIUM mg/dL 1.5*  --        CMP   Results from last 7 days   Lab Units 02/06/24  0641 02/05/24  1409   SODIUM mmol/L 141 141   POTASSIUM mmol/L 3.9  3.9 3.4*   CHLORIDE mmol/L 103 102   CO2 mmol/L 28.0 28.0   BUN mg/dL 10 11   CREATININE mg/dL 0.95 1.07   GLUCOSE mg/dL 107* 99       BNP        TROPONIN        CoAg  Results from last 7 days   Lab Units 02/06/24  0641 02/05/24  1409   INR   --  1.12*   APTT seconds 26.9* 25.6*       Creatinine Clearance  Estimated Creatinine Clearance: 104.2 mL/min (by C-G formula based on SCr of 0.95 mg/dL).    ABG        Radiology  Cardiac Catheterization/Vascular Study    Addendum Date: 2/5/2024    PROCEDURES PERFORMED Ultrasound guided access of the left popliteal vein Selective cath placement in  the left popliteal Vein 34927 Selective cath placement in the left femoral Vein 80393 Selective cath placement in the left iliac Vein  58602 Selective cath placement in the IVC  62718 Venogram of the left popliteal Vein 65972, 59892 Venogram of the left femoral Vein 67129 Venogram of the left the Iliac Vein 12049 Venogram of the IVC 80162 Extirpation of matter from the left iliac vein, left femoral vein and left popliteal vein via mechanical thrombectomy 36465 Intravascular ultrasound noncoronary vessel left iliac vein 28188 Intravascular ultrasound noncoronary vessel, additional vessel, left femoral and popliteal veins.  29291 Transluminal balloon angioplasty of left iliac vein.  93909 Transluminal balloon angioplasty of left femoral vein 38062  PATIENT HISTORY/indication 53-year-old man with longstanding history of leg discomfort presented to the ER with new diagnosis of DVT in the entire venous system of the left leg including left iliac and left femoral veins.  After discussion of risk and benefit of the procedure he was brought to the cardiac Cath Lab for mechanical thrombectomy.  PROCEDURE IN DETAIL After obtaining informed consent, patient was placed in prone position.  Left popliteal area was prepped and draped in standard fashion.  US guidance was used to identify left popliteal vein.  Seldinger technique was used to introduce a micropuncture sheath followed by 7 F sheath.   We first advanced the advantage glide wire to the IVC.  The tract was dilated up and the ClotTriever 13F sheath was placed.  Venograms of the left popliteal vein, left femoral vein, left iliac vein, and IVC were obtained. FINDINGS Ultrasound image of the right popliteal vein demonstrated thrombus and non compressible popliteal vein Venograms: Left popliteal Vein: Large occlusive thrombus throughout the segment. There were tandem 80 % stenosis of mid left iliac vein. Left femoral Vein:Occlusive large thrombus in the left common femoral  vein. Left iliac Vein: Partially occlusive thrombus in the left iliac vein. IVC: Patent IVUS and Balloon angioplasty A peripheral intravascular ultrasound catheter was advanced over the 0.035 advantage Glidewire and measurements were obtained.  A slow manual pullback of IVUS catheter was performed from left iliac to femoral to popliteal vein. IVUS showed common femoral to be 12 mm in average diameter.  Left external iliac had smooth 80% stenosis throughout its length.  Proximal segment of left common femoral vein also had 70% stenosis.  Common iliac measured 15 mm in dimension.  IVUS also confirmed heavy burden of thrombus in the left external iliac vein, left femoral, popliteal veins.  Popliteal vein measured to be 8 x 10 mm in dimension. We then advanced a 12 x 60 mm [place] balloon on a 135 cm shaft and performed serial dilation of left external iliac vein and left femoral vein. Post balloon angioplasty the stenosis reduced to 20% in both femoral and iliac veins. Venous mechanical thrombectomy The Inari ClotTriever device was then selectively placed.  We performed mechanical thrombectomy in the IVC, left iliac and left common femoral and left popliteal vein.  We selectively placed the ClotTriever catheter into each vein treated.  After 4 passes had been completed, large amount of clot was removed.  Completion venography confirmed complete clot removal and resolution of DVT in the popliteal, femoral, iliac veins.  Wires and sheath removed and manual pressure was applied for 15 minutes to achieve hemostasis.  ESTIMATED BLOOD LOSS 10 cc  DETAILS Contrast:  30 cc Sedation Time: 45 minutes  IMPRESSION Technically successful IVUS guided balloon angioplasty of left external iliac and femoral veins. Extirpation of matter via mechanical thrombectomy of the left lower extremity including left external iliac, femoral and popliteal veins.  PLAN Patient to be admitted to the floor with close observation Continue heparin drip  Transition to NOAC tonight. Electronically signed by Enrique To MD, 02/05/24, 7:39 PM EST.     CT Angio Abdominal Aorta Bilateral Iliofem Runoff    Result Date: 2/5/2024  Impression: 1.Left lower extremity DVT from the proximal femoral vein through the calf veins. No extrinsic compressive process upon the left lower extremity venous vasculature is identified to explain findings. No evidence of May-Thurner syndrome. 2.Absent left kidney. Correlate with history. 3.Left lower extremity three-vessel runoff. Distal right calf vessels not visualized due to exam timing. Proximal right calf arteries are widely patent. Electronically Signed: Donavan Waterman MD  2/5/2024 3:21 PM CST  Workstation ID: [id number]       EKG  I personally viewed and interpreted the patient's EKG/Telemetry data:  SCANNED - TELEMETRY     Final Result      SCANNED - TELEMETRY     Final Result            Echocardiogram:    Results for orders placed during the hospital encounter of 08/14/23    Adult Transthoracic Echo Complete w/ Color, Spectral and Contrast if Necessary Per Protocol    Interpretation Summary    Left ventricular ejection fraction appears to be 56 - 60%.    Estimated right ventricular systolic pressure from tricuspid regurgitation is normal (<35 mmHg).    Indications  Syncope    Technically satisfactory study.  Mitral valve is structurally normal.  Tricuspid valve is structurally normal.  Aortic valve is structurally normal.  Pulmonic valve could not be well visualized.  No evidence for mitral tricuspid or aortic regurgitation is seen by Doppler study.  Left atrium is enlarged.  Right atrium is normal in size.  Left ventricle is normal in size and contractility with ejection fraction of 60%.  Diastolic dysfunction.  Right ventricle is normal in size.  Atrial septum is intact.  Aorta is normal.  No pericardial effusion or intracardiac thrombus is seen.    Impression  Structurally and functionally normal cardiac valves.  Left ventricular  size and contractility is normal with ejection fraction of 60%.  Diastolic dysfunction.  Mild left atrial enlargement.        Stress Test:         Cardiac Catheterization:  Results for orders placed during the hospital encounter of 02/05/24    Cardiac Catheterization/Vascular Study    Narrative  PROCEDURES PERFORMED  Ultrasound guided access of the left popliteal vein  Selective cath placement in the left popliteal Vein 35469  Selective cath placement in the left femoral Vein 00911  Selective cath placement in the left iliac Vein  36383  Selective cath placement in the IVC  25015  Venogram of the left popliteal Vein 98507, 00987  Venogram of the left femoral Vein 15510  Venogram of the left the Iliac Vein 37315  Venogram of the IVC 70493  Extirpation of matter from the left iliac vein, left femoral vein and left popliteal vein via mechanical thrombectomy 73080  Intravascular ultrasound noncoronary vessel left iliac vein 66971  Intravascular ultrasound noncoronary vessel, additional vessel, left femoral and popliteal veins.  91206  Transluminal balloon angioplasty of left iliac vein.  52266  Transluminal balloon angioplasty of left femoral vein 30636      PATIENT HISTORY/indication  53-year-old man with longstanding history of leg discomfort presented to the ER with new diagnosis of DVT in the entire venous system of the left leg including left iliac and left femoral veins.  After discussion of risk and benefit of the procedure he was brought to the cardiac Cath Lab for mechanical thrombectomy.      PROCEDURE IN DETAIL  After obtaining informed consent, patient was placed in prone position.  Left popliteal area was prepped and draped in standard fashion.  US guidance was used to identify left popliteal vein.  Seldinger technique was used to introduce a micropuncture sheath followed by 7 F sheath.   We first advanced the advantage glide wire to the IVC.  The tract was dilated up and the ClotTriever 13F sheath was  placed.  Venograms of the left popliteal vein, left femoral vein, left iliac vein, and IVC were obtained.    FINDINGS  Ultrasound image of the right popliteal vein demonstrated thrombus and non compressible popliteal vein  Venograms:  Left popliteal Vein: Large occlusive thrombus throughout the segment. There were tandem 80 % stenosis of mid left iliac vein.  Left femoral Vein:Occlusive large thrombus in the left common femoral vein.  Left iliac Vein: Partially occlusive thrombus in the left iliac vein.  IVC: Patent    IVUS and Balloon angioplasty  A peripheral intravascular ultrasound catheter was advanced over the 0.035 advantage Glidewire and measurements were obtained.  A slow manual pullback of IVUS catheter was performed from left iliac to femoral to popliteal vein.    IVUS showed common femoral to be 12 mm in average diameter.  Left external iliac had smooth 80% stenosis throughout its length.  Proximal segment of left common femoral vein also had 70% stenosis.  Common iliac measured 15 mm in dimension.  IVUS also confirmed heavy burden of thrombus in the left external iliac vein, left femoral, popliteal veins.  Popliteal vein measured to be 8 x 10 mm in dimension.    We then advanced a 12 x 60 mm Gerber balloon on a 135 cm shaft and performed serial dilation of left external iliac vein and left femoral vein.  Post balloon angioplasty the stenosis reduced to 20% in both femoral and iliac veins.    Venous mechanical thrombectomy  The Inari ClotTriever device was then selectively placed.  We performed mechanical thrombectomy in the IVC, left iliac and left common femoral and left popliteal vein.  We selectively placed the ClotTriever catheter into each vein treated.  After 4 passes had been completed, large amount of clot was removed.  Completion venography confirmed complete clot removal and resolution of DVT in the popliteal, femoral, iliac veins.    Wires and sheath removed and manual pressure was applied for  15 minutes to achieve hemostasis.    ESTIMATED BLOOD LOSS  10 cc    DETAILS  Contrast:  30 cc  Sedation Time: 45 minutes    IMPRESSION  Technically successful IVUS guided balloon angioplasty of left external iliac and femoral veins.  Extirpation of matter via mechanical thrombectomy of the left lower extremity including left external iliac, femoral and popliteal veins.    PLAN  Patient to be admitted to the floor with close observation  Continue heparin drip  Transition to NOAC tonight.    Electronically signed by Enrique To MD, 02/05/24, 7:39 PM EST.         Other:         ASSESSMENT & PLAN:    Principal Problem:    DVT (deep venous thrombosis)    DVT  Extensive DVT of the left leg  Doppler ultrasound and CT abdomen suggestive of proximal femoral and iliac vein involvement.  Patient is unable to bear weight due to significant pain/discomfort.  I discussed possibility of medical management only with anticoagulation versus combination of mechanical thrombectomy and anticoagulation.  He is status post mechanical thrombectomy on 2/5/2024  Heparin drip has been stopped  Started on loading dose of Eliquis  Continue Eliquis 5 mg p.o. twice daily for 6 months  Encouraged ambulation     Hypertension  Better controlled now  Continue home medications amlodipine and clonidine, hydrochlorothiazide     Solitary kidney  Patient was born with a solitary kidney  Closely monitor renal function as he received contrast during CT scan.   Creatinine 0.95, GFR is 96.3   Potassium has been replaced now 3.9     Hyperlipidemia  Continue statin  Obtain a lipid panel     Gout  Continue allopurinol     Overweight  BMI is 27.15  Dietary changes and lifestyle modifications discussed with the patient.  Screening and treatment for sleep apnea also suggested  A previous echocardiogram showed preserved LV function and no significant valvular abnormalities in August 2023.    Okay to be discharged from cardiac and DVT standpoint.  Follow-up in the  office in 4 weeks.      Enrique To MD  02/06/24  07:29 EST                  Electronically signed by Enrique To MD at 02/06/24 0933          Consult Notes (last 24 hours)        Enrique To MD at 02/05/24 1719        Consult Orders    1. Cardiology (on-call MD unless specified) [[id number]] ordered by Romulo Colon MD at 02/05/24 1529                   Referring Provider: Romulo Colon MD    Reason for Consultation: DVT      Patient Care Team:  Kaleigh Strickland APRN as PCP - General (Nurse Practitioner)      SUBJECTIVE     Chief Complaint: Left leg pain and swelling    History of present illness:  Spencer Brothers is a 53 y.o. male with hypertension, hyperlipidemia, gout who presents to the hospital with complaints of left lower extremity pain and swelling.  He has been having difficulty with ambulation and pain ongoing for the last 1 year.  Recently the pain migrated to his groin and also his lower extremity started to swell up with more redness.  He had an ultrasound of lower extremity which showed extensive thrombus in the entire venous system of left leg.  A CT abdomen showed extensive thrombus in the femoral vein with no evidence of external compression.  Interventional cardiology has been consulted to evaluate him for mechanical thrombectomy given severity of symptoms, young age and e heavy thrombus burden.  Of note he denies any chest pain, shortness of breath.    Review of systems:    Constitutional: No weakness, fatigue, fever, rigors, chills   Eyes: No vision changes, eye pain   ENT/oropharynx: No difficulty swallowing, sore throat, epistaxis, changes in hearing   Cardiovascular: No chest pain, chest tightness, palpitations, paroxysmal nocturnal dyspnea, orthopnea, diaphoresis, dizziness / syncopal episode   Respiratory: No shortness of breath, dyspnea on exertion, cough, wheezing, hemoptysis   Gastrointestinal: No abdominal pain, nausea, vomiting, diarrhea, bloody stools   Genitourinary: No  hematuria, dysuria   Neurological: No headache, tremors, numbness, one-sided weakness    Musculoskeletal: No cramps, myalgias, joint pain, joint swelling   Integument: No rash, edema        Personal History:      Past Medical History:   Diagnosis Date    Allergic     Hypertension        No past surgical history on file.    Family History   Problem Relation Age of Onset    Heart disease Mother     Cancer Mother     Diabetes Maternal Uncle        Social History     Tobacco Use    Smoking status: Never    Smokeless tobacco: Never   Vaping Use    Vaping Use: Never used   Substance Use Topics    Alcohol use: Yes     Comment: socially on occasion    Drug use: Never        Home meds:  Prior to Admission medications    Medication Sig Start Date End Date Taking? Authorizing Provider   acetaminophen (TYLENOL) 325 MG tablet Take 2 tablets by mouth Every 6 (Six) Hours As Needed for Mild Pain.   Yes Holly Guillen MD   allopurinol (ZYLOPRIM) 300 MG tablet Take 1 tablet by mouth Daily.   Yes Holly Guillen MD   celecoxib (CeleBREX) 200 MG capsule Take 1 capsule by mouth Daily.   Yes Holly Guillen MD   Cholecalciferol (VITAMIN D-3 PO) Take 1 capsule by mouth Daily.   Yes Holly Guillen MD   cloNIDine (CATAPRES) 0.2 MG tablet Take 1 tablet by mouth 2 (Two) Times a Day.   Yes Holly Guillen MD   Cyanocobalamin (VITAMIN B 12 PO) Take 1 tablet by mouth Daily.   Yes Holly Guillen MD   diclofenac (VOLTAREN) 50 MG EC tablet Take 1 tablet by mouth 2 (Two) Times a Day As Needed (Headache).   Yes Holly Guillen MD   divalproex (DEPAKOTE ER) 500 MG 24 hr tablet Take 1 tablet by mouth 2 (Two) Times a Day.   Yes Holly Guillen MD   hydrALAZINE (APRESOLINE) 100 MG tablet Take 1 tablet by mouth 3 (Three) Times a Day.   Yes Holly Guillen MD   hydroCHLOROthiazide (HYDRODIURIL) 25 MG tablet Take 1 tablet by mouth Daily.   Yes Holly Guillen MD   lovastatin (MEVACOR) 20 MG  tablet Take 1 tablet by mouth Every Night.   Yes Holly Guillen MD   nadolol (CORGARD) 40 MG tablet Take 1 tablet by mouth Daily.   Yes Holly Guillen MD   omeprazole (priLOSEC) 20 MG capsule Take 1 capsule by mouth 2 (Two) Times a Day.   Yes Holly Guillen MD   Testosterone Cypionate 200 MG/ML solution Inject 75 mL as directed 1 (One) Time Per Week. Sundays   Yes Holly Guillen MD   tiZANidine (ZANAFLEX) 4 MG tablet Take 1 tablet by mouth 3 (Three) Times a Day As Needed for Muscle Spasms.   Yes Holly Guillen MD   zolpidem (AMBIEN) 10 MG tablet Take 1 tablet by mouth At Night As Needed for Sleep.   Yes Holly Guillen MD   amLODIPine (NORVASC) 10 MG tablet Take 1 tablet by mouth Daily.  2/5/24  Holly Guillen MD   aspirin 81 MG EC tablet Take 1 tablet by mouth Daily.  2/5/24  Holly Guillen MD   divalproex (DEPAKOTE ER) 500 MG 24 hr tablet Take 1 tablet by mouth 2 (Two) Times a Day for 30 days. 8/15/23 2/5/24  Ashley Najera DO   fluconazole (DIFLUCAN) 150 MG tablet Take 1 tablet by mouth Every 7 (Seven) Days. Tuesdays 2/5/24  Holly Guillen MD   fluticasone (FLONASE) 50 MCG/ACT nasal spray 2 sprays into the nostril(s) as directed by provider Daily.  2/5/24  Holly Guillen MD   guaiFENesin (MUCINEX) 600 MG 12 hr tablet Take 2 tablets by mouth 2 (Two) Times a Day As Needed.  2/5/24  Holly Guillen MD   ibuprofen (ADVIL,MOTRIN) 200 MG tablet Take 1 tablet by mouth Every 6 (Six) Hours As Needed for Mild Pain.  2/5/24  Holly Guillen MD       Allergies:     Lisinopril    Scheduled Meds:   Continuous Infusions:heparin, 17.1 Units/kg/hr, Last Rate: 17.1 Units/kg/hr (02/05/24 1616)      PRN Meds:  heparin    heparin    [COMPLETED] Insert Peripheral IV **AND** sodium chloride      OBJECTIVE    Vital Signs  Vitals:    02/05/24 1256 02/05/24 1336 02/05/24 1430 02/05/24 1530   BP: (!) 170/107 (!) 145/103 (!) 150/102 (!) 163/104   BP Location:  "Left arm Left arm Left arm Left arm   Patient Position: Sitting Lying Lying Lying   Pulse: 67 64 66 56   Resp: 18 12 13 13   Temp: 98.3 °F (36.8 °C)      SpO2: 99% 97% 95% 97%   Weight: 87.7 kg (193 lb 5.5 oz)      Height: 172.7 cm (68\")          Flowsheet Rows      Flowsheet Row First Filed Value   Admission Height 172.7 cm (68\") Documented at 02/05/2024 1256   Admission Weight 87.7 kg (193 lb 5.5 oz) Documented at 02/05/2024 1256            No intake or output data in the 24 hours ending 02/05/24 1719     Telemetry: Sinus rhythm    Physical Exam:  The patient is alert, oriented and in no distress.  Overweight  Vital signs as noted above.  Head and neck revealed no carotid bruits or jugular venous distention.  No thyromegaly or lymphadenopathy is present  Lungs clear.  No wheezing.  Breath sounds are normal bilaterally.  Heart: Normal first and second heart sounds. No murmur.  No precordial rub is present.  No gallop is present.  Abdomen: Soft and nontender.  No organomegaly is present.  Extremities : Significantly swollen left leg compared to right  Skin: Erythematous skin on the right lower extremity  Musculoskeletal system is grossly normal.  CNS grossly normal.       Results Review:  I have personally reviewed the results from the time of this admission to 2/5/2024 17:19 EST and agree with these findings:  []  Laboratory  []  Microbiology  []  Radiology  []  EKG/Telemetry   []  Cardiology/Vascular   []  Pathology  []  Old records  []  Other:    Most notable findings include:     Lab Results (last 24 hours)       Procedure Component Value Units Date/Time    COVID-19, FLU A/B, RSV PCR 1 HR TAT - Swab, Nasopharynx [[id number]] Collected: 02/05/24 1708    Specimen: Swab from Nasopharynx Updated: 02/05/24 1711    Basic Metabolic Panel [[id number]]  (Abnormal) Collected: 02/05/24 1409    Specimen: Blood Updated: 02/05/24 1511     Glucose 99 mg/dL      BUN 11 mg/dL      Creatinine 1.07 mg/dL      Sodium 141 mmol/L      " Potassium 3.4 mmol/L      Comment: Slight hemolysis detected by analyzer. Result may be falsely elevated.        Chloride 102 mmol/L      CO2 28.0 mmol/L      Calcium 9.2 mg/dL      BUN/Creatinine Ratio 10.3     Anion Gap 11.0 mmol/L      eGFR 83.5 mL/min/1.73     Narrative:      GFR Normal >60  Chronic Kidney Disease <60  Kidney Failure <15      Protime-INR [[id number]]  (Abnormal) Collected: 02/05/24 1409    Specimen: Blood Updated: 02/05/24 1431     Protime 12.1 Seconds      INR 1.12    aPTT [[id number]]  (Abnormal) Collected: 02/05/24 1409    Specimen: Blood Updated: 02/05/24 1431     PTT 25.6 seconds     CBC & Differential [[id number]]  (Abnormal) Collected: 02/05/24 1409    Specimen: Blood Updated: 02/05/24 1422    Narrative:      The following orders were created for panel order CBC & Differential.  Procedure                               Abnormality         Status                     ---------                               -----------         ------                     CBC Auto Differential[[id number]]        Abnormal            Final result                 Please view results for these tests on the individual orders.    CBC Auto Differential [[id number]]  (Abnormal) Collected: 02/05/24 1409    Specimen: Blood Updated: 02/05/24 1422     WBC 6.80 10*3/mm3      RBC 4.30 10*6/mm3      Hemoglobin 14.1 g/dL      Hematocrit 42.7 %      MCV 99.1 fL      MCH 32.7 pg      MCHC 33.0 g/dL      RDW 16.4 %      RDW-SD 56.0 fl      MPV 8.5 fL      Platelets 179 10*3/mm3      Neutrophil % 54.4 %      Lymphocyte % 32.9 %      Monocyte % 9.7 %      Eosinophil % 2.3 %      Basophil % 0.7 %      Neutrophils, Absolute 3.70 10*3/mm3      Lymphocytes, Absolute 2.20 10*3/mm3      Monocytes, Absolute 0.70 10*3/mm3      Eosinophils, Absolute 0.20 10*3/mm3      Basophils, Absolute 0.00 10*3/mm3      nRBC 0.1 /100 WBC             Imaging Results (Last 24 Hours)       Procedure Component Value Units Date/Time    CT Angio Abdominal Aorta  Bilateral Iliofem Runoff [[id number]] Collected: 02/05/24 1610     Updated: 02/05/24 1636    Narrative:      CT ANGIO ABDOMINAL AORTA BILAT ILIOFEM RUNOFF    Date of Exam: 2/5/2024 3:06 PM CST    Indication: extensive dvt left leg.    Comparison: Left lower extremity venous Doppler ultrasound 2/5/2024, CT abdomen pelvis 12/27/2010    Technique: CTA of the abdomen, pelvis and both lower extremities was performed before and after the uneventful intravenous administration of 100 cc of Isovue-370. Reconstructed coronal and sagittal images were also obtained. In addition, a 3-D volume   rendered image was created for interpretation. Automated exposure control and iterative reconstruction methods were used.      Findings:  AORTA:  Normal in caliber throughout. No dissection or penetrating ulcer identified.No significant atherosclerotic disease identified.  MESENTERIC/RENAL VESSELS:  Normal in caliber. No dissection or significant stenosis identified.  PELVIC VESSELS: Normal in caliber. No dissection or significant stenosis identified. There is no evidence of compression of the left common iliac vein by the right common iliac artery.  IVC:  Normal caliber.    RIGHT LOWER EXTREMITY:  Common femoral artery, superficial femoral artery, and popliteal artery are normal in caliber. No significant stenosis.Lower extremity arteries are of normal caliber with three-vessel runoff to the level of the mid calf where the   examination out runs the contrast bolus.    Right lower extremity venous structures are normal in caliber. No intraluminal filling defect/thrombus identified.    LEFT LOWER EXTREMITY:  Common femoral artery, superficial femoral artery, and popliteal artery are normal in caliber. No significant stenosis.Lower extremity arteries are of normal caliber with three-vessel runoff.    Left lower extremity veins are mildly distended with low-density filling defects beginning in the proximal femoral vein and extending through  the calf veins, consistent with extensive left lower extremity DVT as seen on same-day ultrasound study. No   extrinsic compressive process is identified to explain patient's DVT.      LOWER THORAX: Unremarkable    LIVER: Unremarkable parenchyma without focal lesion.  BILIARY/GALLBLADDER:  Unremarkable  SPLEEN:  Unremarkable  PANCREAS:  Unremarkable  ADRENAL:  Unremarkable  KIDNEYS: Left kidney is absent. Unremarkable right renal parenchyma with no solid mass identified. No obstruction.  No calculus identified.  GASTROINTESTINAL/MESENTERY:  No evidence of obstruction nor inflammation.      RETROPERITONEUM/LYMPH NODES:  Unremarkable    REPRODUCTIVE:  Unremarkable  BLADDER:  Unremarkable    OSSEUS STRUCTURES:  Typical for age with no acute process identified.      Impression:      Impression:  1.Left lower extremity DVT from the proximal femoral vein through the calf veins. No extrinsic compressive process upon the left lower extremity venous vasculature is identified to explain findings. No evidence of May-Thurner syndrome.  2.Absent left kidney. Correlate with history.  3.Left lower extremity three-vessel runoff. Distal right calf vessels not visualized due to exam timing. Proximal right calf arteries are widely patent.      Electronically Signed: Donavan Waterman MD    2/5/2024 3:21 PM CST    Workstation ID: [id number]            LAB RESULTS (LAST 7 DAYS)    CBC  Results from last 7 days   Lab Units 02/05/24  1409   WBC 10*3/mm3 6.80   RBC 10*6/mm3 4.30   HEMOGLOBIN g/dL 14.1   HEMATOCRIT % 42.7   MCV fL 99.1*   PLATELETS 10*3/mm3 179       BMP  Results from last 7 days   Lab Units 02/05/24  1409   SODIUM mmol/L 141   POTASSIUM mmol/L 3.4*   CHLORIDE mmol/L 102   CO2 mmol/L 28.0   BUN mg/dL 11   CREATININE mg/dL 1.07   GLUCOSE mg/dL 99       CMP   Results from last 7 days   Lab Units 02/05/24  1409   SODIUM mmol/L 141   POTASSIUM mmol/L 3.4*   CHLORIDE mmol/L 102   CO2 mmol/L 28.0   BUN mg/dL 11   CREATININE mg/dL 1.07    GLUCOSE mg/dL 99       BNP        TROPONIN        CoAg  Results from last 7 days   Lab Units 02/05/24  1409   INR  1.12*   APTT seconds 25.6*       Creatinine Clearance  Estimated Creatinine Clearance: 86.9 mL/min (by C-G formula based on SCr of 1.07 mg/dL).    ABG          Radiology  CT Angio Abdominal Aorta Bilateral Iliofem Runoff    Result Date: 2/5/2024  Impression: 1.Left lower extremity DVT from the proximal femoral vein through the calf veins. No extrinsic compressive process upon the left lower extremity venous vasculature is identified to explain findings. No evidence of May-Thurner syndrome. 2.Absent left kidney. Correlate with history. 3.Left lower extremity three-vessel runoff. Distal right calf vessels not visualized due to exam timing. Proximal right calf arteries are widely patent. Electronically Signed: Donavan Waterman MD  2/5/2024 3:21 PM CST  Workstation ID: [id number]       EKG  I personally viewed and interpreted the patient's EKG/Telemetry data:  No orders to display         Echocardiogram:    Results for orders placed during the hospital encounter of 08/14/23    Adult Transthoracic Echo Complete w/ Color, Spectral and Contrast if Necessary Per Protocol    Interpretation Summary    Left ventricular ejection fraction appears to be 56 - 60%.    Estimated right ventricular systolic pressure from tricuspid regurgitation is normal (<35 mmHg).    Indications  Syncope    Technically satisfactory study.  Mitral valve is structurally normal.  Tricuspid valve is structurally normal.  Aortic valve is structurally normal.  Pulmonic valve could not be well visualized.  No evidence for mitral tricuspid or aortic regurgitation is seen by Doppler study.  Left atrium is enlarged.  Right atrium is normal in size.  Left ventricle is normal in size and contractility with ejection fraction of 60%.  Diastolic dysfunction.  Right ventricle is normal in size.  Atrial septum is intact.  Aorta is normal.  No pericardial  effusion or intracardiac thrombus is seen.    Impression  Structurally and functionally normal cardiac valves.  Left ventricular size and contractility is normal with ejection fraction of 60%.  Diastolic dysfunction.  Mild left atrial enlargement.        Stress Test:        Cardiac Catheterization:  No results found for this or any previous visit.        Other:      ASSESSMENT & PLAN:    Principal Problem:    DVT (deep venous thrombosis)    DVT  Extensive DVT of the left leg  Doppler ultrasound and CT abdomen suggestive of proximal involvement.  Patient is unable to bear weight due to significant pain/discomfort.  I have discussed possibility of medical management only with anticoagulation versus combination of mechanical thrombectomy and anticoagulation.  Procedure was described to the patient in details and risk and benefit were discussed.  Patient would like to proceed with mechanical thrombectomy followed by initiation of oral anticoagulation.  He will be started on IV heparin in the meantime    Hypertension  Uncontrolled hypertension  Resume home medications which include amlodipine and clonidine, hydrochlorothiazide    Solitary kidney  Patient was born with a solitary kidney  Closely monitor renal function as he received contrast during CT scan.    Replace potassium currently 3.3    Hyperlipidemia  Obtain a lipid panel  Resume statin    Gout  Resume home medications: Allopurinol    Overweight  BMI is 29.4  Dietary changes and lifestyle modifications discussed with the patient.  Screening and treatment for sleep apnea also suggested  A previous echocardiogram showed preserved LV function and no significant valvular abnormalities in August 2023    Enrique To MD  02/05/24  17:19 EST                Electronically signed by Enrique To MD at 02/05/24 2330

## 2024-02-06 NOTE — NURSING NOTE
Orders given by Dr. Cheng to cancel norco prescription that was sent to pt's pharmacy in [place]. Called pharmacy and was on hold for 20 min, and was informed they were on lunch. Left a message with callback #.

## 2024-02-06 NOTE — PROGRESS NOTES
Referring Provider: Sophia Cheng MD    Reason for follow-up: DVT     Patient Care Team:  Kaleigh Strickland APRN as PCP - General (Nurse Practitioner)      SUBJECTIVE  Complains of some postprocedure discomfort at the access site otherwise able to ambulate without difficulty.  Overall feels better.     ROS  Review of all systems negative except as indicated.    Since I have last seen, the patient has been without any chest discomfort, shortness of breath, palpitations, dizziness or syncope.  Denies having any headache, abdominal pain, nausea, vomiting, diarrhea, constipation, loss of weight or loss of appetite.  Denies having any excessive bruising, hematuria or blood in the stool.  ROS      Personal History:    Past Medical History:   Diagnosis Date    Allergic     Arthritis     GERD (gastroesophageal reflux disease)     Hyperlipidemia     Hypertension        Past Surgical History:   Procedure Laterality Date    COLONOSCOPY         Family History   Problem Relation Age of Onset    Heart disease Mother     Cancer Mother     Diabetes Maternal Uncle        Social History     Tobacco Use    Smoking status: Never    Smokeless tobacco: Never   Vaping Use    Vaping Use: Never used   Substance Use Topics    Alcohol use: Yes     Comment: socially on occasion    Drug use: Never        Home meds:  Prior to Admission medications    Medication Sig Start Date End Date Taking? Authorizing Provider   acetaminophen (TYLENOL) 325 MG tablet Take 2 tablets by mouth Every 6 (Six) Hours As Needed for Mild Pain.   Yes Holly Guillen MD   allopurinol (ZYLOPRIM) 300 MG tablet Take 1 tablet by mouth Daily.   Yes Holly Guillen MD   celecoxib (CeleBREX) 200 MG capsule Take 1 capsule by mouth Daily.   Yes Holly Guillen MD   Cholecalciferol (VITAMIN D-3 PO) Take 1 capsule by mouth Daily.   Yes Holly Guillen MD   cloNIDine (CATAPRES) 0.2 MG tablet Take 1 tablet by mouth 2 (Two) Times a Day.   Yes Mindy  MD Holly   Cyanocobalamin (VITAMIN B 12 PO) Take 1 tablet by mouth Daily.   Yes Holly Guillen MD   diclofenac (VOLTAREN) 50 MG EC tablet Take 1 tablet by mouth 2 (Two) Times a Day As Needed (Headache).   Yes Holly Guillen MD   divalproex (DEPAKOTE ER) 500 MG 24 hr tablet Take 1 tablet by mouth 2 (Two) Times a Day.   Yes Holly Guillen MD   hydrALAZINE (APRESOLINE) 100 MG tablet Take 1 tablet by mouth 3 (Three) Times a Day.   Yes Holly Guillen MD   hydroCHLOROthiazide (HYDRODIURIL) 25 MG tablet Take 1 tablet by mouth Daily.   Yes Holly Guillen MD   lovastatin (MEVACOR) 20 MG tablet Take 1 tablet by mouth Every Night.   Yes Holly Guillen MD   nadolol (CORGARD) 40 MG tablet Take 1 tablet by mouth Daily.   Yes Holly Guillen MD   omeprazole (priLOSEC) 20 MG capsule Take 1 capsule by mouth 2 (Two) Times a Day.   Yes Holly Guillen MD   Testosterone Cypionate 200 MG/ML solution Inject 75 mL as directed 1 (One) Time Per Week. Sundays   Yes Holly Guillen MD   tiZANidine (ZANAFLEX) 4 MG tablet Take 1 tablet by mouth 3 (Three) Times a Day As Needed for Muscle Spasms.   Yes Holly Guillen MD   zolpidem (AMBIEN) 10 MG tablet Take 1 tablet by mouth At Night As Needed for Sleep.   Yes ProviderHolly MD       Allergies:  Lisinopril    Scheduled Meds:allopurinol, 300 mg, Oral, Daily  amLODIPine, 10 mg, Oral, Daily  apixaban, 10 mg, Oral, BID   Followed by  [START ON 2/12/2024] apixaban, 5 mg, Oral, BID  atorvastatin, 10 mg, Oral, Daily  hydroCHLOROthiazide, 12.5 mg, Oral, Daily  nadolol, 40 mg, Oral, Daily  pantoprazole, 40 mg, Oral, Q AM  senna-docusate sodium, 2 tablet, Oral, BID  sodium chloride, 10 mL, Intravenous, Q12H      Continuous Infusions:   PRN Meds:.  acetaminophen **OR** acetaminophen **OR** acetaminophen    senna-docusate sodium **AND** polyethylene glycol **AND** bisacodyl **AND** bisacodyl    Calcium Replacement - Follow Nurse  "/ BPA Driven Protocol    diphenhydrAMINE    heparin    heparin    HYDROcodone-acetaminophen    Magnesium Standard Dose Replacement - Follow Nurse / BPA Driven Protocol    nitroglycerin    ondansetron ODT **OR** ondansetron    Phosphorus Replacement - Follow Nurse / BPA Driven Protocol    Potassium Replacement - Follow Nurse / BPA Driven Protocol    [COMPLETED] Insert Peripheral IV **AND** sodium chloride    sodium chloride    sodium chloride    zolpidem    zolpidem      OBJECTIVE    Vital Signs  Vitals:    02/05/24 2300 02/05/24 2356 02/06/24 0327 02/06/24 0718   BP: 137/90  115/81 (!) 148/104   BP Location:   Left arm Left arm   Patient Position:   Lying Lying   Pulse:  67 68 68   Resp:  15  11   Temp:  97.7 °F (36.5 °C) 97.9 °F (36.6 °C) 97.3 °F (36.3 °C)   TempSrc:  Oral Oral Oral   SpO2:  97% 94% 97%   Weight:       Height:           Flowsheet Rows      Flowsheet Row First Filed Value   Admission Height 172.7 cm (68\") Documented at 02/05/2024 1256   Admission Weight 87.7 kg (193 lb 5.5 oz) Documented at 02/05/2024 1256            No intake or output data in the 24 hours ending 02/06/24 0729       Telemetry: Sinus rhythm    Physical Exam:  The patient is alert, oriented and in no distress.  Vital signs as noted above.  Head and neck revealed no carotid bruits or jugular venous distention.  No thyromegaly or lymphadenopathy is present  Lungs clear.  No wheezing.  Breath sounds are normal bilaterally.  Heart normal first and second heart sounds.  No murmur. No precordial rub is present.  No gallop is present.  Abdomen soft and nontender.  No organomegaly is present.  Extremities with good peripheral pulses without any pedal edema.  Skin warm and dry.  Musculoskeletal system is grossly normal.  CNS grossly normal.       Results Review:  I have personally reviewed the results from the time of this admission to 2/6/2024 07:29 EST and agree with these findings:  []  Laboratory  []  Microbiology  []  Radiology  []  " EKG/Telemetry   []  Cardiology/Vascular   []  Pathology  []  Old records  []  Other:    Most notable findings include:    Lab Results (last 24 hours)       Procedure Component Value Units Date/Time    Magnesium [[id number]]  (Abnormal) Collected: 02/06/24 0641    Specimen: Blood Updated: 02/06/24 0718     Magnesium 1.5 mg/dL     Potassium [[id number]]  (Normal) Collected: 02/06/24 0641    Specimen: Blood Updated: 02/06/24 0718     Potassium 3.9 mmol/L     Basic Metabolic Panel [[id number]]  (Abnormal) Collected: 02/06/24 0641    Specimen: Blood Updated: 02/06/24 0718     Glucose 107 mg/dL      BUN 10 mg/dL      Creatinine 0.95 mg/dL      Sodium 141 mmol/L      Potassium 3.9 mmol/L      Chloride 103 mmol/L      CO2 28.0 mmol/L      Calcium 8.7 mg/dL      BUN/Creatinine Ratio 10.5     Anion Gap 10.0 mmol/L      eGFR 96.3 mL/min/1.73     Narrative:      GFR Normal >60  Chronic Kidney Disease <60  Kidney Failure <15      aPTT [[id number]]  (Abnormal) Collected: 02/06/24 0641    Specimen: Blood Updated: 02/06/24 0714     PTT 26.9 seconds     CBC & Differential [[id number]]  (Abnormal) Collected: 02/06/24 0641    Specimen: Blood Updated: 02/06/24 0657    Narrative:      The following orders were created for panel order CBC & Differential.  Procedure                               Abnormality         Status                     ---------                               -----------         ------                     CBC Auto Differential[[id number]]        Abnormal            Final result                 Please view results for these tests on the individual orders.    CBC Auto Differential [[id number]]  (Abnormal) Collected: 02/06/24 0641    Specimen: Blood Updated: 02/06/24 0657     WBC 6.10 10*3/mm3      RBC 4.18 10*6/mm3      Hemoglobin 13.7 g/dL      Hematocrit 40.9 %      MCV 97.8 fL      MCH 32.9 pg      MCHC 33.6 g/dL      RDW 16.9 %      RDW-SD 60.4 fl      MPV 8.0 fL      Platelets 145 10*3/mm3      Neutrophil % 56.1 %       Lymphocyte % 31.5 %      Monocyte % 9.8 %      Eosinophil % 2.1 %      Basophil % 0.5 %      Neutrophils, Absolute 3.40 10*3/mm3      Lymphocytes, Absolute 1.90 10*3/mm3      Monocytes, Absolute 0.60 10*3/mm3      Eosinophils, Absolute 0.10 10*3/mm3      Basophils, Absolute 0.00 10*3/mm3      nRBC 0.2 /100 WBC     POC Activated Clotting Time [[id number]]  (Abnormal) Collected: 02/05/24 1900    Specimen: Arterial Blood Updated: 02/05/24 1929     Activated Clotting Time  255 Seconds      Comment: Serial Number: [id number]:  [id number]       COVID-19, FLU A/B, RSV PCR 1 HR TAT - Swab, Nasopharynx [[id number]]  (Normal) Collected: 02/05/24 1708    Specimen: Swab from Nasopharynx Updated: 02/05/24 1808     COVID19 Not Detected     Influenza A PCR Not Detected     Influenza B PCR Not Detected     RSV, PCR Not Detected    Narrative:      Fact sheet for providers: https://www.fda.gov/media/164344/download    Fact sheet for patients: https://www.fda.gov/media/908469/download    Test performed by PCR.    Basic Metabolic Panel [[id number]]  (Abnormal) Collected: 02/05/24 1409    Specimen: Blood Updated: 02/05/24 1511     Glucose 99 mg/dL      BUN 11 mg/dL      Creatinine 1.07 mg/dL      Sodium 141 mmol/L      Potassium 3.4 mmol/L      Comment: Slight hemolysis detected by analyzer. Result may be falsely elevated.        Chloride 102 mmol/L      CO2 28.0 mmol/L      Calcium 9.2 mg/dL      BUN/Creatinine Ratio 10.3     Anion Gap 11.0 mmol/L      eGFR 83.5 mL/min/1.73     Narrative:      GFR Normal >60  Chronic Kidney Disease <60  Kidney Failure <15      Protime-INR [[id number]]  (Abnormal) Collected: 02/05/24 1409    Specimen: Blood Updated: 02/05/24 1431     Protime 12.1 Seconds      INR 1.12    aPTT [[id number]]  (Abnormal) Collected: 02/05/24 1409    Specimen: Blood Updated: 02/05/24 1431     PTT 25.6 seconds     CBC & Differential [[id number]]  (Abnormal) Collected: 02/05/24 1409    Specimen: Blood Updated: 02/05/24 6818     Narrative:      The following orders were created for panel order CBC & Differential.  Procedure                               Abnormality         Status                     ---------                               -----------         ------                     CBC Auto Differential[[id number]]        Abnormal            Final result                 Please view results for these tests on the individual orders.    CBC Auto Differential [[id number]]  (Abnormal) Collected: 02/05/24 1409    Specimen: Blood Updated: 02/05/24 1422     WBC 6.80 10*3/mm3      RBC 4.30 10*6/mm3      Hemoglobin 14.1 g/dL      Hematocrit 42.7 %      MCV 99.1 fL      MCH 32.7 pg      MCHC 33.0 g/dL      RDW 16.4 %      RDW-SD 56.0 fl      MPV 8.5 fL      Platelets 179 10*3/mm3      Neutrophil % 54.4 %      Lymphocyte % 32.9 %      Monocyte % 9.7 %      Eosinophil % 2.3 %      Basophil % 0.7 %      Neutrophils, Absolute 3.70 10*3/mm3      Lymphocytes, Absolute 2.20 10*3/mm3      Monocytes, Absolute 0.70 10*3/mm3      Eosinophils, Absolute 0.20 10*3/mm3      Basophils, Absolute 0.00 10*3/mm3      nRBC 0.1 /100 WBC             Imaging Results (Last 24 Hours)       Procedure Component Value Units Date/Time    CT Angio Abdominal Aorta Bilateral Iliofem Runoff [[id number]] Collected: 02/05/24 1610     Updated: 02/05/24 1636    Narrative:      CT ANGIO ABDOMINAL AORTA BILAT ILIOFEM RUNOFF    Date of Exam: 2/5/2024 3:06 PM CST    Indication: extensive dvt left leg.    Comparison: Left lower extremity venous Doppler ultrasound 2/5/2024, CT abdomen pelvis 12/27/2010    Technique: CTA of the abdomen, pelvis and both lower extremities was performed before and after the uneventful intravenous administration of 100 cc of Isovue-370. Reconstructed coronal and sagittal images were also obtained. In addition, a 3-D volume   rendered image was created for interpretation. Automated exposure control and iterative reconstruction methods were  used.      Findings:  AORTA:  Normal in caliber throughout. No dissection or penetrating ulcer identified.No significant atherosclerotic disease identified.  MESENTERIC/RENAL VESSELS:  Normal in caliber. No dissection or significant stenosis identified.  PELVIC VESSELS: Normal in caliber. No dissection or significant stenosis identified. There is no evidence of compression of the left common iliac vein by the right common iliac artery.  IVC:  Normal caliber.    RIGHT LOWER EXTREMITY:  Common femoral artery, superficial femoral artery, and popliteal artery are normal in caliber. No significant stenosis.Lower extremity arteries are of normal caliber with three-vessel runoff to the level of the mid calf where the   examination out runs the contrast bolus.    Right lower extremity venous structures are normal in caliber. No intraluminal filling defect/thrombus identified.    LEFT LOWER EXTREMITY:  Common femoral artery, superficial femoral artery, and popliteal artery are normal in caliber. No significant stenosis.Lower extremity arteries are of normal caliber with three-vessel runoff.    Left lower extremity veins are mildly distended with low-density filling defects beginning in the proximal femoral vein and extending through the calf veins, consistent with extensive left lower extremity DVT as seen on same-day ultrasound study. No   extrinsic compressive process is identified to explain patient's DVT.      LOWER THORAX: Unremarkable    LIVER: Unremarkable parenchyma without focal lesion.  BILIARY/GALLBLADDER:  Unremarkable  SPLEEN:  Unremarkable  PANCREAS:  Unremarkable  ADRENAL:  Unremarkable  KIDNEYS: Left kidney is absent. Unremarkable right renal parenchyma with no solid mass identified. No obstruction.  No calculus identified.  GASTROINTESTINAL/MESENTERY:  No evidence of obstruction nor inflammation.      RETROPERITONEUM/LYMPH NODES:  Unremarkable    REPRODUCTIVE:  Unremarkable  BLADDER:  Unremarkable    OSSEUS  STRUCTURES:  Typical for age with no acute process identified.      Impression:      Impression:  1.Left lower extremity DVT from the proximal femoral vein through the calf veins. No extrinsic compressive process upon the left lower extremity venous vasculature is identified to explain findings. No evidence of May-Thurner syndrome.  2.Absent left kidney. Correlate with history.  3.Left lower extremity three-vessel runoff. Distal right calf vessels not visualized due to exam timing. Proximal right calf arteries are widely patent.      Electronically Signed: Donavan Waterman MD    2/5/2024 3:21 PM CST    Workstation ID: [id number]            LAB RESULTS (LAST 7 DAYS)    CBC  Results from last 7 days   Lab Units 02/06/24  0641 02/05/24  1409   WBC 10*3/mm3 6.10 6.80   RBC 10*6/mm3 4.18 4.30   HEMOGLOBIN g/dL 13.7 14.1   HEMATOCRIT % 40.9 42.7   MCV fL 97.8* 99.1*   PLATELETS 10*3/mm3 145 179       BMP  Results from last 7 days   Lab Units 02/06/24  0641 02/05/24  1409   SODIUM mmol/L 141 141   POTASSIUM mmol/L 3.9  3.9 3.4*   CHLORIDE mmol/L 103 102   CO2 mmol/L 28.0 28.0   BUN mg/dL 10 11   CREATININE mg/dL 0.95 1.07   GLUCOSE mg/dL 107* 99   MAGNESIUM mg/dL 1.5*  --        CMP   Results from last 7 days   Lab Units 02/06/24  0641 02/05/24  1409   SODIUM mmol/L 141 141   POTASSIUM mmol/L 3.9  3.9 3.4*   CHLORIDE mmol/L 103 102   CO2 mmol/L 28.0 28.0   BUN mg/dL 10 11   CREATININE mg/dL 0.95 1.07   GLUCOSE mg/dL 107* 99       BNP        TROPONIN        CoAg  Results from last 7 days   Lab Units 02/06/24  0641 02/05/24  1409   INR   --  1.12*   APTT seconds 26.9* 25.6*       Creatinine Clearance  Estimated Creatinine Clearance: 104.2 mL/min (by C-G formula based on SCr of 0.95 mg/dL).    ABG        Radiology  Cardiac Catheterization/Vascular Study    Addendum Date: 2/5/2024    PROCEDURES PERFORMED Ultrasound guided access of the left popliteal vein Selective cath placement in the left popliteal Vein 84157 Selective cath  placement in the left femoral Vein 85702 Selective cath placement in the left iliac Vein  09474 Selective cath placement in the IVC  34050 Venogram of the left popliteal Vein 64731, 36235 Venogram of the left femoral Vein 58441 Venogram of the left the Iliac Vein 95829 Venogram of the IVC 13338 Extirpation of matter from the left iliac vein, left femoral vein and left popliteal vein via mechanical thrombectomy 18600 Intravascular ultrasound noncoronary vessel left iliac vein 70434 Intravascular ultrasound noncoronary vessel, additional vessel, left femoral and popliteal veins.  69235 Transluminal balloon angioplasty of left iliac vein.  08479 Transluminal balloon angioplasty of left femoral vein 93154  PATIENT HISTORY/indication 53-year-old man with longstanding history of leg discomfort presented to the ER with new diagnosis of DVT in the entire venous system of the left leg including left iliac and left femoral veins.  After discussion of risk and benefit of the procedure he was brought to the cardiac Cath Lab for mechanical thrombectomy.  PROCEDURE IN DETAIL After obtaining informed consent, patient was placed in prone position.  Left popliteal area was prepped and draped in standard fashion.  US guidance was used to identify left popliteal vein.  Seldinger technique was used to introduce a micropuncture sheath followed by 7 F sheath.   We first advanced the advantage glide wire to the IVC.  The tract was dilated up and the ClotTriever 13F sheath was placed.  Venograms of the left popliteal vein, left femoral vein, left iliac vein, and IVC were obtained. FINDINGS Ultrasound image of the right popliteal vein demonstrated thrombus and non compressible popliteal vein Venograms: Left popliteal Vein: Large occlusive thrombus throughout the segment. There were tandem 80 % stenosis of mid left iliac vein. Left femoral Vein:Occlusive large thrombus in the left common femoral vein. Left iliac Vein: Partially occlusive  thrombus in the left iliac vein. IVC: Patent IVUS and Balloon angioplasty A peripheral intravascular ultrasound catheter was advanced over the 0.035 advantage Glidewire and measurements were obtained.  A slow manual pullback of IVUS catheter was performed from left iliac to femoral to popliteal vein. IVUS showed common femoral to be 12 mm in average diameter.  Left external iliac had smooth 80% stenosis throughout its length.  Proximal segment of left common femoral vein also had 70% stenosis.  Common iliac measured 15 mm in dimension.  IVUS also confirmed heavy burden of thrombus in the left external iliac vein, left femoral, popliteal veins.  Popliteal vein measured to be 8 x 10 mm in dimension. We then advanced a 12 x 60 mm [place] balloon on a 135 cm shaft and performed serial dilation of left external iliac vein and left femoral vein. Post balloon angioplasty the stenosis reduced to 20% in both femoral and iliac veins. Venous mechanical thrombectomy The [employer] ClotTriever device was then selectively placed.  We performed mechanical thrombectomy in the IVC, left iliac and left common femoral and left popliteal vein.  We selectively placed the ClotTriever catheter into each vein treated.  After 4 passes had been completed, large amount of clot was removed.  Completion venography confirmed complete clot removal and resolution of DVT in the popliteal, femoral, iliac veins.  Wires and sheath removed and manual pressure was applied for 15 minutes to achieve hemostasis.  ESTIMATED BLOOD LOSS 10 cc  DETAILS Contrast:  30 cc Sedation Time: 45 minutes  IMPRESSION Technically successful IVUS guided balloon angioplasty of left external iliac and femoral veins. Extirpation of matter via mechanical thrombectomy of the left lower extremity including left external iliac, femoral and popliteal veins.  PLAN Patient to be admitted to the floor with close observation Continue heparin drip Transition to NOAC tonight. Electronically  signed by Enrique To MD, 02/05/24, 7:39 PM EST.     CT Angio Abdominal Aorta Bilateral Iliofem Runoff    Result Date: 2/5/2024  Impression: 1.Left lower extremity DVT from the proximal femoral vein through the calf veins. No extrinsic compressive process upon the left lower extremity venous vasculature is identified to explain findings. No evidence of May-Thurner syndrome. 2.Absent left kidney. Correlate with history. 3.Left lower extremity three-vessel runoff. Distal right calf vessels not visualized due to exam timing. Proximal right calf arteries are widely patent. Electronically Signed: Donavan Waterman MD  2/5/2024 3:21 PM CST  Workstation ID: [id number]       EKG  I personally viewed and interpreted the patient's EKG/Telemetry data:  SCANNED - TELEMETRY     Final Result      SCANNED - TELEMETRY     Final Result            Echocardiogram:    Results for orders placed during the hospital encounter of 08/14/23    Adult Transthoracic Echo Complete w/ Color, Spectral and Contrast if Necessary Per Protocol    Interpretation Summary    Left ventricular ejection fraction appears to be 56 - 60%.    Estimated right ventricular systolic pressure from tricuspid regurgitation is normal (<35 mmHg).    Indications  Syncope    Technically satisfactory study.  Mitral valve is structurally normal.  Tricuspid valve is structurally normal.  Aortic valve is structurally normal.  Pulmonic valve could not be well visualized.  No evidence for mitral tricuspid or aortic regurgitation is seen by Doppler study.  Left atrium is enlarged.  Right atrium is normal in size.  Left ventricle is normal in size and contractility with ejection fraction of 60%.  Diastolic dysfunction.  Right ventricle is normal in size.  Atrial septum is intact.  Aorta is normal.  No pericardial effusion or intracardiac thrombus is seen.    Impression  Structurally and functionally normal cardiac valves.  Left ventricular size and contractility is normal with  ejection fraction of 60%.  Diastolic dysfunction.  Mild left atrial enlargement.        Stress Test:         Cardiac Catheterization:  Results for orders placed during the hospital encounter of 02/05/24    Cardiac Catheterization/Vascular Study    Narrative  PROCEDURES PERFORMED  Ultrasound guided access of the left popliteal vein  Selective cath placement in the left popliteal Vein 64788  Selective cath placement in the left femoral Vein 10367  Selective cath placement in the left iliac Vein  10232  Selective cath placement in the IVC  39939  Venogram of the left popliteal Vein 42646, 98453  Venogram of the left femoral Vein 37874  Venogram of the left the Iliac Vein 25726  Venogram of the IVC 26268  Extirpation of matter from the left iliac vein, left femoral vein and left popliteal vein via mechanical thrombectomy 14095  Intravascular ultrasound noncoronary vessel left iliac vein 09027  Intravascular ultrasound noncoronary vessel, additional vessel, left femoral and popliteal veins.  58739  Transluminal balloon angioplasty of left iliac vein.  67158  Transluminal balloon angioplasty of left femoral vein 37132      PATIENT HISTORY/indication  53-year-old man with longstanding history of leg discomfort presented to the ER with new diagnosis of DVT in the entire venous system of the left leg including left iliac and left femoral veins.  After discussion of risk and benefit of the procedure he was brought to the cardiac Cath Lab for mechanical thrombectomy.      PROCEDURE IN DETAIL  After obtaining informed consent, patient was placed in prone position.  Left popliteal area was prepped and draped in standard fashion.  US guidance was used to identify left popliteal vein.  Seldinger technique was used to introduce a micropuncture sheath followed by 7 F sheath.   We first advanced the advantage glide wire to the IVC.  The tract was dilated up and the ClotTriever 13F sheath was placed.  Venograms of the left popliteal  vein, left femoral vein, left iliac vein, and IVC were obtained.    FINDINGS  Ultrasound image of the right popliteal vein demonstrated thrombus and non compressible popliteal vein  Venograms:  Left popliteal Vein: Large occlusive thrombus throughout the segment. There were tandem 80 % stenosis of mid left iliac vein.  Left femoral Vein:Occlusive large thrombus in the left common femoral vein.  Left iliac Vein: Partially occlusive thrombus in the left iliac vein.  IVC: Patent    IVUS and Balloon angioplasty  A peripheral intravascular ultrasound catheter was advanced over the 0.035 advantage Glidewire and measurements were obtained.  A slow manual pullback of IVUS catheter was performed from left iliac to femoral to popliteal vein.    IVUS showed common femoral to be 12 mm in average diameter.  Left external iliac had smooth 80% stenosis throughout its length.  Proximal segment of left common femoral vein also had 70% stenosis.  Common iliac measured 15 mm in dimension.  IVUS also confirmed heavy burden of thrombus in the left external iliac vein, left femoral, popliteal veins.  Popliteal vein measured to be 8 x 10 mm in dimension.    We then advanced a 12 x 60 mm [place] balloon on a 135 cm shaft and performed serial dilation of left external iliac vein and left femoral vein.  Post balloon angioplasty the stenosis reduced to 20% in both femoral and iliac veins.    Venous mechanical thrombectomy  The Inari ClotTriever device was then selectively placed.  We performed mechanical thrombectomy in the IVC, left iliac and left common femoral and left popliteal vein.  We selectively placed the ClotTriever catheter into each vein treated.  After 4 passes had been completed, large amount of clot was removed.  Completion venography confirmed complete clot removal and resolution of DVT in the popliteal, femoral, iliac veins.    Wires and sheath removed and manual pressure was applied for 15 minutes to achieve  hemostasis.    ESTIMATED BLOOD LOSS  10 cc    DETAILS  Contrast:  30 cc  Sedation Time: 45 minutes    IMPRESSION  Technically successful IVUS guided balloon angioplasty of left external iliac and femoral veins.  Extirpation of matter via mechanical thrombectomy of the left lower extremity including left external iliac, femoral and popliteal veins.    PLAN  Patient to be admitted to the floor with close observation  Continue heparin drip  Transition to NOAC tonight.    Electronically signed by Enrique To MD, 02/05/24, 7:39 PM EST.         Other:         ASSESSMENT & PLAN:    Principal Problem:    DVT (deep venous thrombosis)    DVT  Extensive DVT of the left leg  Doppler ultrasound and CT abdomen suggestive of proximal femoral and iliac vein involvement.  Patient is unable to bear weight due to significant pain/discomfort.  I discussed possibility of medical management only with anticoagulation versus combination of mechanical thrombectomy and anticoagulation.  He is status post mechanical thrombectomy on 2/5/2024  Heparin drip has been stopped  Started on loading dose of Eliquis  Continue Eliquis 5 mg p.o. twice daily for 6 months  Encouraged ambulation     Hypertension  Better controlled now  Continue home medications amlodipine and clonidine, hydrochlorothiazide     Solitary kidney  Patient was born with a solitary kidney  Closely monitor renal function as he received contrast during CT scan.   Creatinine 0.95, GFR is 96.3   Potassium has been replaced now 3.9     Hyperlipidemia  Continue statin  Obtain a lipid panel     Gout  Continue allopurinol     Overweight  BMI is 27.15  Dietary changes and lifestyle modifications discussed with the patient.  Screening and treatment for sleep apnea also suggested  A previous echocardiogram showed preserved LV function and no significant valvular abnormalities in August 2023.    Okay to be discharged from cardiac and DVT standpoint.  Follow-up in the office in 4  weeks.      Enrique To MD  02/06/24  07:29 EST

## 2024-02-06 NOTE — DISCHARGE SUMMARY
[facility]         DISCHARGE SUMMARY    Patient Name: Spencer Brothers  : 1970  MRN: [id number]    Date of Admission: 2024  Date of Discharge:  2024  Primary Care Physician: Kaleigh Strickland APRN    Consults       Date and Time Order Name Status Description    2024  3:35 PM Hospitalist (on-call MD unless specified)      2024  3:29 PM Cardiology (on-call MD unless specified) Completed             Presenting Problem:   DVT (deep venous thrombosis) [I82.409]  Acute deep vein thrombosis (DVT) of proximal vein of left lower extremity [I82.4Y2]    Active and Resolved Hospital Problems:  Active Hospital Problems    Diagnosis POA    **DVT (deep venous thrombosis) [I82.409] Yes      Resolved Hospital Problems   No resolved problems to display.         Hospital Course     Hospital Course:  Spencer Brothers is a 53 y.o. male with past medical history of hypertension, hyperlipidemia , who admitted to hospital for left lower extremity DVT, cardiology was consulted and patient had thrombectomy on 24.  Initially was started on heparin drip and switch to Eliquis.  Patient was cleared by cardiology to be discharged home and recommended to continue Eliquis for 6-month.  This point patient received maximum benefit from this hospitalization was safe to discharge home to follow-up with his PCP and other specialist        DISCHARGE Follow Up Recommendations for labs and diagnostics:   -Follow-up with your PCP in 1 week  -Follow-up with cardiology in 2 weeks      Day of Discharge     Vital Signs:  Temp:  [97.3 °F (36.3 °C)-98.3 °F (36.8 °C)] 97.3 °F (36.3 °C)  Heart Rate:  [] 80  Resp:  [11-18] 14  BP: (115-187)/() 152/98  Physical Exam:  Constitutional: Awake, alert   Eyes: PERRLA, sclerae anicteric, no conjunctival injection   HENT: NCAT, mucous membranes moist   Neck: Supple, no thyromegaly, no lymphadenopathy, trachea midline   Respiratory: Clear to auscultation bilaterally,  nonlabored respirations    Cardiovascular: RRR, no murmurs, rubs, or gallops, palpable pedal pulses bilaterally   Gastrointestinal: Positive bowel sounds, soft, nontender, nondistended   Musculoskeletal: No bilateral ankle edema, no clubbing or cyanosis to extremities   Psychiatric: Appropriate affect, cooperative   Neurologic: Oriented x 3, strength symmetric in all extremities, Cranial Nerves grossly intact to confrontation, speech clear   Skin: No rashes     Pertinent  and/or Most Recent Results     LAB RESULTS:      Lab 02/06/24  0641 02/05/24  1409   WBC 6.10 6.80   HEMOGLOBIN 13.7 14.1   HEMATOCRIT 40.9 42.7   PLATELETS 145 179   NEUTROS ABS 3.40 3.70   LYMPHS ABS 1.90 2.20   MONOS ABS 0.60 0.70   EOS ABS 0.10 0.20   MCV 97.8* 99.1*   PROTIME  --  12.1*   APTT 26.9* 25.6*         Lab 02/06/24  0641 02/05/24  1409   SODIUM 141 141   POTASSIUM 3.9  3.9 3.4*   CHLORIDE 103 102   CO2 28.0 28.0   ANION GAP 10.0 11.0   BUN 10 11   CREATININE 0.95 1.07   EGFR 96.3 83.5   GLUCOSE 107* 99   CALCIUM 8.7 9.2   MAGNESIUM 1.5*  --              Lab 02/05/24  1409   PROTIME 12.1*   INR 1.12*         Lab 02/06/24  0641   CHOLESTEROL 174   LDL CHOL 53   HDL CHOL 24*   TRIGLYCERIDES 661*             Brief Urine Lab Results  (Last result in the past 365 days)        Color   Clarity   Blood   Leuk Est   Nitrite   Protein   CREAT   Urine HCG        08/14/23 0758 Yellow   Clear   Moderate (2+)   Negative   Negative   100 mg/dL (2+)                 Microbiology Results (last 10 days)       Procedure Component Value - Date/Time    COVID-19, FLU A/B, RSV PCR 1 HR TAT - Swab, Nasopharynx [[id number]]  (Normal) Collected: 02/05/24 1708    Lab Status: Final result Specimen: Swab from Nasopharynx Updated: 02/05/24 1808     COVID19 Not Detected     Influenza A PCR Not Detected     Influenza B PCR Not Detected     RSV, PCR Not Detected    Narrative:      Fact sheet for providers: https://www.fda.gov/media/877457/download    Fact sheet for  patients: https://www.[employer].gov/media/987175/download    Test performed by [facility].            Cardiac Catheterization/Vascular Study    Addendum Date: 2/5/2024    PROCEDURES PERFORMED Ultrasound guided access of the left popliteal vein Selective cath placement in the left popliteal Vein 57265 Selective cath placement in the left femoral Vein 43739 Selective cath placement in the left iliac Vein  26275 Selective cath placement in the IVC  52159 Venogram of the left popliteal Vein 47258, 95049 Venogram of the left femoral Vein 05632 Venogram of the left the Iliac Vein 01905 Venogram of the IVC 21244 Extirpation of matter from the left iliac vein, left femoral vein and left popliteal vein via mechanical thrombectomy 26467 Intravascular ultrasound noncoronary vessel left iliac vein 43572 Intravascular ultrasound noncoronary vessel, additional vessel, left femoral and popliteal veins.  18854 Transluminal balloon angioplasty of left iliac vein.  42105 Transluminal balloon angioplasty of left femoral vein 10443  PATIENT HISTORY/indication 53-year-old man with longstanding history of leg discomfort presented to the ER with new diagnosis of DVT in the entire venous system of the left leg including left iliac and left femoral veins.  After discussion of risk and benefit of the procedure he was brought to the cardiac Cath Lab for mechanical thrombectomy.  PROCEDURE IN DETAIL After obtaining informed consent, patient was placed in prone position.  Left popliteal area was prepped and draped in standard fashion.  US guidance was used to identify left popliteal vein.  Seldinger technique was used to introduce a micropuncture sheath followed by 7 F sheath.   We first advanced the advantage glide wire to the IVC.  The tract was dilated up and the ClotTriever 13F sheath was placed.  Venograms of the left popliteal vein, left femoral vein, left iliac vein, and IVC were obtained. FINDINGS Ultrasound image of the right popliteal vein  demonstrated thrombus and non compressible popliteal vein Venograms: Left popliteal Vein: Large occlusive thrombus throughout the segment. There were tandem 80 % stenosis of mid left iliac vein. Left femoral Vein:Occlusive large thrombus in the left common femoral vein. Left iliac Vein: Partially occlusive thrombus in the left iliac vein. IVC: Patent IVUS and Balloon angioplasty A peripheral intravascular ultrasound catheter was advanced over the 0.035 advantage Glidewire and measurements were obtained.  A slow manual pullback of IVUS catheter was performed from left iliac to femoral to popliteal vein. IVUS showed common femoral to be 12 mm in average diameter.  Left external iliac had smooth 80% stenosis throughout its length.  Proximal segment of left common femoral vein also had 70% stenosis.  Common iliac measured 15 mm in dimension.  IVUS also confirmed heavy burden of thrombus in the left external iliac vein, left femoral, popliteal veins.  Popliteal vein measured to be 8 x 10 mm in dimension. We then advanced a 12 x 60 mm [place] balloon on a 135 cm shaft and performed serial dilation of left external iliac vein and left femoral vein. Post balloon angioplasty the stenosis reduced to 20% in both femoral and iliac veins. Venous mechanical thrombectomy The Inari ClotTriever device was then selectively placed.  We performed mechanical thrombectomy in the IVC, left iliac and left common femoral and left popliteal vein.  We selectively placed the ClotTriever catheter into each vein treated.  After 4 passes had been completed, large amount of clot was removed.  Completion venography confirmed complete clot removal and resolution of DVT in the popliteal, femoral, iliac veins.  Wires and sheath removed and manual pressure was applied for 15 minutes to achieve hemostasis.  ESTIMATED BLOOD LOSS 10 cc  DETAILS Contrast:  30 cc Sedation Time: 45 minutes  IMPRESSION Technically successful IVUS guided balloon angioplasty of  left external iliac and femoral veins. Extirpation of matter via mechanical thrombectomy of the left lower extremity including left external iliac, femoral and popliteal veins.  PLAN Patient to be admitted to the floor with close observation Continue heparin drip Transition to NOAC tonight. Electronically signed by Enrique To MD, 02/05/24, 7:39 PM EST.     CT Angio Abdominal Aorta Bilateral Iliofem Runoff    Result Date: 2/5/2024  Impression: Impression: 1.Left lower extremity DVT from the proximal femoral vein through the calf veins. No extrinsic compressive process upon the left lower extremity venous vasculature is identified to explain findings. No evidence of May-Thurner syndrome. 2.Absent left kidney. Correlate with history. 3.Left lower extremity three-vessel runoff. Distal right calf vessels not visualized due to exam timing. Proximal right calf arteries are widely patent. Electronically Signed: Donavan Waterman MD  2/5/2024 3:21 PM CST  Workstation ID: [id number]             Results for orders placed during the hospital encounter of 08/14/23    Adult Transthoracic Echo Complete w/ Color, Spectral and Contrast if Necessary Per Protocol    Interpretation Summary    Left ventricular ejection fraction appears to be 56 - 60%.    Estimated right ventricular systolic pressure from tricuspid regurgitation is normal (<35 mmHg).    Indications  Syncope    Technically satisfactory study.  Mitral valve is structurally normal.  Tricuspid valve is structurally normal.  Aortic valve is structurally normal.  Pulmonic valve could not be well visualized.  No evidence for mitral tricuspid or aortic regurgitation is seen by Doppler study.  Left atrium is enlarged.  Right atrium is normal in size.  Left ventricle is normal in size and contractility with ejection fraction of 60%.  Diastolic dysfunction.  Right ventricle is normal in size.  Atrial septum is intact.  Aorta is normal.  No pericardial effusion or intracardiac thrombus  is seen.    Impression  Structurally and functionally normal cardiac valves.  Left ventricular size and contractility is normal with ejection fraction of 60%.  Diastolic dysfunction.  Mild left atrial enlargement.      Labs Pending at Discharge:        Discharge Details        Discharge Medications        New Medications        Instructions Start Date   apixaban 5 MG tablet tablet  Commonly known as: ELIQUIS   Take 2 tablets by mouth 2 (Two) Times a Day for 6 days, THEN 1 tablet 2 (Two) Times a Day for 30 days. Indications: DVT/PE (active thrombosis)   Start Date: February 6, 2024     HYDROcodone-acetaminophen 5-325 MG per tablet  Commonly known as: NORCO   1 tablet, Oral, Every 8 Hours PRN             Continue These Medications        Instructions Start Date   acetaminophen 325 MG tablet  Commonly known as: TYLENOL   650 mg, Oral, Every 6 Hours PRN      allopurinol 300 MG tablet  Commonly known as: ZYLOPRIM   300 mg, Oral, Daily      cloNIDine 0.2 MG tablet  Commonly known as: CATAPRES   0.2 mg, Oral, 2 Times Daily      diclofenac 50 MG EC tablet  Commonly known as: VOLTAREN   50 mg, Oral, 2 Times Daily PRN      divalproex 500 MG 24 hr tablet  Commonly known as: DEPAKOTE ER   500 mg, Oral, 2 Times Daily      hydrALAZINE 100 MG tablet  Commonly known as: APRESOLINE   100 mg, Oral, 3 Times Daily      hydroCHLOROthiazide 25 MG tablet  Commonly known as: HYDRODIURIL   25 mg, Oral, Daily      lovastatin 20 MG tablet  Commonly known as: MEVACOR   20 mg, Oral, Nightly      nadolol 40 MG tablet  Commonly known as: CORGARD   40 mg, Oral, Daily      omeprazole 20 MG capsule  Commonly known as: priLOSEC   20 mg, Oral, 2 Times Daily      tiZANidine 4 MG tablet  Commonly known as: ZANAFLEX   4 mg, Oral, 3 Times Daily PRN      VITAMIN B 12 PO   1 tablet, Oral, Daily      VITAMIN D-3 PO   1 capsule, Oral, Daily      zolpidem 10 MG tablet  Commonly known as: AMBIEN   10 mg, Oral, Nightly PRN             Stop These Medications       celecoxib 200 MG capsule  Commonly known as: CeleBREX     Testosterone Cypionate 200 MG/ML solution              Allergies   Allergen Reactions    Lisinopril Angioedema         Discharge Disposition:   Home or Self Care    Discharge Condition: .cond    Diet:  Hospital:  Diet Order   Procedures    Diet: Regular/House Diet; Texture: Regular Texture (IDDSI 7); Fluid Consistency: Thin (IDDSI 0)         Discharge Activity:         CODE STATUS:  Code Status and Medical Interventions:   Ordered at: 02/05/24 1930     Level Of Support Discussed With:    Patient     Code Status (Patient has no pulse and is not breathing):    CPR (Attempt to Resuscitate)     Medical Interventions (Patient has pulse or is breathing):    Full Support         Future Appointments   Date Time Provider Department Center   2/19/2024  1:30 PM Enrique To MD [facility]       Additional Instructions for the Follow-ups that You Need to Schedule       Ambulatory Referral to Hematology / Oncology   As directed      Discharge Follow-up with PCP   As directed       Currently Documented PCP:    Kaleigh Strickland APRN    PCP Phone Number:    [phone]     Follow Up Details: 1 week        Discharge Follow-up with Specified Provider: Cardiology; 2 Weeks   As directed      To: Cardiology   Follow Up: 2 Weeks                Time spent on Discharge including face to face service: Less than 30 minutes    Sophia Cheng MD

## 2024-02-06 NOTE — DISCHARGE INSTRUCTIONS
Measure your blood pressure daily and keep a log to bring to your follow-up appointment with cardiology.

## 2024-02-06 NOTE — PLAN OF CARE
Problem: Adult Inpatient Plan of Care  Goal: Absence of Hospital-Acquired Illness or Injury  Intervention: Identify and Manage Fall Risk  Recent Flowsheet Documentation  Taken 2/6/2024 0200 by Maxi Chandra RN  Safety Promotion/Fall Prevention:   assistive device/personal items within reach   clutter free environment maintained   fall prevention program maintained   nonskid shoes/slippers when out of bed   room organization consistent   safety round/check completed  Taken 2/6/2024 0000 by Maxi Chandra RN  Safety Promotion/Fall Prevention:   assistive device/personal items within reach   clutter free environment maintained   nonskid shoes/slippers when out of bed   room organization consistent   safety round/check completed  Taken 2/5/2024 2200 by Maxi Chandra RN  Safety Promotion/Fall Prevention:   assistive device/personal items within reach   clutter free environment maintained   fall prevention program maintained   nonskid shoes/slippers when out of bed   room organization consistent   safety round/check completed  Intervention: Prevent Skin Injury  Recent Flowsheet Documentation  Taken 2/6/2024 0000 by Maxi Chandra RN  Body Position: position changed independently  Skin Protection:   adhesive use limited   tubing/devices free from skin contact  Intervention: Prevent Infection  Recent Flowsheet Documentation  Taken 2/6/2024 0200 by Maxi Chandra RN  Infection Prevention:   environmental surveillance performed   hand hygiene promoted   personal protective equipment utilized   rest/sleep promoted   single patient room provided  Taken 2/6/2024 0000 by Maxi Chandra RN  Infection Prevention: environmental surveillance performed  Taken 2/5/2024 2200 by Maxi Chandra RN  Infection Prevention:   environmental surveillance performed   hand hygiene promoted   personal protective equipment utilized   rest/sleep promoted   single patient room provided     Problem: Adult Inpatient Plan of  Care  Goal: Absence of Hospital-Acquired Illness or Injury  Intervention: Prevent Skin Injury  Recent Flowsheet Documentation  Taken 2/6/2024 0000 by Maxi Chandra RN  Body Position: position changed independently  Skin Protection:   adhesive use limited   tubing/devices free from skin contact     Problem: Adult Inpatient Plan of Care  Goal: Absence of Hospital-Acquired Illness or Injury  Intervention: Prevent Infection  Recent Flowsheet Documentation  Taken 2/6/2024 0200 by Maxi Chandra RN  Infection Prevention:   environmental surveillance performed   hand hygiene promoted   personal protective equipment utilized   rest/sleep promoted   single patient room provided  Taken 2/6/2024 0000 by Maxi Chandra RN  Infection Prevention: environmental surveillance performed  Taken 2/5/2024 2200 by Maxi Chandra RN  Infection Prevention:   environmental surveillance performed   hand hygiene promoted   personal protective equipment utilized   rest/sleep promoted   single patient room provided     Problem: Hypertension Comorbidity  Goal: Blood Pressure in Desired Range  Intervention: Maintain Blood Pressure Management  Recent Flowsheet Documentation  Taken 2/6/2024 0000 by Maxi Chandra RN  Syncope Management: position changed slowly  Medication Review/Management: medications reviewed  Taken 2/5/2024 2200 by Maxi Chandra RN  Medication Review/Management: medications reviewed     Problem: Fall Injury Risk  Goal: Absence of Fall and Fall-Related Injury  Intervention: Identify and Manage Contributors  Recent Flowsheet Documentation  Taken 2/6/2024 0000 by Maxi Chandra RN  Medication Review/Management: medications reviewed  Taken 2/5/2024 2200 by Maxi Chandra RN  Medication Review/Management: medications reviewed    Problem: Fall Injury Risk  Goal: Absence of Fall and Fall-Related Injury  Intervention: Promote Injury-Free Environment  Recent Flowsheet Documentation  Taken 2/6/2024 0200 by  Maxi Chandra, RN  Safety Promotion/Fall Prevention:   assistive device/personal items within reach   clutter free environment maintained   fall prevention program maintained   nonskid shoes/slippers when out of bed   room organization consistent   safety round/check completed  Taken 2/6/2024 0000 by Maxi Chandra RN  Safety Promotion/Fall Prevention:   assistive device/personal items within reach   clutter free environment maintained   nonskid shoes/slippers when out of bed   room organization consistent   safety round/check completed  Taken 2/5/2024 2200 by Maxi Chandra RN  Safety Promotion/Fall Prevention:   assistive device/personal items within reach   clutter free environment maintained   fall prevention program maintained   nonskid shoes/slippers when out of bed   room organization consistent   safety round/check completed     I Goal Outcome Evaluation:      Patient was on a heparin drip upon admission to the unit. Patient's drip was stopped one hour after his Eliquis was administered.Patient was given Ambien before bed. Patient has NORCO ordered for every six hours as needed. Patient has been calm and cooperative with care.

## 2024-02-07 NOTE — PAYOR COMM NOTE
"  DISCHARGE NOTICE --  [id number]       This patient discharged  .HOME on 2/6/24.  Please advise if additional information is needed to finalize this request.    Thank you!      Tammi Glynn  Utilization Review Coordinator  [facility]  [address] [place]  [address]  Ph: [phone]  Fx: [phone]        StarruccaSpencer (53 y.o. Male)       Date of Birth   1970    Social Security Number       Address   [address] [facility] [place] [address]    Home Phone   [phone]    MRN   [id number]       Adventism   None    Marital Status                               Admission Date   2/5/24    Admission Type   Emergency    Admitting Provider   Jay Fuentes MD    Attending Provider       Department, Room/Bed   [facility] 2D, 256/1       Discharge Date   2/6/2024    Discharge Disposition   Home or Self Care    Discharge Destination                                 Attending Provider: (none)   Allergies: Lisinopril    Isolation: None   Infection: None   Code Status: Prior    Ht: 172.7 cm (68\")   Wt: 80.7 kg (178 lb)    Admission Cmt: None   Principal Problem: DVT (deep venous thrombosis) [I82.409]                   Active Insurance as of 2/5/2024       Primary Coverage       Payor Plan Insurance Group Employer/Plan Group    [id number]       Payor Plan Address Payor Plan Phone Number Payor Plan Fax Number Effective Dates    MAIL STOP:   8/1/2023 - None Entered    [address] [address]       [facility] [address]         Subscriber Name Subscriber Birth Date Member ID       SPENCER FUNES 1970 [id number]                     Emergency Contacts        (Rel.) Home Phone Work Phone Mobile Phone    MikVane (Spouse) [phone] -- [phone]          "

## 2024-02-16 ENCOUNTER — HOSPITAL ENCOUNTER (OUTPATIENT)
Facility: HOSPITAL | Age: 54
Setting detail: OBSERVATION
Discharge: HOME | End: 2024-02-17
Attending: EMERGENCY MEDICINE | Admitting: EMERGENCY MEDICINE
Payer: MEDICAID

## 2024-02-16 ENCOUNTER — APPOINTMENT (OUTPATIENT)
Dept: CT IMAGING | Facility: HOSPITAL | Age: 54
End: 2024-02-16
Payer: MEDICAID

## 2024-02-16 ENCOUNTER — TELEPHONE (OUTPATIENT)
Dept: CARDIOLOGY | Facility: CLINIC | Age: 54
End: 2024-02-16
Payer: MEDICAID

## 2024-02-16 DIAGNOSIS — Z82.49 FAMILY HISTORY OF PREMATURE CAD: ICD-10-CM

## 2024-02-16 DIAGNOSIS — E78.2 MIXED HYPERLIPIDEMIA: ICD-10-CM

## 2024-02-16 DIAGNOSIS — I51.89 GRADE I DIASTOLIC DYSFUNCTION: ICD-10-CM

## 2024-02-16 DIAGNOSIS — R07.9 ACUTE CHEST PAIN: Primary | ICD-10-CM

## 2024-02-16 DIAGNOSIS — I10 ESSENTIAL HYPERTENSION: ICD-10-CM

## 2024-02-16 LAB
ALBUMIN SERPL-MCNC: 4.7 G/DL (ref 3.5–5.2)
ALBUMIN/GLOB SERPL: 1.5 G/DL
ALP SERPL-CCNC: 71 U/L (ref 39–117)
ALT SERPL W P-5'-P-CCNC: 49 U/L (ref 1–41)
ANION GAP SERPL CALCULATED.3IONS-SCNC: 10 MMOL/L (ref 5–15)
APTT PPP: 30.8 SECONDS (ref 61–76.5)
AST SERPL-CCNC: 44 U/L (ref 1–40)
BASOPHILS # BLD AUTO: 0.1 10*3/MM3 (ref 0–0.2)
BASOPHILS NFR BLD AUTO: 0.7 % (ref 0–1.5)
BILIRUB SERPL-MCNC: 0.6 MG/DL (ref 0–1.2)
BUN SERPL-MCNC: 12 MG/DL (ref 6–20)
BUN/CREAT SERPL: 9 (ref 7–25)
CALCIUM SPEC-SCNC: 10 MG/DL (ref 8.6–10.5)
CHLORIDE SERPL-SCNC: 97 MMOL/L (ref 98–107)
CO2 SERPL-SCNC: 32 MMOL/L (ref 22–29)
CREAT SERPL-MCNC: 1.33 MG/DL (ref 0.76–1.27)
DEPRECATED RDW RBC AUTO: 55.1 FL (ref 37–54)
EGFRCR SERPLBLD CKD-EPI 2021: 64.3 ML/MIN/1.73
EOSINOPHIL # BLD AUTO: 0.1 10*3/MM3 (ref 0–0.4)
EOSINOPHIL NFR BLD AUTO: 1.7 % (ref 0.3–6.2)
ERYTHROCYTE [DISTWIDTH] IN BLOOD BY AUTOMATED COUNT: 15.5 % (ref 12.3–15.4)
FLUAV SUBTYP SPEC NAA+PROBE: NOT DETECTED
FLUBV RNA ISLT QL NAA+PROBE: NOT DETECTED
GLOBULIN UR ELPH-MCNC: 3.2 GM/DL
GLUCOSE SERPL-MCNC: 89 MG/DL (ref 65–99)
HCT VFR BLD AUTO: 48.6 % (ref 37.5–51)
HGB BLD-MCNC: 16.2 G/DL (ref 13–17.7)
HOLD SPECIMEN: NORMAL
INR PPP: 1.16 (ref 0.93–1.1)
LYMPHOCYTES # BLD AUTO: 2.9 10*3/MM3 (ref 0.7–3.1)
LYMPHOCYTES NFR BLD AUTO: 33.1 % (ref 19.6–45.3)
MCH RBC QN AUTO: 32.7 PG (ref 26.6–33)
MCHC RBC AUTO-ENTMCNC: 33.3 G/DL (ref 31.5–35.7)
MCV RBC AUTO: 98.3 FL (ref 79–97)
MONOCYTES # BLD AUTO: 1 10*3/MM3 (ref 0.1–0.9)
MONOCYTES NFR BLD AUTO: 11.5 % (ref 5–12)
NEUTROPHILS NFR BLD AUTO: 4.6 10*3/MM3 (ref 1.7–7)
NEUTROPHILS NFR BLD AUTO: 53 % (ref 42.7–76)
NRBC BLD AUTO-RTO: 0.1 /100 WBC (ref 0–0.2)
PLATELET # BLD AUTO: 244 10*3/MM3 (ref 140–450)
PMV BLD AUTO: 8.7 FL (ref 6–12)
POTASSIUM SERPL-SCNC: 3.6 MMOL/L (ref 3.5–5.2)
PROT SERPL-MCNC: 7.9 G/DL (ref 6–8.5)
PROTHROMBIN TIME: 12.5 SECONDS (ref 9.6–11.7)
QT INTERVAL: 409 MS
QTC INTERVAL: 453 MS
RBC # BLD AUTO: 4.94 10*6/MM3 (ref 4.14–5.8)
SARS-COV-2 RNA RESP QL NAA+PROBE: NOT DETECTED
SODIUM SERPL-SCNC: 139 MMOL/L (ref 136–145)
TROPONIN T SERPL HS-MCNC: 13 NG/L
WBC NRBC COR # BLD AUTO: 8.7 10*3/MM3 (ref 3.4–10.8)

## 2024-02-16 PROCEDURE — 99285 EMERGENCY DEPT VISIT HI MDM: CPT

## 2024-02-16 PROCEDURE — G0378 HOSPITAL OBSERVATION PER HR: HCPCS

## 2024-02-16 PROCEDURE — 36415 COLL VENOUS BLD VENIPUNCTURE: CPT

## 2024-02-16 PROCEDURE — 71275 CT ANGIOGRAPHY CHEST: CPT

## 2024-02-16 PROCEDURE — 96374 THER/PROPH/DIAG INJ IV PUSH: CPT

## 2024-02-16 PROCEDURE — 93005 ELECTROCARDIOGRAM TRACING: CPT | Performed by: EMERGENCY MEDICINE

## 2024-02-16 PROCEDURE — 93005 ELECTROCARDIOGRAM TRACING: CPT

## 2024-02-16 PROCEDURE — 85025 COMPLETE CBC W/AUTO DIFF WBC: CPT | Performed by: EMERGENCY MEDICINE

## 2024-02-16 PROCEDURE — 80053 COMPREHEN METABOLIC PANEL: CPT | Performed by: EMERGENCY MEDICINE

## 2024-02-16 PROCEDURE — 87636 SARSCOV2 & INF A&B AMP PRB: CPT | Performed by: EMERGENCY MEDICINE

## 2024-02-16 PROCEDURE — 84484 ASSAY OF TROPONIN QUANT: CPT | Performed by: EMERGENCY MEDICINE

## 2024-02-16 PROCEDURE — 85730 THROMBOPLASTIN TIME PARTIAL: CPT | Performed by: EMERGENCY MEDICINE

## 2024-02-16 PROCEDURE — 85610 PROTHROMBIN TIME: CPT | Performed by: EMERGENCY MEDICINE

## 2024-02-16 PROCEDURE — 25510000001 IOPAMIDOL PER 1 ML: Performed by: EMERGENCY MEDICINE

## 2024-02-16 PROCEDURE — 96376 TX/PRO/DX INJ SAME DRUG ADON: CPT

## 2024-02-16 RX ORDER — ALLOPURINOL 300 MG/1
300 TABLET ORAL DAILY
Status: DISCONTINUED | OUTPATIENT
Start: 2024-02-17 | End: 2024-02-17 | Stop reason: HOSPADM

## 2024-02-16 RX ORDER — BISACODYL 10 MG
10 SUPPOSITORY, RECTAL RECTAL DAILY PRN
Status: DISCONTINUED | OUTPATIENT
Start: 2024-02-16 | End: 2024-02-17 | Stop reason: HOSPADM

## 2024-02-16 RX ORDER — CLONIDINE HYDROCHLORIDE 0.1 MG/1
0.2 TABLET ORAL 2 TIMES DAILY
Status: DISCONTINUED | OUTPATIENT
Start: 2024-02-17 | End: 2024-02-17 | Stop reason: HOSPADM

## 2024-02-16 RX ORDER — POLYETHYLENE GLYCOL 3350 17 G/17G
17 POWDER, FOR SOLUTION ORAL DAILY PRN
Status: DISCONTINUED | OUTPATIENT
Start: 2024-02-16 | End: 2024-02-17 | Stop reason: HOSPADM

## 2024-02-16 RX ORDER — AMOXICILLIN 250 MG
2 CAPSULE ORAL 2 TIMES DAILY
Status: DISCONTINUED | OUTPATIENT
Start: 2024-02-16 | End: 2024-02-17 | Stop reason: HOSPADM

## 2024-02-16 RX ORDER — SODIUM CHLORIDE 0.9 % (FLUSH) 0.9 %
10 SYRINGE (ML) INJECTION AS NEEDED
Status: DISCONTINUED | OUTPATIENT
Start: 2024-02-16 | End: 2024-02-17 | Stop reason: HOSPADM

## 2024-02-16 RX ORDER — HYDRALAZINE HYDROCHLORIDE 25 MG/1
100 TABLET, FILM COATED ORAL 3 TIMES DAILY
Status: DISCONTINUED | OUTPATIENT
Start: 2024-02-17 | End: 2024-02-17 | Stop reason: HOSPADM

## 2024-02-16 RX ORDER — PANTOPRAZOLE SODIUM 40 MG/1
40 TABLET, DELAYED RELEASE ORAL
Status: DISCONTINUED | OUTPATIENT
Start: 2024-02-17 | End: 2024-02-17 | Stop reason: HOSPADM

## 2024-02-16 RX ORDER — ZOLPIDEM TARTRATE 5 MG/1
5 TABLET ORAL NIGHTLY PRN
Status: DISCONTINUED | OUTPATIENT
Start: 2024-02-16 | End: 2024-02-17 | Stop reason: HOSPADM

## 2024-02-16 RX ORDER — DIVALPROEX SODIUM 500 MG/1
500 TABLET, EXTENDED RELEASE ORAL 2 TIMES DAILY
Status: DISCONTINUED | OUTPATIENT
Start: 2024-02-17 | End: 2024-02-17 | Stop reason: HOSPADM

## 2024-02-16 RX ORDER — SODIUM CHLORIDE 0.9 % (FLUSH) 0.9 %
10 SYRINGE (ML) INJECTION EVERY 12 HOURS SCHEDULED
Status: DISCONTINUED | OUTPATIENT
Start: 2024-02-16 | End: 2024-02-17 | Stop reason: HOSPADM

## 2024-02-16 RX ORDER — NITROGLYCERIN 0.4 MG/1
0.4 TABLET SUBLINGUAL
Status: DISCONTINUED | OUTPATIENT
Start: 2024-02-16 | End: 2024-02-17 | Stop reason: HOSPADM

## 2024-02-16 RX ORDER — SODIUM CHLORIDE 9 MG/ML
40 INJECTION, SOLUTION INTRAVENOUS AS NEEDED
Status: DISCONTINUED | OUTPATIENT
Start: 2024-02-16 | End: 2024-02-17 | Stop reason: HOSPADM

## 2024-02-16 RX ORDER — ATORVASTATIN CALCIUM 10 MG/1
10 TABLET, FILM COATED ORAL DAILY
Status: DISCONTINUED | OUTPATIENT
Start: 2024-02-17 | End: 2024-02-17 | Stop reason: HOSPADM

## 2024-02-16 RX ORDER — BISACODYL 5 MG/1
5 TABLET, DELAYED RELEASE ORAL DAILY PRN
Status: DISCONTINUED | OUTPATIENT
Start: 2024-02-16 | End: 2024-02-17 | Stop reason: HOSPADM

## 2024-02-16 RX ORDER — HYDROCHLOROTHIAZIDE 25 MG/1
25 TABLET ORAL DAILY
Status: DISCONTINUED | OUTPATIENT
Start: 2024-02-17 | End: 2024-02-17 | Stop reason: HOSPADM

## 2024-02-16 RX ADMIN — ZOLPIDEM TARTRATE 5 MG: 5 TABLET ORAL at 23:28

## 2024-02-16 RX ADMIN — CLONIDINE HYDROCHLORIDE 0.2 MG: 0.1 TABLET ORAL at 23:28

## 2024-02-16 RX ADMIN — IOPAMIDOL 100 ML: 755 INJECTION, SOLUTION INTRAVENOUS at 18:33

## 2024-02-16 RX ADMIN — DIVALPROEX SODIUM 500 MG: 500 TABLET, EXTENDED RELEASE ORAL at 23:29

## 2024-02-16 NOTE — ED PROVIDER NOTES
Subjective   History of Present Illness  53-year-old male presents for chest pain and shortness of breath.  Been going on for 2 to 3 days.  Worse with exertion.  Recently here for left-sided DVT and underwent mechanical thrombectomy.  Symptoms especially shortness of breath is worse with exertion.  They are not constant.  Has been compliant with his anticoagulation he states.    Review of Systems  See HPI.  Past Medical History:   Diagnosis Date    Allergic     Arthritis     GERD (gastroesophageal reflux disease)     Hyperlipidemia     Hypertension        Allergies   Allergen Reactions    Lisinopril Angioedema       Past Surgical History:   Procedure Laterality Date    CARDIAC CATHETERIZATION N/A 2/5/2024    Procedure: Percutaneous Mechanical Thrombectomy;  Surgeon: Enrique To MD;  Location: [facility] CATH INVASIVE LOCATION;  Service: Cardiovascular;  Laterality: N/A;    COLONOSCOPY         Family History   Problem Relation Age of Onset    Heart disease Mother     Cancer Mother     Diabetes Maternal Uncle        Social History     Socioeconomic History    Marital status:    Tobacco Use    Smoking status: Never    Smokeless tobacco: Never   Vaping Use    Vaping Use: Never used   Substance and Sexual Activity    Alcohol use: Yes     Comment: socially on occasion    Drug use: Never    Sexual activity: Defer           Objective   Physical Exam  Constitutional:  No acute distress.  Head:  Atraumatic.  Normocephalic.   Eyes:  No scleral icterus. Normal conjunctivae  ENT:  Moist mucosa.  No nasal discharge present.  Cardiovascular:  Well perfused.  Equal pulses.  Regular rate.  Normal capillary refill.    Pulmonary/Chest:  No respiratory distress.  Airway patent.  No tachypnea.  No accessory muscle usage.  Clear to auscultation bilaterally  Abdominal:  Nondistended. Nontender.   Extremities: Left lower extremity edema consistent with patient's recently treated DVT.  No Deformity  Skin:  Warm, dry  Neurological:   "Alert, awake, and appropriate.  Normal speech.      Procedures           ED Course      /89 (BP Location: Right arm, Patient Position: Lying)   Pulse 83   Temp 98 °F (36.7 °C) (Oral)   Resp 10   Ht 172.7 cm (68\")   Wt 82.6 kg (182 lb 3.2 oz)   SpO2 97%   BMI 27.70 kg/m²   Labs Reviewed   COMPREHENSIVE METABOLIC PANEL - Abnormal; Notable for the following components:       Result Value    Creatinine 1.33 (*)     Chloride 97 (*)     CO2 32.0 (*)     ALT (SGPT) 49 (*)     AST (SGOT) 44 (*)     All other components within normal limits    Narrative:     GFR Normal >60  Chronic Kidney Disease <60  Kidney Failure <15     PROTIME-INR - Abnormal; Notable for the following components:    Protime 12.5 (*)     INR 1.16 (*)     All other components within normal limits   APTT - Abnormal; Notable for the following components:    PTT 30.8 (*)     All other components within normal limits   CBC WITH AUTO DIFFERENTIAL - Abnormal; Notable for the following components:    MCV 98.3 (*)     RDW 15.5 (*)     RDW-SD 55.1 (*)     Monocytes, Absolute 1.00 (*)     All other components within normal limits   COVID-19 AND FLU A/B, NP SWAB IN TRANSPORT MEDIA 1 HR TAT - Normal    Narrative:     Fact sheet for providers: https://www.fda.gov/media/210276/download    Fact sheet for patients: https://www.fda.gov/media/252073/download    Test performed by PCR.   SINGLE HSTROPONIN T - Normal    Narrative:     High Sensitive Troponin T Reference Range:  <14.0 ng/L- Negative Female for AMI  <22.0 ng/L- Negative Male for AMI  >=14 - Abnormal Female indicating possible myocardial injury.  >=22 - Abnormal Male indicating possible myocardial injury.   Clinicians would have to utilize clinical acumen, EKG, Troponin, and serial changes to determine if it is an Acute Myocardial Infarction or myocardial injury due to an underlying chronic condition.        BASIC METABOLIC PANEL   CBC WITH AUTO DIFFERENTIAL   CBC AND DIFFERENTIAL    Narrative:     " The following orders were created for panel order CBC & Differential.  Procedure                               Abnormality         Status                     ---------                               -----------         ------                     CBC Auto Differential[[id number]]        Abnormal            Final result                 Please view results for these tests on the individual orders.   EXTRA TUBES    Narrative:     The following orders were created for panel order Extra Tubes.  Procedure                               Abnormality         Status                     ---------                               -----------         ------                     Gold Top - SST[[id number]]                                   Final result                 Please view results for these tests on the individual orders.   GOLD TOP - SST   CBC AND DIFFERENTIAL    Narrative:     The following orders were created for panel order CBC & Differential.  Procedure                               Abnormality         Status                     ---------                               -----------         ------                     CBC Auto Differential[[id number]]                                                         Please view results for these tests on the individual orders.     Medications   sodium chloride 0.9 % flush 10 mL (has no administration in time range)   nitroglycerin (NITROSTAT) SL tablet 0.4 mg (has no administration in time range)   sodium chloride 0.9 % flush 10 mL ( Intravenous Canceled Entry 2/16/24 2148)   sodium chloride 0.9 % flush 10 mL (has no administration in time range)   sodium chloride 0.9 % infusion 40 mL (has no administration in time range)   sennosides-docusate (PERICOLACE) 8.6-50 MG per tablet 2 tablet (2 tablets Oral Not Given 2/16/24 2148)     And   polyethylene glycol (MIRALAX) packet 17 g (has no administration in time range)     And   bisacodyl (DULCOLAX) EC tablet 5 mg (has no administration in time  range)     And   bisacodyl (DULCOLAX) suppository 10 mg (has no administration in time range)   allopurinol (ZYLOPRIM) tablet 300 mg (has no administration in time range)   apixaban (ELIQUIS) tablet 5 mg (5 mg Oral Given 2/17/24 0011)   pantoprazole (PROTONIX) EC tablet 40 mg (has no administration in time range)   zolpidem (AMBIEN) tablet 5 mg (5 mg Oral Given 2/16/24 2328)   atorvastatin (LIPITOR) tablet 10 mg (has no administration in time range)   hydroCHLOROthiazide tablet 25 mg (has no administration in time range)   divalproex (DEPAKOTE ER) 24 hr tablet 500 mg (500 mg Oral Given 2/16/24 2329)   hydrALAZINE (APRESOLINE) tablet 100 mg (has no administration in time range)   cloNIDine (CATAPRES) tablet 0.2 mg (0.2 mg Oral Given 2/16/24 2328)   zolpidem (AMBIEN) tablet 5 mg (has no administration in time range)   iopamidol (ISOVUE-370) 76 % injection 100 mL (100 mL Intravenous Given 2/16/24 1833)     CT Angiogram Chest Pulmonary Embolism    Result Date: 2/16/2024  Impression: Suboptimal opacification of the pulmonary vasculature. No central pulmonary embolism visualized. Cannot evaluate the segmental and subsegmental branches. If clinical warranted, correlate with VQ scan. No evidence of aortic aneurysm or dissection. No acute intrathoracic process evident. Electronically Signed: Jerson Zhou MD  2/16/2024 6:39 PM EST  Workstation ID: [id number]             HEART Score: 4                              Medical Decision Making  Problems Addressed:  Chest pain, unspecified type: complicated acute illness or injury    Amount and/or Complexity of Data Reviewed  Labs: ordered.  Radiology: ordered.  ECG/medicine tests: ordered.    Risk  Prescription drug management.  Decision regarding hospitalization.    EKG interpretation: 4:19 PM, rate 75, normal sinus rhythm, normal axis, borderline prolonged QRS, nonspecific ST changes likely secondary to LVH that are unchanged from August 14, 2023 EKG.    My interpretation of CT  chest is no pneumothorax.  See above radiology interpretation.    CT nondiagnostic.  Troponin negative.  Having no chest pain.  Still observation.  VQ scan ordered.  Cardiology consulted.    Final diagnoses:   Chest pain, unspecified type       ED Disposition  ED Disposition       ED Disposition   Decision to Admit    Condition   --    Comment   --               No follow-up provider specified.       Medication List      No changes were made to your prescriptions during this visit.            Tayo Salomon MD  02/17/24 0232

## 2024-02-17 ENCOUNTER — APPOINTMENT (OUTPATIENT)
Dept: NUCLEAR MEDICINE | Facility: HOSPITAL | Age: 54
End: 2024-02-17
Payer: MEDICAID

## 2024-02-17 VITALS
RESPIRATION RATE: 14 BRPM | DIASTOLIC BLOOD PRESSURE: 110 MMHG | SYSTOLIC BLOOD PRESSURE: 157 MMHG | BODY MASS INDEX: 27.61 KG/M2 | TEMPERATURE: 98.9 F | OXYGEN SATURATION: 98 % | HEIGHT: 68 IN | WEIGHT: 182.2 LBS | HEART RATE: 84 BPM

## 2024-02-17 PROBLEM — N40.0 ENLARGED PROSTATE: Status: ACTIVE | Noted: 2019-12-03

## 2024-02-17 PROBLEM — N20.0 KIDNEY STONE: Status: ACTIVE | Noted: 2019-12-03

## 2024-02-17 PROBLEM — M51.26 HERNIATED LUMBAR INTERVERTEBRAL DISC: Status: ACTIVE | Noted: 2021-02-04

## 2024-02-17 PROBLEM — J30.2 SEASONAL ALLERGIES: Status: ACTIVE | Noted: 2021-02-04

## 2024-02-17 PROBLEM — M54.9 CHRONIC BACK PAIN: Status: ACTIVE | Noted: 2021-02-04

## 2024-02-17 PROBLEM — Z91.09 ENVIRONMENTAL ALLERGIES: Status: ACTIVE | Noted: 2022-02-09

## 2024-02-17 PROBLEM — Z86.718 HISTORY OF DVT (DEEP VEIN THROMBOSIS): Status: ACTIVE | Noted: 2024-02-05

## 2024-02-17 PROBLEM — G89.29 CHRONIC BACK PAIN: Status: ACTIVE | Noted: 2021-02-04

## 2024-02-17 LAB
ANION GAP SERPL CALCULATED.3IONS-SCNC: 10 MMOL/L (ref 5–15)
BASOPHILS # BLD AUTO: 0 10*3/MM3 (ref 0–0.2)
BASOPHILS NFR BLD AUTO: 0.6 % (ref 0–1.5)
BUN SERPL-MCNC: 13 MG/DL (ref 6–20)
BUN/CREAT SERPL: 10.7 (ref 7–25)
CALCIUM SPEC-SCNC: 9.2 MG/DL (ref 8.6–10.5)
CHLORIDE SERPL-SCNC: 100 MMOL/L (ref 98–107)
CO2 SERPL-SCNC: 28 MMOL/L (ref 22–29)
CREAT SERPL-MCNC: 1.21 MG/DL (ref 0.76–1.27)
DEPRECATED RDW RBC AUTO: 56 FL (ref 37–54)
EGFRCR SERPLBLD CKD-EPI 2021: 72 ML/MIN/1.73
EOSINOPHIL # BLD AUTO: 0.1 10*3/MM3 (ref 0–0.4)
EOSINOPHIL NFR BLD AUTO: 1.6 % (ref 0.3–6.2)
ERYTHROCYTE [DISTWIDTH] IN BLOOD BY AUTOMATED COUNT: 15.6 % (ref 12.3–15.4)
GLUCOSE SERPL-MCNC: 92 MG/DL (ref 65–99)
HCT VFR BLD AUTO: 43.9 % (ref 37.5–51)
HGB BLD-MCNC: 14.9 G/DL (ref 13–17.7)
LYMPHOCYTES # BLD AUTO: 2.2 10*3/MM3 (ref 0.7–3.1)
LYMPHOCYTES NFR BLD AUTO: 28.7 % (ref 19.6–45.3)
MCH RBC QN AUTO: 33.3 PG (ref 26.6–33)
MCHC RBC AUTO-ENTMCNC: 33.9 G/DL (ref 31.5–35.7)
MCV RBC AUTO: 98.1 FL (ref 79–97)
MONOCYTES # BLD AUTO: 0.9 10*3/MM3 (ref 0.1–0.9)
MONOCYTES NFR BLD AUTO: 11.1 % (ref 5–12)
NEUTROPHILS NFR BLD AUTO: 4.4 10*3/MM3 (ref 1.7–7)
NEUTROPHILS NFR BLD AUTO: 58 % (ref 42.7–76)
NRBC BLD AUTO-RTO: 0.1 /100 WBC (ref 0–0.2)
PLATELET # BLD AUTO: 209 10*3/MM3 (ref 140–450)
PMV BLD AUTO: 9.3 FL (ref 6–12)
POTASSIUM SERPL-SCNC: 3.5 MMOL/L (ref 3.5–5.2)
QT INTERVAL: 409 MS
QTC INTERVAL: 457 MS
RBC # BLD AUTO: 4.47 10*6/MM3 (ref 4.14–5.8)
SODIUM SERPL-SCNC: 138 MMOL/L (ref 136–145)
TROPONIN T SERPL HS-MCNC: 12 NG/L
WBC NRBC COR # BLD AUTO: 7.7 10*3/MM3 (ref 3.4–10.8)

## 2024-02-17 PROCEDURE — 0 TECHNETIUM TC99M PYROPHOSPHATE: Performed by: EMERGENCY MEDICINE

## 2024-02-17 PROCEDURE — 25010000002 MORPHINE PER 10 MG: Performed by: NURSE PRACTITIONER

## 2024-02-17 PROCEDURE — A9540 TC99M MAA: HCPCS | Performed by: EMERGENCY MEDICINE

## 2024-02-17 PROCEDURE — 0 TECHNETIUM ALBUMIN AGGREGATED: Performed by: EMERGENCY MEDICINE

## 2024-02-17 PROCEDURE — 84484 ASSAY OF TROPONIN QUANT: CPT | Performed by: NURSE PRACTITIONER

## 2024-02-17 PROCEDURE — 99214 OFFICE O/P EST MOD 30 MIN: CPT | Performed by: INTERNAL MEDICINE

## 2024-02-17 PROCEDURE — 85025 COMPLETE CBC W/AUTO DIFF WBC: CPT | Performed by: EMERGENCY MEDICINE

## 2024-02-17 PROCEDURE — 78582 LUNG VENTILAT&PERFUS IMAGING: CPT

## 2024-02-17 PROCEDURE — 80048 BASIC METABOLIC PNL TOTAL CA: CPT | Performed by: EMERGENCY MEDICINE

## 2024-02-17 PROCEDURE — G0378 HOSPITAL OBSERVATION PER HR: HCPCS

## 2024-02-17 PROCEDURE — A9538 TC99M PYROPHOSPHATE: HCPCS | Performed by: EMERGENCY MEDICINE

## 2024-02-17 RX ORDER — MORPHINE SULFATE 2 MG/ML
2 INJECTION, SOLUTION INTRAMUSCULAR; INTRAVENOUS EVERY 4 HOURS PRN
Status: DISCONTINUED | OUTPATIENT
Start: 2024-02-17 | End: 2024-02-17 | Stop reason: HOSPADM

## 2024-02-17 RX ORDER — CARVEDILOL 6.25 MG/1
12.5 TABLET ORAL 2 TIMES DAILY WITH MEALS
Status: DISCONTINUED | OUTPATIENT
Start: 2024-02-17 | End: 2024-02-17 | Stop reason: HOSPADM

## 2024-02-17 RX ORDER — CARVEDILOL 12.5 MG/1
12.5 TABLET ORAL 2 TIMES DAILY WITH MEALS
Qty: 60 TABLET | Refills: 0 | Status: SHIPPED | OUTPATIENT
Start: 2024-02-17 | End: 2024-02-19 | Stop reason: SDUPTHER

## 2024-02-17 RX ADMIN — HYDROCHLOROTHIAZIDE 25 MG: 25 TABLET ORAL at 11:10

## 2024-02-17 RX ADMIN — CLONIDINE HYDROCHLORIDE 0.2 MG: 0.1 TABLET ORAL at 11:25

## 2024-02-17 RX ADMIN — Medication 10 ML: at 08:00

## 2024-02-17 RX ADMIN — KIT FOR THE PREPARATION OF TECHNETIUM TC 99M ALBUMIN AGGREGATED 1 DOSE: 2.5 INJECTION, POWDER, FOR SOLUTION INTRAVENOUS at 09:55

## 2024-02-17 RX ADMIN — PANTOPRAZOLE SODIUM 40 MG: 40 TABLET, DELAYED RELEASE ORAL at 11:25

## 2024-02-17 RX ADMIN — MORPHINE SULFATE 2 MG: 2 INJECTION, SOLUTION INTRAMUSCULAR; INTRAVENOUS at 11:25

## 2024-02-17 RX ADMIN — DIVALPROEX SODIUM 500 MG: 500 TABLET, EXTENDED RELEASE ORAL at 11:25

## 2024-02-17 RX ADMIN — MORPHINE SULFATE 2 MG: 2 INJECTION, SOLUTION INTRAMUSCULAR; INTRAVENOUS at 07:20

## 2024-02-17 RX ADMIN — ALLOPURINOL 300 MG: 300 TABLET ORAL at 11:10

## 2024-02-17 RX ADMIN — APIXABAN 5 MG: 5 TABLET, FILM COATED ORAL at 00:11

## 2024-02-17 RX ADMIN — ATORVASTATIN CALCIUM 10 MG: 10 TABLET, FILM COATED ORAL at 11:10

## 2024-02-17 RX ADMIN — CARVEDILOL 12.5 MG: 6.25 TABLET, FILM COATED ORAL at 11:10

## 2024-02-17 RX ADMIN — TECHNETIUM TC99M PYROPHOSPHATE 1 DOSE: 12 INJECTION INTRAVENOUS at 09:15

## 2024-02-17 RX ADMIN — HYDRALAZINE HYDROCHLORIDE 100 MG: 25 TABLET ORAL at 11:10

## 2024-02-17 NOTE — DISCHARGE SUMMARY
[facility]    Marco A KaleighANA LUISA gonzalez    CHIEF COMPLAINT:     Chest Pain     HISTORY OF PRESENT ILLNESS:    Chest Pain   Pertinent negatives include no malaise/fatigue, nausea, shortness of breath or vomiting.     ED 02/16/2024  53-year-old male presents for chest pain and shortness of breath. Been going on for 2 to 3 days. Worse with exertion. Recently here for left-sided DVT and underwent mechanical thrombectomy. Symptoms especially shortness of breath is worse with exertion. They are not constant. Has been compliant with his anticoagulation he states.     Observation 02/17/2024  Patient agrees with HPI noted above including chest pain for the past 2 to 3 days.  Describes as dull and constant.  States that he has been taking Eliquis as prescribed.  Denies any history of asthma or COPD but reports a dry cough that he has had for a while.  Patient states he has an appointment with his cardiologist on Monday.    Past Medical History:   Diagnosis Date    Allergic     Arthritis     DVT (deep venous thrombosis) 02/05/2024    GERD (gastroesophageal reflux disease)     Hyperlipidemia     Hypertension      Past Surgical History:   Procedure Laterality Date    CARDIAC CATHETERIZATION N/A 2/5/2024    Procedure: Percutaneous Mechanical Thrombectomy;  Surgeon: Enrique To MD;  Location: [facility] INVASIVE LOCATION;  Service: Cardiovascular;  Laterality: N/A;    COLONOSCOPY       Family History   Problem Relation Age of Onset    Heart disease Mother     Cancer Mother     Heart attack Mother 53    Heart disease Maternal Uncle     Heart attack Maternal Uncle     Diabetes Maternal Uncle      Social History     Tobacco Use    Smoking status: Never    Smokeless tobacco: Never   Vaping Use    Vaping Use: Never used   Substance Use Topics    Alcohol use: Yes     Comment: socially on occasion    Drug use: Never     Medications Prior to Admission   Medication Sig Dispense Refill Last Dose    allopurinol  (ZYLOPRIM) 300 MG tablet Take 1 tablet by mouth Daily.   2/15/2024    apixaban (ELIQUIS) 5 MG tablet tablet Take 2 tablets by mouth 2 (Two) Times a Day for 6 days, THEN 1 tablet 2 (Two) Times a Day for 30 days. Indications: DVT/PE (active thrombosis) 84 tablet 0 2/16/2024    Cholecalciferol (VITAMIN D-3 PO) Take 1 capsule by mouth Daily.       cloNIDine (CATAPRES) 0.2 MG tablet Take 1 tablet by mouth 2 (Two) Times a Day.   2/16/2024    Cyanocobalamin (VITAMIN B 12 PO) Take 1 tablet by mouth Daily.       divalproex (DEPAKOTE ER) 500 MG 24 hr tablet Take 1 tablet by mouth 2 (Two) Times a Day.   2/15/2024    hydrALAZINE (APRESOLINE) 100 MG tablet Take 1 tablet by mouth 3 (Three) Times a Day.   2/16/2024    hydroCHLOROthiazide (HYDRODIURIL) 25 MG tablet Take 1 tablet by mouth Daily.   2/16/2024    lovastatin (MEVACOR) 20 MG tablet Take 1 tablet by mouth Every Night.   2/15/2024    nadolol (CORGARD) 40 MG tablet Take 1 tablet by mouth Daily.   2/15/2024    omeprazole (priLOSEC) 20 MG capsule Take 1 capsule by mouth 2 (Two) Times a Day.   2/16/2024    tiZANidine (ZANAFLEX) 4 MG tablet Take 1 tablet by mouth 3 (Three) Times a Day As Needed for Muscle Spasms.   2/15/2024    zolpidem (AMBIEN) 10 MG tablet Take 1 tablet by mouth At Night As Needed for Sleep.   2/15/2024    acetaminophen (TYLENOL) 325 MG tablet Take 2 tablets by mouth Every 6 (Six) Hours As Needed for Mild Pain.       diclofenac (VOLTAREN) 50 MG EC tablet Take 1 tablet by mouth 2 (Two) Times a Day As Needed (Headache).       naloxone (NARCAN) 4 MG/0.1ML nasal spray Call 911. Don't prime. [place] in 1 nostril for overdose. Repeat in 2-3 minutes in other nostril if no or minimal breathing/responsiveness. 2 each 0      Allergies:  Lisinopril    Immunization History   Administered Date(s) Administered    COVID-19 (MODERNA) 1st,2nd,3rd Dose Monovalent 08/13/2021, 09/14/2021    COVID-19 (MODERNA) Monovalent Original Booster 05/25/2022           REVIEW OF SYSTEMS:     Review of Systems   Constitutional: Negative for malaise/fatigue.   Cardiovascular:  Positive for chest pain.   Respiratory:  Negative for shortness of breath.    Musculoskeletal:         Leg pain, recent DVT   Gastrointestinal:  Negative for nausea and vomiting.   All other systems reviewed and are negative.        Vital Signs  Temp:  [97.8 °F (36.6 °C)-98.9 °F (37.2 °C)] 98.9 °F (37.2 °C)  Heart Rate:  [71-87] 84  Resp:  [10-20] 14  BP: (125-161)/() 157/110          Physical Exam:  Physical Exam  Vitals and nursing note reviewed.   Constitutional:       Appearance: Normal appearance.   HENT:      Head: Normocephalic and atraumatic.      Right Ear: External ear normal.      Left Ear: External ear normal.      Nose: Nose normal.      Mouth/Throat:      Mouth: Mucous membranes are moist.   Eyes:      Extraocular Movements: Extraocular movements intact.   Cardiovascular:      Rate and Rhythm: Normal rate and regular rhythm.      Pulses: Normal pulses.      Heart sounds: Normal heart sounds.   Pulmonary:      Effort: Pulmonary effort is normal.      Breath sounds: Normal breath sounds.   Abdominal:      General: Abdomen is flat. Bowel sounds are normal.      Palpations: Abdomen is soft.   Musculoskeletal:         General: Normal range of motion.      Cervical back: Normal range of motion.   Skin:     General: Skin is warm.   Neurological:      Mental Status: He is alert and oriented to person, place, and time.   Psychiatric:         Behavior: Behavior normal.         Thought Content: Thought content normal.         Judgment: Judgment normal.         Emotional Behavior:    Normal   Debilities:   None  Results Review:    I reviewed the patient's new clinical results.  Lab Results (most recent)       Procedure Component Value Units Date/Time    High Sensitivity Troponin T [[id number]]  (Normal) Collected: 02/17/24 0335    Specimen: Blood from Arm, Right Updated: 02/17/24 0744     HS Troponin T 12 ng/L      Narrative:      High Sensitive Troponin T Reference Range:  <14.0 ng/L- Negative Female for AMI  <22.0 ng/L- Negative Male for AMI  >=14 - Abnormal Female indicating possible myocardial injury.  >=22 - Abnormal Male indicating possible myocardial injury.   Clinicians would have to utilize clinical acumen, EKG, Troponin, and serial changes to determine if it is an Acute Myocardial Infarction or myocardial injury due to an underlying chronic condition.         Basic Metabolic Panel [[id number]]  (Normal) Collected: 02/17/24 0335    Specimen: Blood from Arm, Right Updated: 02/17/24 0533     Glucose 92 mg/dL      BUN 13 mg/dL      Creatinine 1.21 mg/dL      Sodium 138 mmol/L      Potassium 3.5 mmol/L      Chloride 100 mmol/L      CO2 28.0 mmol/L      Calcium 9.2 mg/dL      BUN/Creatinine Ratio 10.7     Anion Gap 10.0 mmol/L      eGFR 72.0 mL/min/1.73     Narrative:      GFR Normal >60  Chronic Kidney Disease <60  Kidney Failure <15      CBC & Differential [[id number]]  (Abnormal) Collected: 02/17/24 0335    Specimen: Blood from Arm, Right Updated: 02/17/24 0516    Narrative:      The following orders were created for panel order CBC & Differential.  Procedure                               Abnormality         Status                     ---------                               -----------         ------                     CBC Auto Differential[[id number]]        Abnormal            Final result                 Please view results for these tests on the individual orders.    CBC Auto Differential [[id number]]  (Abnormal) Collected: 02/17/24 0335    Specimen: Blood from Arm, Right Updated: 02/17/24 0516     WBC 7.70 10*3/mm3      RBC 4.47 10*6/mm3      Hemoglobin 14.9 g/dL      Hematocrit 43.9 %      MCV 98.1 fL      MCH 33.3 pg      MCHC 33.9 g/dL      RDW 15.6 %      RDW-SD 56.0 fl      MPV 9.3 fL      Platelets 209 10*3/mm3      Neutrophil % 58.0 %      Lymphocyte % 28.7 %      Monocyte % 11.1 %      Eosinophil % 1.6 %       Basophil % 0.6 %      Neutrophils, Absolute 4.40 10*3/mm3      Lymphocytes, Absolute 2.20 10*3/mm3      Monocytes, Absolute 0.90 10*3/mm3      Eosinophils, Absolute 0.10 10*3/mm3      Basophils, Absolute 0.00 10*3/mm3      nRBC 0.1 /100 WBC     COVID-19 and FLU A/B PCR, 1 HR TAT - Swab, Nasopharynx [[id number]]  (Normal) Collected: 02/16/24 1808    Specimen: Swab from Nasopharynx Updated: 02/16/24 1833     COVID19 Not Detected     Influenza A PCR Not Detected     Influenza B PCR Not Detected    Narrative:      Fact sheet for providers: https://www.fda.gov/media/903100/download    Fact sheet for patients: https://www.fda.gov/media/302987/download    Test performed by PCR.    Extra Tubes [[id number]] Collected: 02/16/24 1709    Specimen: Blood, Venous Line Updated: 02/16/24 1815    Narrative:      The following orders were created for panel order Extra Tubes.  Procedure                               Abnormality         Status                     ---------                               -----------         ------                     Gold Top - SST[[id number]]                                   Final result                 Please view results for these tests on the individual orders.    Gold Top - SST [[id number]] Collected: 02/16/24 1709    Specimen: Blood Updated: 02/16/24 1815     Extra Tube Hold for add-ons.     Comment: Auto resulted.       Comprehensive Metabolic Panel [[id number]]  (Abnormal) Collected: 02/16/24 1710    Specimen: Blood Updated: 02/16/24 1737     Glucose 89 mg/dL      BUN 12 mg/dL      Creatinine 1.33 mg/dL      Sodium 139 mmol/L      Potassium 3.6 mmol/L      Chloride 97 mmol/L      CO2 32.0 mmol/L      Calcium 10.0 mg/dL      Total Protein 7.9 g/dL      Albumin 4.7 g/dL      ALT (SGPT) 49 U/L      AST (SGOT) 44 U/L      Alkaline Phosphatase 71 U/L      Total Bilirubin 0.6 mg/dL      Globulin 3.2 gm/dL      A/G Ratio 1.5 g/dL      BUN/Creatinine Ratio 9.0     Anion Gap 10.0 mmol/L      eGFR 64.3  mL/min/1.73     Narrative:      GFR Normal >60  Chronic Kidney Disease <60  Kidney Failure <15      Single High Sensitivity Troponin T [[id number]]  (Normal) Collected: 02/16/24 1710    Specimen: Blood Updated: 02/16/24 1737     HS Troponin T 13 ng/L     Narrative:      High Sensitive Troponin T Reference Range:  <14.0 ng/L- Negative Female for AMI  <22.0 ng/L- Negative Male for AMI  >=14 - Abnormal Female indicating possible myocardial injury.  >=22 - Abnormal Male indicating possible myocardial injury.   Clinicians would have to utilize clinical acumen, EKG, Troponin, and serial changes to determine if it is an Acute Myocardial Infarction or myocardial injury due to an underlying chronic condition.         Protime-INR [[id number]]  (Abnormal) Collected: 02/16/24 1710    Specimen: Blood Updated: 02/16/24 1725     Protime 12.5 Seconds      INR 1.16    aPTT [[id number]]  (Abnormal) Collected: 02/16/24 1710    Specimen: Blood Updated: 02/16/24 1725     PTT 30.8 seconds     CBC & Differential [[id number]]  (Abnormal) Collected: 02/16/24 1709    Specimen: Blood Updated: 02/16/24 1713    Narrative:      The following orders were created for panel order CBC & Differential.  Procedure                               Abnormality         Status                     ---------                               -----------         ------                     CBC Auto Differential[[id number]]        Abnormal            Final result                 Please view results for these tests on the individual orders.    CBC Auto Differential [[id number]]  (Abnormal) Collected: 02/16/24 1709    Specimen: Blood Updated: 02/16/24 1713     WBC 8.70 10*3/mm3      RBC 4.94 10*6/mm3      Hemoglobin 16.2 g/dL      Hematocrit 48.6 %      MCV 98.3 fL      MCH 32.7 pg      MCHC 33.3 g/dL      RDW 15.5 %      RDW-SD 55.1 fl      MPV 8.7 fL      Platelets 244 10*3/mm3      Neutrophil % 53.0 %      Lymphocyte % 33.1 %      Monocyte % 11.5 %      Eosinophil % 1.7 %       Basophil % 0.7 %      Neutrophils, Absolute 4.60 10*3/mm3      Lymphocytes, Absolute 2.90 10*3/mm3      Monocytes, Absolute 1.00 10*3/mm3      Eosinophils, Absolute 0.10 10*3/mm3      Basophils, Absolute 0.10 10*3/mm3      nRBC 0.1 /100 WBC             Imaging Results (Most Recent)       Procedure Component Value Units Date/Time    NM Lung Ventilation Perfusion [[id number]] Collected: 02/17/24 1139     Updated: 02/17/24 1143    Narrative:      NM LUNG VENTILATION PERFUSION    Date of Exam: 2/17/2024 10:24 AM EST    Indication: recent dvt, cta equivocal.    Comparison: CT angiogram 2/16/2024    Technique:  The patient was ventilated with 43.0 mCi of technetium 99m pyrophosphate aerosol and ventilation images were acquired in multiple obliquities. The patient then received 5.1 mCi of technetium 99m MAA intravenously and perfusion images were   acquired in multiple obliquities.      Findings:  Ventilation images demonstrate normal homogeneous distribution of radio aerosol. Perfusion images also demonstrate normal physiologic distribution of the radiopharmaceutical. No perfusion defects identified.      Impression:      Impression:  Normal nuclear medicine ventilation perfusion lung scan        Electronically Signed: Tarun Sanz MD    2/17/2024 11:41 AM EST    Workstation ID: [id number]    CT Angiogram Chest Pulmonary Embolism [[id number]] Collected: 02/16/24 1836     Updated: 02/16/24 1841    Narrative:      CT ANGIOGRAM CHEST PULMONARY EMBOLISM    Date of Exam: 2/16/2024 6:23 PM EST    Indication: Pulmonary embolism (PE) suspected, high prob.    Comparison: None available.    Technique: Axial CT images were obtained of the chest after the uneventful intravenous administration of iodinated contrast utilizing pulmonary embolism protocol.  Sagittal and coronal reconstructions were performed.  Automated exposure control and   iterative reconstruction methods were used.      Findings:    Pulmonary arteries: There is  suboptimal opacification of the pulmonary vasculature. No central pulmonary embolism visualized. Cannot evaluate the segmental and subsegmental branches.    Thoracic aorta: The thoracic aorta is normal in caliber and contour.    Thyroid: The visualized portion of the thyroid is unremarkable.    Lungs and Pleura: No focal consolidation. No pleural effusion. No suspicious pulmonary nodules. No pneumothorax.    Mediastinum/[place]: No mediastinal or hilar lymphadenopathy.    Lymph nodes: No axillary or supraclavicular lymphadenopathy.    Cardiovascular: The heart is normal in size.No evidence of pericardial effusion. Coronary calcifications are not visualized.    Upper Abdomen: No acute process in the upper abdomen. Patient is post left nephrectomy.    Bones and Soft Tissue: No acute fracture, aggressive osseous lesions, or soft tissue process.        Impression:      Impression:    Suboptimal opacification of the pulmonary vasculature. No central pulmonary embolism visualized. Cannot evaluate the segmental and subsegmental branches. If clinical warranted, correlate with VQ scan.    No evidence of aortic aneurysm or dissection.    No acute intrathoracic process evident.        Electronically Signed: Jerson Zhou MD    2/16/2024 6:39 PM EST    Workstation ID: [id number]          reviewed    ECG/EMG Results (most recent)       Procedure Component Value Units Date/Time    ECG 12 Lead Chest Pain [[id number]] Collected: 02/16/24 1926     Updated: 02/16/24 1928     QT Interval 409 ms      QTC Interval 453 ms     Narrative:      HEART RATE= 73  bpm  RR Interval= 816  ms  NH Interval= 193  ms  P Horizontal Axis= -10  deg  P Front Axis= 30  deg  QRSD Interval= 105  ms  QT Interval= 409  ms  QTcB= 453  ms  QRS Axis= -11  deg  T Wave Axis= 19  deg  - ABNORMAL ECG -  Sinus rhythm  Left ventricular hypertrophy  Anterior infarct, old  When compared with ECG of 16-Feb-2024 16:19:21,  No significant change  Electronically Signed By:    Date and Time of Study: 2024-02-16 19:26:04    SCANNED - TELEMETRY   [[id number]] Resulted: 02/16/24     Updated: 02/16/24 2128    SCANNED - TELEMETRY   [[id number]] Resulted: 02/16/24     Updated: 02/17/24 0259    ECG 12 Lead Chest Pain [[id number]] Collected: 02/16/24 1619     Updated: 02/17/24 0903     QT Interval 409 ms      QTC Interval 457 ms     Narrative:      HEART RATE= 75  bpm  RR Interval= 800  ms  WI Interval= 192  ms  P Horizontal Axis= -21  deg  P Front Axis= 27  deg  QRSD Interval= 101  ms  QT Interval= 409  ms  QTcB= 457  ms  QRS Axis= 1  deg  T Wave Axis= 36  deg  - ABNORMAL ECG -  Sinus rhythm  Anterior infarct, old  When compared with ECG of 14-Aug-2023 7:29:44,  Significant axis, voltage or hypertrophy change  Electronically Signed By: Tayo Salomon (YOEL) 17-Feb-2024 09:03:09  Date and Time of Study: 2024-02-16 16:19:21          reviewed        Results for orders placed during the hospital encounter of 02/05/24    Adult Transthoracic Echo Complete w/ Color, Spectral and Contrast if necessary per protocol    Interpretation Summary    Left ventricular systolic function is normal. Calculated left ventricular EF = 56% Left ventricular ejection fraction appears to be 56 - 60%.    Left ventricular diastolic function is consistent with (grade I) impaired relaxation.  GLS -14.8%.    Estimated right ventricular systolic pressure from tricuspid regurgitation is normal (<35 mmHg).    No significant valvular abnormalities noted.      Microbiology Results (last 10 days)       Procedure Component Value - Date/Time    COVID-19 and FLU A/B PCR, 1 HR TAT - Swab, Nasopharynx [[id number]]  (Normal) Collected: 02/16/24 1808    Lab Status: Final result Specimen: Swab from Nasopharynx Updated: 02/16/24 1833     COVID19 Not Detected     Influenza A PCR Not Detected     Influenza B PCR Not Detected    Narrative:      Fact sheet for providers: https://www.fda.gov/media/564273/download    Fact sheet for patients:  https://www.fda.gov/media/738606/download    Test performed by PCR.            Assessment & Plan     Acute chest pain    Essential hypertension    Mixed hyperlipidemia    History of DVT (deep vein thrombosis)        Chest pain  Lab Results   Component Value Date    TROPONINT 12 02/17/2024    TROPONINT 13 02/16/2024    TROPONINT 8 08/14/2023   -CMP and CBC unremarkable  -Chest X-ray: Showed no acute process  -EKG: Sinus rhythm with heart rate of 73, DC interval 193 and   -In the ED pt given iopamidol  -CT chest showed no central pulmonary embolism.  Segmental and subsegmental branches not able to visualize.  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  Post balloon angioplasty and stenosis reduced to 20% in both veins  -Cardiology consulted and started patient on Coreg 12.5 twice daily.  Discontinue nadolol 40 mg daily  -Follow-up with primary cardiologist on Monday.  Stress test will be arranged outpatient  -Telemetry  -Continue Eliquis    Hypertension  -Poorly controlled   BP Readings from Last 1 Encounters:   02/17/24 (!) 157/110   -Discontinue nadolol  -Start Coreg  - Continue clonidine, hydralazine, and HCTZ  - Monitor while admitted     History of seizures  -Continue Depakote    History of gout  -Continue allopurinol    History of insomnia  -Continue Ambien    I discussed the patients findings and my recommendations with patient and nursing staff    Discharge Diagnosis:      Acute chest pain    Essential hypertension    Mixed hyperlipidemia    History of DVT (deep vein thrombosis)      Hospital Course  Patient is a 53 y.o. male presented with chest pain as noted in HPI above.  Troponins, CMP and CBC unremarkable.  Recent heart cath showed stenosis of femoral and iliac veins with successful angioplasty that reduced to stenosis from 80% to  20%.  Patient is currently on Eliquis for that.  Chest x-ray showed no acute process and EKG showed sinus rhythm.  Patient had a CT chest in the ED that showed no central pulmonary embolism but because segmental and subsegmental branches were not able to be visualized a VQ scan was recommended.  Patient was kept overnight in the observation unit for cardiology consultation and VQ scan.  VQ scan was normal.  Cardiology cleared patient for discharge with instructions to restart Coreg and discontinue nadolol.  Patient already has an appointment with cardiologist on Monday.  Per cardiology NP stress test will be arranged outpatient. At this time, patient felt to be in good condition for discharge with close follow up with PCP. Instructed to take all medications as prescribed and to return to ED if any concerning signs/symptoms. All test/lab results were discussed with patient. All questions were answered and patient verbalizes understanding.   .      Past Medical History:     Past Medical History:   Diagnosis Date    Allergic     Arthritis     DVT (deep venous thrombosis) 02/05/2024    GERD (gastroesophageal reflux disease)     Hyperlipidemia     Hypertension        Past Surgical History:     Past Surgical History:   Procedure Laterality Date    CARDIAC CATHETERIZATION N/A 2/5/2024    Procedure: Percutaneous Mechanical Thrombectomy;  Surgeon: Enrique To MD;  Location: [facility] CATH INVASIVE LOCATION;  Service: Cardiovascular;  Laterality: N/A;    COLONOSCOPY         Social History:   Social History     Socioeconomic History    Marital status:    Tobacco Use    Smoking status: Never    Smokeless tobacco: Never   Vaping Use    Vaping Use: Never used   Substance and Sexual Activity    Alcohol use: Yes     Comment: socially on occasion    Drug use: Never    Sexual activity: Defer       Procedures Performed         Consults:   Consults       Date and Time Order Name Status Description    2/16/2024  7:19 PM Inpatient  Cardiology Consult Completed     2/5/2024  3:29 PM Cardiology (on-call MD unless specified) Completed             Condition on Discharge:     Stable    Discharge Disposition  Home or Self Care    Discharge Medications     Discharge Medications        New Medications        Instructions Start Date   carvedilol 12.5 MG tablet  Commonly known as: COREG   12.5 mg, Oral, 2 Times Daily With Meals             Continue These Medications        Instructions Start Date   acetaminophen 325 MG tablet  Commonly known as: TYLENOL   650 mg, Oral, Every 6 Hours PRN      allopurinol 300 MG tablet  Commonly known as: ZYLOPRIM   300 mg, Oral, Daily      cloNIDine 0.2 MG tablet  Commonly known as: CATAPRES   0.2 mg, Oral, 2 Times Daily      diclofenac 50 MG EC tablet  Commonly known as: VOLTAREN   50 mg, Oral, 2 Times Daily PRN      divalproex 500 MG 24 hr tablet  Commonly known as: DEPAKOTE ER   500 mg, Oral, 2 Times Daily      Eliquis 5 MG tablet tablet  Generic drug: apixaban   Take 2 tablets by mouth 2 (Two) Times a Day for 6 days, THEN 1 tablet 2 (Two) Times a Day for 30 days. Indications: DVT/PE (active thrombosis)   Start Date: February 6, 2024     hydrALAZINE 100 MG tablet  Commonly known as: APRESOLINE   100 mg, Oral, 3 Times Daily      hydroCHLOROthiazide 25 MG tablet   25 mg, Oral, Daily      lovastatin 20 MG tablet  Commonly known as: MEVACOR   20 mg, Oral, Nightly      naloxone 4 MG/0.1ML nasal spray  Commonly known as: NARCAN   Call 911. Don't prime. [place] in 1 nostril for overdose. Repeat in 2-3 minutes in other nostril if no or minimal breathing/responsiveness.      omeprazole 20 MG capsule  Commonly known as: priLOSEC   20 mg, Oral, 2 Times Daily      tiZANidine 4 MG tablet  Commonly known as: ZANAFLEX   4 mg, Oral, 3 Times Daily PRN      VITAMIN B 12 PO   1 tablet, Oral, Daily      VITAMIN D-3 PO   1 capsule, Oral, Daily      zolpidem 10 MG tablet  Commonly known as: AMBIEN   10 mg, Oral, Nightly PRN             Stop  These Medications      nadolol 40 MG tablet  Commonly known as: CORGARD              Discharge Diet:   Diet Instructions       Diet: Cardiac Diets; Healthy Heart (2-3 Na+); Regular Texture (IDDSI 7); Thin (IDDSI 0)      Discharge Diet: Cardiac Diets    Cardiac Diet: Healthy Heart (2-3 Na+)    Texture: Regular Texture (IDDSI 7)    Fluid Consistency: Thin (IDDSI 0)            Activity at Discharge:     Follow-up Appointments  Future Appointments   Date Time Provider Department Center   2/19/2024  1:30 PM Enrique To MD [facility]   3/1/2024  8:45 AM LAB MD [facility] ONC LAB NA  [facility]   3/1/2024  9:00 AM Brayan Ramírez MD [facility] ONC NA Y[facility]     Additional Instructions for the Follow-ups that You Need to Schedule       Discharge Follow-up with PCP   As directed       Currently Documented PCP:    Kaleigh Strickland APRN    PCP Phone Number:    [phone]     Follow Up Details: 5-7 days        Discharge Follow-up with Specified Provider: Cardiology   As directed      To: Cardiology   Follow Up Details: Monday                Test Results Pending at Discharge       Risk for Readmission (LACE) Score: 2 (2/17/2024  6:00 AM)      Greater than 30 minutes spent in discharge activities for this patient    Signature:Electronically signed by ANA LUISA Mathias, 02/17/24, 12:30 PM EST.

## 2024-02-17 NOTE — PLAN OF CARE
Goal Outcome Evaluation:  Plan of Care Reviewed With: patient        Progress: no change  Outcome Evaluation: pt admitted for c/p and c/o soa. No s/s of distress at this time. Pt on room air and denies any soa or cp. Pt recently admitted for dvt in e. CTPE done and VQ scan was recommended.

## 2024-02-17 NOTE — ED NOTES
Nursing report ED to floor  Spencer Brothers  53 y.o.  male    HPI:   Chief Complaint   Patient presents with    Chest Pain     Chest pain for a couple of days, some soa and nausea.          Admitting doctor:   No admitting provider for patient encounter.    Admitting diagnosis:   The encounter diagnosis was Chest pain, unspecified type.    Code status:   Current Code Status       Date Active Code Status Order ID Comments User Context       Prior            Allergies:   Lisinopril    Isolation:  No active isolations     Fall Risk:  Fall Risk Assessment was completed, and patient is at low risk for falls.   Predictive Model Details         4 (Low) Factor Value    Calculated 2/16/2024 20:14 Age 53    Risk of Fall Model Musculoskeletal Assessment WDL     Active Peripheral IV Present     Imaging order in this encounter Present     Respiratory Rate 20     Skin Assessment WDL     Magnesium not on file     Drug Use No     Thor Scale not on file     Chloride 97 mmol/L     Peripheral Vascular Assessment WDL     Financial Class Medicaid     Clinically Relevant Sex Not Female     Gastrointestinal Assessment WDL     Creatinine 1.33 mg/dL     Number of Distinct Medication Classes administered 1     ALT 49 U/L     Total Bilirubin 0.6 mg/dL     Cardiac Assessment WDL     Diastolic      Days after Admission 0.167     Calcium 10 mg/dL     Albumin 4.7 g/dL     Potassium 3.6 mmol/L         Weight:       02/16/24  1613   Weight: 84.1 kg (185 lb 6.5 oz)       Intake and Output  No intake or output data in the 24 hours ending 02/16/24 2014    Diet:   Dietary Orders (From admission, onward)       Start     Ordered    02/17/24 0001  NPO Diet NPO Type: Strict NPO  Diet Effective Midnight        Question:  NPO Type  Answer:  Strict NPO    02/16/24 1919 02/16/24 1918  Diet: Regular/House Diet; Texture: Regular Texture (IDDSI 7); Fluid Consistency: Thin (IDDSI 0)  Diet Effective Now        References:    Diet Order [place]  "  Question Answer Comment   Diets: Regular/House Diet    Texture: Regular Texture (IDDSI 7)    Fluid Consistency: Thin (IDDSI 0)        02/16/24 1919                     Most recent vitals:   Vitals:    02/16/24 1613 02/16/24 1716 02/16/24 1816   BP: (!) 161/106 137/96 138/100   BP Location: Left arm     Patient Position: Sitting     Pulse: 73 75 87   Resp: 20     Temp: 97.8 °F (36.6 °C)     TempSrc: Oral     SpO2: 99% 98% 95%   Weight: 84.1 kg (185 lb 6.5 oz)     Height: 172.7 cm (68\")         Active LDAs/IV Access:   Lines, Drains & Airways       Active LDAs       Name Placement date Placement time Site Days    Peripheral IV 02/16/24 1714 Left Antecubital 02/16/24 1714  Antecubital  less than 1                    Skin Condition:   Skin Assessments (last day)       None             Labs (abnormal labs have a star):   Labs Reviewed   COMPREHENSIVE METABOLIC PANEL - Abnormal; Notable for the following components:       Result Value    Creatinine 1.33 (*)     Chloride 97 (*)     CO2 32.0 (*)     ALT (SGPT) 49 (*)     AST (SGOT) 44 (*)     All other components within normal limits    Narrative:     GFR Normal >60  Chronic Kidney Disease <60  Kidney Failure <15     PROTIME-INR - Abnormal; Notable for the following components:    Protime 12.5 (*)     INR 1.16 (*)     All other components within normal limits   APTT - Abnormal; Notable for the following components:    PTT 30.8 (*)     All other components within normal limits   CBC WITH AUTO DIFFERENTIAL - Abnormal; Notable for the following components:    MCV 98.3 (*)     RDW 15.5 (*)     RDW-SD 55.1 (*)     Monocytes, Absolute 1.00 (*)     All other components within normal limits   COVID-19 AND FLU A/B, NP SWAB IN TRANSPORT MEDIA 1 HR TAT - Normal    Narrative:     Fact sheet for providers: https://www.fda.gov/media/054716/download    Fact sheet for patients: https://www.fda.gov/media/976437/download    Test performed by PCR.   SINGLE HSTROPONIN T - Normal    " Narrative:     High Sensitive Troponin T Reference Range:  <14.0 ng/L- Negative Female for AMI  <22.0 ng/L- Negative Male for AMI  >=14 - Abnormal Female indicating possible myocardial injury.  >=22 - Abnormal Male indicating possible myocardial injury.   Clinicians would have to utilize clinical acumen, EKG, Troponin, and serial changes to determine if it is an Acute Myocardial Infarction or myocardial injury due to an underlying chronic condition.        CBC AND DIFFERENTIAL    Narrative:     The following orders were created for panel order CBC & Differential.  Procedure                               Abnormality         Status                     ---------                               -----------         ------                     CBC Auto Differential[[id number]]        Abnormal            Final result                 Please view results for these tests on the individual orders.   EXTRA TUBES    Narrative:     The following orders were created for panel order Extra Tubes.  Procedure                               Abnormality         Status                     ---------                               -----------         ------                     Gold Top - SST[[id number]]                                   Final result                 Please view results for these tests on the individual orders.   GOLD TOP - [facility]       LOC: Person, Place, Time, and Situation    Telemetry:  Observation Unit    Cardiac Monitoring Ordered: yes    EKG:   ECG 12 Lead Chest Pain   Preliminary Result   HEART RATE= 73  bpm   RR Interval= 816  ms   SD Interval= 193  ms   P Horizontal Axis= -10  deg   P Front Axis= 30  deg   QRSD Interval= 105  ms   QT Interval= 409  ms   QTcB= 453  ms   QRS Axis= -11  deg   T Wave Axis= 19  deg   - ABNORMAL ECG -   Sinus rhythm   Left ventricular hypertrophy   Anterior infarct, old   When compared with ECG of 16-Feb-2024 16:19:21,   No significant change   Electronically Signed By:    Date and Time of Study:  2024-02-16 19:26:04      ECG 12 Lead Chest Pain   Preliminary Result   HEART RATE= 75  bpm   RR Interval= 800  ms   TX Interval= 192  ms   P Horizontal Axis= -21  deg   P Front Axis= 27  deg   QRSD Interval= 101  ms   QT Interval= 409  ms   QTcB= 457  ms   QRS Axis= 1  deg   T Wave Axis= 36  deg   - ABNORMAL ECG -   Sinus rhythm   Anterior infarct, old   When compared with ECG of 14-Aug-2023 7:29:44,   Significant axis, voltage or hypertrophy change   Electronically Signed By:    Date and Time of Study: 2024-02-16 16:19:21          Medications Given in the ED:   Medications   sodium chloride 0.9 % flush 10 mL (has no administration in time range)   nitroglycerin (NITROSTAT) SL tablet 0.4 mg (has no administration in time range)   sodium chloride 0.9 % flush 10 mL (has no administration in time range)   sodium chloride 0.9 % flush 10 mL (has no administration in time range)   sodium chloride 0.9 % infusion 40 mL (has no administration in time range)   sennosides-docusate (PERICOLACE) 8.6-50 MG per tablet 2 tablet (has no administration in time range)     And   polyethylene glycol (MIRALAX) packet 17 g (has no administration in time range)     And   bisacodyl (DULCOLAX) EC tablet 5 mg (has no administration in time range)     And   bisacodyl (DULCOLAX) suppository 10 mg (has no administration in time range)   iopamidol (ISOVUE-370) 76 % injection 100 mL (100 mL Intravenous Given 2/16/24 0033)       Imaging results:  CT Angiogram Chest Pulmonary Embolism    Result Date: 2/16/2024  Impression: Suboptimal opacification of the pulmonary vasculature. No central pulmonary embolism visualized. Cannot evaluate the segmental and subsegmental branches. If clinical warranted, correlate with VQ scan. No evidence of aortic aneurysm or dissection. No acute intrathoracic process evident. Electronically Signed: Jerson Zhou MD  2/16/2024 6:39 PM EST  Workstation ID: [id number]     Social issues:   Social History     Socioeconomic  History    Marital status:    Tobacco Use    Smoking status: Never    Smokeless tobacco: Never   Vaping Use    Vaping Use: Never used   Substance and Sexual Activity    Alcohol use: Yes     Comment: socially on occasion    Drug use: Never    Sexual activity: Defer       NIH Stroke Scale:  Interval: (not recorded)  1a. Level of Consciousness: (not recorded)  1b. LOC Questions: (not recorded)  1c. LOC Commands: (not recorded)  2. Best Gaze: (not recorded)  3. Visual: (not recorded)  4. Facial Palsy: (not recorded)  5a. Motor Arm, Left: (not recorded)  5b. Motor Arm, Right: (not recorded)  6a. Motor Leg, Left: (not recorded)  6b. Motor Leg, Right: (not recorded)  7. Limb Ataxia: (not recorded)  8. Sensory: (not recorded)  9. Best Language: (not recorded)  10. Dysarthria: (not recorded)  11. Extinction and Inattention (formerly Neglect): (not recorded)    Total (NIH Stroke Scale): (not recorded)     Additional notable assessment information:    Nursing report ED to floor:  Robert Moore LPN   02/16/24 20:14 EST

## 2024-02-17 NOTE — PLAN OF CARE
Goal Outcome Evaluation:    VQ scan today. IV meds given as needed for LLE pain management.

## 2024-02-17 NOTE — CONSULTS
Referring Provider: Tayo Salomon MD    Reason for Consultation:  chest pain      Patient Care Team:  Kaleigh Strickland APRN as PCP - General (Nurse Practitioner)      Cardiology assessment and plan      Chest pain  Recent diagnosis of DVT requiring endovascular intervention  CT of the chest with no evidence of any pulmonary embolism  VQ scan with a low risk and low probability for pulmonary embolism  Recent echocardiogram with normal LV systolic function  Hypertension and uncontrolled hypertension  Hyperlipidemia  DVT currently on anticoagulation therapy    Patient denies any further chest pain denies any cardiac complaints  Normal troponin  No acute EKG changes  Poorly controlled hypertension  Tmax is 98.9 pulse is 74 respirations are 14 blood pressure is 156/110 sats are 98%  Normal troponin  Sodium is 138 potassium is 3.5 creatinine is 1.2 hemoglobin is 14.9  Normal normal nuclear medicine lung scan  CT chest with no pulmonary embolism  Twelve-lead EKG with no acute ischemic EKG changes  Patient is advised to consider a myocardial perfusion study for further risk stratification  Patient likes to go home and do it as an outpatient  Risk benefits and alternatives reviewed and discussed with patient  Need for compliance with medical therapy close monitoring and follow-up reviewed and discussed with patient and family  Aggressive risk factor modification including better control of blood pressure  Follow-up in office next week          SUBJECTIVE     Chief Complaint:  chest pain    History of present illness:  Spencer Brothers is a 53 y.o. male with a history of hypertension, hyperlipidemia, and recent left lower extremity DVT status post percutaneous mechanical thrombectomy who presented to [facility] on 2/16/2024 complaining of chest pain. The patient reports a pressure in his left chest that has been present for several days. He also reports some shortness of breath. He states he has been trying to be  active, but rested for a few days when his symptoms started. He denies any alleviating factors. He states his blood pressure has still been elevated despite being started on hydralazine 100 mg three times daily recently. He reports a family history of premature CAD in his mother and maternal uncle. Workup in the ER revealed negative high sensitivity troponin and uncontrolled hypertension. His ECG showed no acute ischemia. His CT chest ruled out central pulmonary embolism, but his segmental and subsegmental branches could not be evaluated. He was admitted for observation. Cardiology was consulted for further evaluation.       Review of systems:    Constitutional: No weakness, fatigue, fever, rigors, chills   Eyes: No vision changes, eye pain   ENT/oropharynx: No difficulty swallowing, sore throat, epistaxis, changes in hearing   Cardiovascular: + chest pain.  No palpitations, paroxysmal nocturnal dyspnea, orthopnea, diaphoresis, dizziness / syncopal episode   Respiratory: + shortness of breath.  No cough, wheezing, hemoptysis   Gastrointestinal: No abdominal pain, nausea, vomiting, diarrhea, bloody stools   Genitourinary: No hematuria, dysuria   Neurological: No headache, tremors, numbness, one-sided weakness    Musculoskeletal: + left leg pain.  No cramps, myalgias, joint pain, joint swelling   Integument: + left leg swelling.  No rash        Personal History:      Past Medical History:   Diagnosis Date    Allergic     Arthritis     DVT (deep venous thrombosis) 02/05/2024    GERD (gastroesophageal reflux disease)     Hyperlipidemia     Hypertension        Past Surgical History:   Procedure Laterality Date    CARDIAC CATHETERIZATION N/A 2/5/2024    Procedure: Percutaneous Mechanical Thrombectomy;  Surgeon: Enrique To MD;  Location: [facility] CATH INVASIVE LOCATION;  Service: Cardiovascular;  Laterality: N/A;    COLONOSCOPY         Family History   Problem Relation Age of Onset    Heart disease Mother     Cancer  Mother     Heart attack Mother 53    Heart disease Maternal Uncle     Heart attack Maternal Uncle     Diabetes Maternal Uncle        Social History     Tobacco Use    Smoking status: Never    Smokeless tobacco: Never   Vaping Use    Vaping Use: Never used   Substance Use Topics    Alcohol use: Yes     Comment: socially on occasion    Drug use: Never        Home meds:  Prior to Admission medications    Medication Sig Start Date End Date Taking? Authorizing Provider   allopurinol (ZYLOPRIM) 300 MG tablet Take 1 tablet by mouth Daily.   Yes Holly Guillen MD   apixaban (ELIQUIS) 5 MG tablet tablet Take 2 tablets by mouth 2 (Two) Times a Day for 6 days, THEN 1 tablet 2 (Two) Times a Day for 30 days. Indications: DVT/PE (active thrombosis) 2/6/24 3/13/24 Yes Sophia Cheng MD   Cholecalciferol (VITAMIN D-3 PO) Take 1 capsule by mouth Daily.   Yes Holly Guillen MD   cloNIDine (CATAPRES) 0.2 MG tablet Take 1 tablet by mouth 2 (Two) Times a Day.   Yes Holly Guillen MD   Cyanocobalamin (VITAMIN B 12 PO) Take 1 tablet by mouth Daily.   Yes Holly Guillen MD   divalproex (DEPAKOTE ER) 500 MG 24 hr tablet Take 1 tablet by mouth 2 (Two) Times a Day.   Yes Holly Guillen MD   hydrALAZINE (APRESOLINE) 100 MG tablet Take 1 tablet by mouth 3 (Three) Times a Day.   Yes Holly Guillen MD   hydroCHLOROthiazide (HYDRODIURIL) 25 MG tablet Take 1 tablet by mouth Daily.   Yes Holly Guillen MD   lovastatin (MEVACOR) 20 MG tablet Take 1 tablet by mouth Every Night.   Yes Holly Guillen MD   nadolol (CORGARD) 40 MG tablet Take 1 tablet by mouth Daily.   Yes Holly Guillen MD   omeprazole (priLOSEC) 20 MG capsule Take 1 capsule by mouth 2 (Two) Times a Day.   Yes Holly Guillen MD   tiZANidine (ZANAFLEX) 4 MG tablet Take 1 tablet by mouth 3 (Three) Times a Day As Needed for Muscle Spasms.   Yes Holly Guillen MD   zolpidem (AMBIEN) 10 MG tablet Take 1 tablet by  "mouth At Night As Needed for Sleep.   Yes ProviderHolly MD   acetaminophen (TYLENOL) 325 MG tablet Take 2 tablets by mouth Every 6 (Six) Hours As Needed for Mild Pain.    ProviderHolly MD   diclofenac (VOLTAREN) 50 MG EC tablet Take 1 tablet by mouth 2 (Two) Times a Day As Needed (Headache).    ProviderHolly MD   naloxone (NARCAN) 4 MG/0.1ML nasal spray Call 911. Don't prime. [place] in 1 nostril for overdose. Repeat in 2-3 minutes in other nostril if no or minimal breathing/responsiveness. 2/6/24   Sophia Cheng MD       Allergies:     Lisinopril    Scheduled Meds:allopurinol, 300 mg, Oral, Daily  apixaban, 5 mg, Oral, BID  atorvastatin, 10 mg, Oral, Daily  carvedilol, 12.5 mg, Oral, BID With Meals  cloNIDine, 0.2 mg, Oral, BID  divalproex, 500 mg, Oral, BID  hydrALAZINE, 100 mg, Oral, TID  hydroCHLOROthiazide, 25 mg, Oral, Daily  pantoprazole, 40 mg, Oral, Q AM  senna-docusate sodium, 2 tablet, Oral, BID  sodium chloride, 10 mL, Intravenous, Q12H      Continuous Infusions:   PRN Meds:  senna-docusate sodium **AND** polyethylene glycol **AND** bisacodyl **AND** bisacodyl    Morphine    nitroglycerin    [COMPLETED] Insert Peripheral IV **AND** sodium chloride    sodium chloride    sodium chloride    zolpidem    zolpidem      OBJECTIVE    Vital Signs  Vitals:    02/17/24 0845 02/17/24 0900 02/17/24 1049 02/17/24 1130   BP:   (!) 157/110    BP Location:   Right arm    Patient Position:   Lying    Pulse: 79 75 84    Resp:   14    Temp:   98.9 °F (37.2 °C)    TempSrc:   Oral    SpO2: 99% 98% 98% (!) 0%   Weight:       Height:           Flowsheet Rows      Flowsheet Row First Filed Value   Admission Height 172.7 cm (68\") Documented at 02/16/2024 1613   Admission Weight 84.1 kg (185 lb 6.5 oz) Documented at 02/16/2024 1613              Intake/Output Summary (Last 24 hours) at 2/17/2024 1245  Last data filed at 2/17/2024 1213  Gross per 24 hour   Intake 720 ml   Output --   Net 720 ml      "   Telemetry:  sinus rhythm     Physical Exam:  The patient is alert, oriented and in no distress.  Vital signs as noted above.  Head and neck revealed no carotid bruits or jugular venous distention.  No thyromegaly or lymphadenopathy is present  Lungs clear.  No wheezing.  Breath sounds are normal bilaterally.  Heart: Normal first and second heart sounds. No murmur.  No precordial rub is present.  No gallop is present.  Abdomen: Soft and nontender.  No organomegaly is present.  Extremities with good peripheral pulses with 1+ edema in LLE  Skin: Warm and dry.  Musculoskeletal system is grossly normal.  CNS grossly normal.       Results Review:  I have personally reviewed the results from the time of this admission to 2/17/2024 12:45 EST and agree with these findings:  [x]  Laboratory  []  Microbiology  [x]  Radiology  [x]  EKG/Telemetry   [x]  Cardiology/Vascular   []  Pathology  [x]  Old records  []  Other:    Most notable findings include:     Lab Results (last 24 hours)       Procedure Component Value Units Date/Time    High Sensitivity Troponin T [[id number]]  (Normal) Collected: 02/17/24 0335    Specimen: Blood from Arm, Right Updated: 02/17/24 0744     HS Troponin T 12 ng/L     Narrative:      High Sensitive Troponin T Reference Range:  <14.0 ng/L- Negative Female for AMI  <22.0 ng/L- Negative Male for AMI  >=14 - Abnormal Female indicating possible myocardial injury.  >=22 - Abnormal Male indicating possible myocardial injury.   Clinicians would have to utilize clinical acumen, EKG, Troponin, and serial changes to determine if it is an Acute Myocardial Infarction or myocardial injury due to an underlying chronic condition.         Basic Metabolic Panel [[id number]]  (Normal) Collected: 02/17/24 0335    Specimen: Blood from Arm, Right Updated: 02/17/24 0533     Glucose 92 mg/dL      BUN 13 mg/dL      Creatinine 1.21 mg/dL      Sodium 138 mmol/L      Potassium 3.5 mmol/L      Chloride 100 mmol/L      CO2 28.0  mmol/L      Calcium 9.2 mg/dL      BUN/Creatinine Ratio 10.7     Anion Gap 10.0 mmol/L      eGFR 72.0 mL/min/1.73     Narrative:      GFR Normal >60  Chronic Kidney Disease <60  Kidney Failure <15      CBC & Differential [[id number]]  (Abnormal) Collected: 02/17/24 0335    Specimen: Blood from Arm, Right Updated: 02/17/24 0516    Narrative:      The following orders were created for panel order CBC & Differential.  Procedure                               Abnormality         Status                     ---------                               -----------         ------                     CBC Auto Differential[[id number]]        Abnormal            Final result                 Please view results for these tests on the individual orders.    CBC Auto Differential [[id number]]  (Abnormal) Collected: 02/17/24 0335    Specimen: Blood from Arm, Right Updated: 02/17/24 0516     WBC 7.70 10*3/mm3      RBC 4.47 10*6/mm3      Hemoglobin 14.9 g/dL      Hematocrit 43.9 %      MCV 98.1 fL      MCH 33.3 pg      MCHC 33.9 g/dL      RDW 15.6 %      RDW-SD 56.0 fl      MPV 9.3 fL      Platelets 209 10*3/mm3      Neutrophil % 58.0 %      Lymphocyte % 28.7 %      Monocyte % 11.1 %      Eosinophil % 1.6 %      Basophil % 0.6 %      Neutrophils, Absolute 4.40 10*3/mm3      Lymphocytes, Absolute 2.20 10*3/mm3      Monocytes, Absolute 0.90 10*3/mm3      Eosinophils, Absolute 0.10 10*3/mm3      Basophils, Absolute 0.00 10*3/mm3      nRBC 0.1 /100 WBC     COVID-19 and FLU A/B PCR, 1 HR TAT - Swab, Nasopharynx [[id number]]  (Normal) Collected: 02/16/24 1808    Specimen: Swab from Nasopharynx Updated: 02/16/24 1833     COVID19 Not Detected     Influenza A PCR Not Detected     Influenza B PCR Not Detected    Narrative:      Fact sheet for providers: https://www.fda.gov/media/095386/download    Fact sheet for patients: https://www.fda.gov/media/451801/download    Test performed by PCR.    Extra Tubes [[id number]] Collected: 02/16/24 1709    Specimen:  Blood, Venous Line Updated: 02/16/24 1815    Narrative:      The following orders were created for panel order Extra Tubes.  Procedure                               Abnormality         Status                     ---------                               -----------         ------                     Gold Top - SST[[id number]]                                   Final result                 Please view results for these tests on the individual orders.    Gold Top - SST [[id number]] Collected: 02/16/24 1709    Specimen: Blood Updated: 02/16/24 1815     Extra Tube Hold for add-ons.     Comment: Auto resulted.       Comprehensive Metabolic Panel [[id number]]  (Abnormal) Collected: 02/16/24 1710    Specimen: Blood Updated: 02/16/24 1737     Glucose 89 mg/dL      BUN 12 mg/dL      Creatinine 1.33 mg/dL      Sodium 139 mmol/L      Potassium 3.6 mmol/L      Chloride 97 mmol/L      CO2 32.0 mmol/L      Calcium 10.0 mg/dL      Total Protein 7.9 g/dL      Albumin 4.7 g/dL      ALT (SGPT) 49 U/L      AST (SGOT) 44 U/L      Alkaline Phosphatase 71 U/L      Total Bilirubin 0.6 mg/dL      Globulin 3.2 gm/dL      A/G Ratio 1.5 g/dL      BUN/Creatinine Ratio 9.0     Anion Gap 10.0 mmol/L      eGFR 64.3 mL/min/1.73     Narrative:      GFR Normal >60  Chronic Kidney Disease <60  Kidney Failure <15      Single High Sensitivity Troponin T [[id number]]  (Normal) Collected: 02/16/24 1710    Specimen: Blood Updated: 02/16/24 1737     HS Troponin T 13 ng/L     Narrative:      High Sensitive Troponin T Reference Range:  <14.0 ng/L- Negative Female for AMI  <22.0 ng/L- Negative Male for AMI  >=14 - Abnormal Female indicating possible myocardial injury.  >=22 - Abnormal Male indicating possible myocardial injury.   Clinicians would have to utilize clinical acumen, EKG, Troponin, and serial changes to determine if it is an Acute Myocardial Infarction or myocardial injury due to an underlying chronic condition.         Protime-INR [[id number]]   (Abnormal) Collected: 02/16/24 1710    Specimen: Blood Updated: 02/16/24 1725     Protime 12.5 Seconds      INR 1.16    aPTT [[id number]]  (Abnormal) Collected: 02/16/24 1710    Specimen: Blood Updated: 02/16/24 1725     PTT 30.8 seconds     CBC & Differential [[id number]]  (Abnormal) Collected: 02/16/24 1709    Specimen: Blood Updated: 02/16/24 1713    Narrative:      The following orders were created for panel order CBC & Differential.  Procedure                               Abnormality         Status                     ---------                               -----------         ------                     CBC Auto Differential[[id number]]        Abnormal            Final result                 Please view results for these tests on the individual orders.    CBC Auto Differential [[id number]]  (Abnormal) Collected: 02/16/24 1709    Specimen: Blood Updated: 02/16/24 1713     WBC 8.70 10*3/mm3      RBC 4.94 10*6/mm3      Hemoglobin 16.2 g/dL      Hematocrit 48.6 %      MCV 98.3 fL      MCH 32.7 pg      MCHC 33.3 g/dL      RDW 15.5 %      RDW-SD 55.1 fl      MPV 8.7 fL      Platelets 244 10*3/mm3      Neutrophil % 53.0 %      Lymphocyte % 33.1 %      Monocyte % 11.5 %      Eosinophil % 1.7 %      Basophil % 0.7 %      Neutrophils, Absolute 4.60 10*3/mm3      Lymphocytes, Absolute 2.90 10*3/mm3      Monocytes, Absolute 1.00 10*3/mm3      Eosinophils, Absolute 0.10 10*3/mm3      Basophils, Absolute 0.10 10*3/mm3      nRBC 0.1 /100 WBC             Imaging Results (Last 24 Hours)       Procedure Component Value Units Date/Time    NM Lung Ventilation Perfusion [[id number]] Collected: 02/17/24 1139     Updated: 02/17/24 1143    Narrative:      NM LUNG VENTILATION PERFUSION    Date of Exam: 2/17/2024 10:24 AM EST    Indication: recent dvt, cta equivocal.    Comparison: CT angiogram 2/16/2024    Technique:  The patient was ventilated with 43.0 mCi of technetium 99m pyrophosphate aerosol and ventilation images were acquired in  multiple obliquities. The patient then received 5.1 mCi of technetium 99m MAA intravenously and perfusion images were   acquired in multiple obliquities.      Findings:  Ventilation images demonstrate normal homogeneous distribution of radio aerosol. Perfusion images also demonstrate normal physiologic distribution of the radiopharmaceutical. No perfusion defects identified.      Impression:      Impression:  Normal nuclear medicine ventilation perfusion lung scan        Electronically Signed: Tarun Sanz MD    2/17/2024 11:41 AM EST    Workstation ID: [id number]    CT Angiogram Chest Pulmonary Embolism [[id number]] Collected: 02/16/24 1836     Updated: 02/16/24 1841    Narrative:      CT ANGIOGRAM CHEST PULMONARY EMBOLISM    Date of Exam: 2/16/2024 6:23 PM EST    Indication: Pulmonary embolism (PE) suspected, high prob.    Comparison: None available.    Technique: Axial CT images were obtained of the chest after the uneventful intravenous administration of iodinated contrast utilizing pulmonary embolism protocol.  Sagittal and coronal reconstructions were performed.  Automated exposure control and   iterative reconstruction methods were used.      Findings:    Pulmonary arteries: There is suboptimal opacification of the pulmonary vasculature. No central pulmonary embolism visualized. Cannot evaluate the segmental and subsegmental branches.    Thoracic aorta: The thoracic aorta is normal in caliber and contour.    Thyroid: The visualized portion of the thyroid is unremarkable.    Lungs and Pleura: No focal consolidation. No pleural effusion. No suspicious pulmonary nodules. No pneumothorax.    Mediastinum/[place]: No mediastinal or hilar lymphadenopathy.    Lymph nodes: No axillary or supraclavicular lymphadenopathy.    Cardiovascular: The heart is normal in size.No evidence of pericardial effusion. Coronary calcifications are not visualized.    Upper Abdomen: No acute process in the upper abdomen. Patient is post left  nephrectomy.    Bones and Soft Tissue: No acute fracture, aggressive osseous lesions, or soft tissue process.        Impression:      Impression:    Suboptimal opacification of the pulmonary vasculature. No central pulmonary embolism visualized. Cannot evaluate the segmental and subsegmental branches. If clinical warranted, correlate with VQ scan.    No evidence of aortic aneurysm or dissection.    No acute intrathoracic process evident.        Electronically Signed: Jerson Zhou MD    2/16/2024 6:39 PM EST    Workstation ID: [id number]            LAB RESULTS (LAST 7 DAYS)    CBC  Results from last 7 days   Lab Units 02/17/24  0335 02/16/24  1709   WBC 10*3/mm3 7.70 8.70   RBC 10*6/mm3 4.47 4.94   HEMOGLOBIN g/dL 14.9 16.2   HEMATOCRIT % 43.9 48.6   MCV fL 98.1* 98.3*   PLATELETS 10*3/mm3 209 244       BMP  Results from last 7 days   Lab Units 02/17/24  0335 02/16/24  1710   SODIUM mmol/L 138 139   POTASSIUM mmol/L 3.5 3.6   CHLORIDE mmol/L 100 97*   CO2 mmol/L 28.0 32.0*   BUN mg/dL 13 12   CREATININE mg/dL 1.21 1.33*   GLUCOSE mg/dL 92 89       CMP   Results from last 7 days   Lab Units 02/17/24  0335 02/16/24  1710   SODIUM mmol/L 138 139   POTASSIUM mmol/L 3.5 3.6   CHLORIDE mmol/L 100 97*   CO2 mmol/L 28.0 32.0*   BUN mg/dL 13 12   CREATININE mg/dL 1.21 1.33*   GLUCOSE mg/dL 92 89   ALBUMIN g/dL  --  4.7   BILIRUBIN mg/dL  --  0.6   ALK PHOS U/L  --  71   AST (SGOT) U/L  --  44*   ALT (SGPT) U/L  --  49*       BNP        TROPONIN  Results from last 7 days   Lab Units 02/17/24  0335   HSTROP T ng/L 12       CoAg  Results from last 7 days   Lab Units 02/16/24  1710   INR  1.16*   APTT seconds 30.8*       Creatinine Clearance  Estimated Creatinine Clearance: 74.8 mL/min (by C-G formula based on SCr of 1.21 mg/dL).    ABG          Radiology  NM Lung Ventilation Perfusion    Result Date: 2/17/2024  Impression: Normal nuclear medicine ventilation perfusion lung scan Electronically Signed: Tarun Sanz MD  2/17/2024  11:41 AM EST  Workstation ID: [id number]    CT Angiogram Chest Pulmonary Embolism    Result Date: 2/16/2024  Impression: Suboptimal opacification of the pulmonary vasculature. No central pulmonary embolism visualized. Cannot evaluate the segmental and subsegmental branches. If clinical warranted, correlate with VQ scan. No evidence of aortic aneurysm or dissection. No acute intrathoracic process evident. Electronically Signed: Jerson Zhou MD  2/16/2024 6:39 PM EST  Workstation ID: [id number]       EKG  I personally viewed and interpreted the patient's EKG/Telemetry data:  ECG 12 Lead Chest Pain   Preliminary Result   HEART RATE= 73  bpm   RR Interval= 816  ms   WI Interval= 193  ms   P Horizontal Axis= -10  deg   P Front Axis= 30  deg   QRSD Interval= 105  ms   QT Interval= 409  ms   QTcB= 453  ms   QRS Axis= -11  deg   T Wave Axis= 19  deg   - ABNORMAL ECG -   Sinus rhythm   Left ventricular hypertrophy   Anterior infarct, old   When compared with ECG of 16-Feb-2024 16:19:21,   No significant change   Electronically Signed By:    Date and Time of Study: 2024-02-16 19:26:04      ECG 12 Lead Chest Pain   Final Result   HEART RATE= 75  bpm   RR Interval= 800  ms   WI Interval= 192  ms   P Horizontal Axis= -21  deg   P Front Axis= 27  deg   QRSD Interval= 101  ms   QT Interval= 409  ms   QTcB= 457  ms   QRS Axis= 1  deg   T Wave Axis= 36  deg   - ABNORMAL ECG -   Sinus rhythm   Anterior infarct, old   When compared with ECG of 14-Aug-2023 7:29:44,   Significant axis, voltage or hypertrophy change   Electronically Signed By: Tayo Salomon (YOEL) 17-Feb-2024 09:03:09   Date and Time of Study: 2024-02-16 16:19:21      SCANNED - TELEMETRY     Final Result      SCANNED - TELEMETRY     Final Result            Echocardiogram:    Results for orders placed during the hospital encounter of 02/05/24    Adult Transthoracic Echo Complete w/ Color, Spectral and Contrast if necessary per protocol    Interpretation Summary    Left  ventricular systolic function is normal. Calculated left ventricular EF = 56% Left ventricular ejection fraction appears to be 56 - 60%.    Left ventricular diastolic function is consistent with (grade I) impaired relaxation.  GLS -14.8%.    Estimated right ventricular systolic pressure from tricuspid regurgitation is normal (<35 mmHg).    No significant valvular abnormalities noted.        Stress Test:        Cardiac Catheterization:  Results for orders placed during the hospital encounter of 02/05/24    Cardiac Catheterization/Vascular Study    Conclusion  PROCEDURES PERFORMED  Ultrasound guided access of the left popliteal vein  Selective cath placement in the left popliteal Vein 20263  Selective cath placement in the left femoral Vein 15593  Selective cath placement in the left iliac Vein  11001  Selective cath placement in the IVC  34234  Venogram of the left popliteal Vein 75648, 08904  Venogram of the left femoral Vein 33263  Venogram of the left the Iliac Vein 01507  Venogram of the IVC 82483  Extirpation of matter from the left iliac vein, left femoral vein and left popliteal vein via mechanical thrombectomy 89566  Intravascular ultrasound noncoronary vessel left iliac vein 85246  Intravascular ultrasound noncoronary vessel, additional vessel, left femoral and popliteal veins.  46220  Transluminal balloon angioplasty of left iliac vein.  56046  Transluminal balloon angioplasty of left femoral vein 71782      PATIENT HISTORY/indication  53-year-old man with longstanding history of leg discomfort presented to the ER with new diagnosis of DVT in the entire venous system of the left leg including left iliac and left femoral veins.  After discussion of risk and benefit of the procedure he was brought to the cardiac Cath Lab for mechanical thrombectomy.      PROCEDURE IN DETAIL  After obtaining informed consent, patient was placed in prone position.  Left popliteal area was prepped and draped in standard fashion.   US guidance was used to identify left popliteal vein.  Seldinger technique was used to introduce a micropuncture sheath followed by 7 F sheath.   We first advanced the advantage glide wire to the IVC.  The tract was dilated up and the ClotTriever 13F sheath was placed.  Venograms of the left popliteal vein, left femoral vein, left iliac vein, and IVC were obtained.    FINDINGS  Ultrasound image of the right popliteal vein demonstrated thrombus and non compressible popliteal vein  Venograms:  Left popliteal Vein: Large occlusive thrombus throughout the segment. There were tandem 80 % stenosis of mid left iliac vein.  Left femoral Vein:Occlusive large thrombus in the left common femoral vein.  Left iliac Vein: Partially occlusive thrombus in the left iliac vein.  IVC: Patent    IVUS and Balloon angioplasty  A peripheral intravascular ultrasound catheter was advanced over the 0.035 advantage Glidewire and measurements were obtained.  A slow manual pullback of IVUS catheter was performed from left iliac to femoral to popliteal vein.    IVUS showed common femoral to be 12 mm in average diameter.  Left external iliac had smooth 80% stenosis throughout its length.  Proximal segment of left common femoral vein also had 70% stenosis.  Common iliac measured 15 mm in dimension.  IVUS also confirmed heavy burden of thrombus in the left external iliac vein, left femoral, popliteal veins.  Popliteal vein measured to be 8 x 10 mm in dimension.    We then advanced a 12 x 60 mm Tenaha balloon on a 135 cm shaft and performed serial dilation of left external iliac vein and left femoral vein.  Post balloon angioplasty the stenosis reduced to 20% in both femoral and iliac veins.    Venous mechanical thrombectomy  The Inari ClotTriever device was then selectively placed.  We performed mechanical thrombectomy in the IVC, left iliac and left common femoral and left popliteal vein.  We selectively placed the ClotTriever catheter into each  vein treated.  After 4 passes had been completed, large amount of clot was removed.  Completion venography confirmed complete clot removal and resolution of DVT in the popliteal, femoral, iliac veins.    Wires and sheath removed and manual pressure was applied for 15 minutes to achieve hemostasis.    ESTIMATED BLOOD LOSS  10 cc    DETAILS  Contrast:  30 cc  Sedation Time: 45 minutes    IMPRESSION  Technically successful IVUS guided balloon angioplasty of left external iliac and femoral veins.  Extirpation of matter via mechanical thrombectomy of the left lower extremity including left external iliac, femoral and popliteal veins.    PLAN  Patient to be admitted to the floor with close observation  Continue heparin drip  Transition to NOAC tonight.    Electronically signed by Enrique To MD, 02/05/24, 7:39 PM EST.        Other:      ASSESSMENT & PLAN:    Principal Problem:    Acute chest pain  Active Problems:    Essential hypertension    Mixed hyperlipidemia    History of DVT (deep vein thrombosis)      Acute chest pain  ACS ruled out with negative serial high sensitivity troponin and ECG without acute ischemia  CTA chest ruled out central pulmonary embolism  VQ scan normal  Recent echocardiogram showed normal LVEF with grade I diastolic dysfunction   Recommend nuclear stress test, but this can be done as outpatient since patient needs to wait a minimum of 48 hours after VQ scan was done.  Recommend better blood pressure control    Essential hypertension, chronic  Blood pressure uncontrolled  Continue clonidine 0.2 mg twice daily, hydralazine 100 mg three times daily, and HCTZ 25 mg daily  Discontinue nadolol and start carvedilol 12.5 mg twice daily    Mixed hyperlipidemia  Continue atorvastatin     History of DVT (deep vein thrombosis)  Status post mechanical thrombectomy  Continue Eliquis   Patient has an upcoming appointment to see hematology for hypercoagulability workup      The patient can be discharged from  cardiology standpoint. He has a follow-up appointment scheduled with his primary cardiologist, Dr. To, on Monday 2/19/24. Will obtain outpatient nuclear stress test.     Electronically signed by ANA LUISA Madrid, 02/17/24, 12:53 PM EST.

## 2024-02-19 ENCOUNTER — OFFICE VISIT (OUTPATIENT)
Dept: CARDIOLOGY | Facility: CLINIC | Age: 54
End: 2024-02-19
Payer: MEDICAID

## 2024-02-19 VITALS
OXYGEN SATURATION: 97 % | DIASTOLIC BLOOD PRESSURE: 96 MMHG | WEIGHT: 188.5 LBS | BODY MASS INDEX: 28.57 KG/M2 | HEIGHT: 68 IN | SYSTOLIC BLOOD PRESSURE: 140 MMHG | HEART RATE: 87 BPM

## 2024-02-19 DIAGNOSIS — K21.9 GASTROESOPHAGEAL REFLUX DISEASE WITHOUT ESOPHAGITIS: ICD-10-CM

## 2024-02-19 DIAGNOSIS — I10 ESSENTIAL HYPERTENSION: Primary | Chronic | ICD-10-CM

## 2024-02-19 DIAGNOSIS — E78.2 MIXED HYPERLIPIDEMIA: Chronic | ICD-10-CM

## 2024-02-19 DIAGNOSIS — R07.9 ACUTE CHEST PAIN: ICD-10-CM

## 2024-02-19 DIAGNOSIS — I82.412 ACUTE DEEP VEIN THROMBOSIS (DVT) OF FEMORAL VEIN OF LEFT LOWER EXTREMITY: ICD-10-CM

## 2024-02-19 DIAGNOSIS — E66.3 OVERWEIGHT (BMI 25.0-29.9): ICD-10-CM

## 2024-02-19 DIAGNOSIS — Q60.0 SOLITARY KIDNEY, CONGENITAL: ICD-10-CM

## 2024-02-19 PROCEDURE — 1160F RVW MEDS BY RX/DR IN RCRD: CPT | Performed by: INTERNAL MEDICINE

## 2024-02-19 PROCEDURE — 3080F DIAST BP >= 90 MM HG: CPT | Performed by: INTERNAL MEDICINE

## 2024-02-19 PROCEDURE — 99214 OFFICE O/P EST MOD 30 MIN: CPT | Performed by: INTERNAL MEDICINE

## 2024-02-19 PROCEDURE — 3077F SYST BP >= 140 MM HG: CPT | Performed by: INTERNAL MEDICINE

## 2024-02-19 PROCEDURE — 1159F MED LIST DOCD IN RCRD: CPT | Performed by: INTERNAL MEDICINE

## 2024-02-19 RX ORDER — CARVEDILOL 25 MG/1
25 TABLET ORAL 2 TIMES DAILY WITH MEALS
Qty: 180 TABLET | Refills: 3 | Status: SHIPPED | OUTPATIENT
Start: 2024-02-19 | End: 2025-02-13

## 2024-02-19 NOTE — PROGRESS NOTES
Encounter Date:02/19/2024        Patient ID: Spencer Brothers is a 53 y.o. male.      Chief Complaint:      History of Present Illness  53-year-old man with hypertension, hyperlipidemia, overweight who presented with DVT requiring mechanical thrombectomy on 2/5/2024.  He presented to the hospital again with uncontrolled hypertension and chest pain.  Today he comes in for follow-up.  He complains of left leg pain and swelling and leg exercises.  Chest pain has resolved.    The following portions of the patient's history were reviewed and updated as appropriate: allergies, current medications, past family history, past medical history, past social history, past surgical history, and problem list.    Review of Systems   Constitutional: Negative for malaise/fatigue.   Cardiovascular:  Positive for chest pain and leg swelling. Negative for dyspnea on exertion and palpitations.   Respiratory:  Negative for cough and shortness of breath.    Gastrointestinal:  Negative for abdominal pain, nausea and vomiting.   Neurological:  Negative for dizziness, focal weakness, headaches, light-headedness and numbness.   All other systems reviewed and are negative.        Current Outpatient Medications:     acetaminophen (TYLENOL) 325 MG tablet, Take 2 tablets by mouth Every 6 (Six) Hours As Needed for Mild Pain., Disp: , Rfl:     allopurinol (ZYLOPRIM) 300 MG tablet, Take 1 tablet by mouth Daily., Disp: , Rfl:     apixaban (ELIQUIS) 5 MG tablet tablet, Take 2 tablets by mouth 2 (Two) Times a Day for 6 days, THEN 1 tablet 2 (Two) Times a Day for 30 days. Indications: DVT/PE (active thrombosis), Disp: 84 tablet, Rfl: 0    carvedilol (COREG) 12.5 MG tablet, Take 1 tablet by mouth 2 (Two) Times a Day With Meals for 30 days., Disp: 60 tablet, Rfl: 0    Cholecalciferol (VITAMIN D-3 PO), Take 1 capsule by mouth Daily., Disp: , Rfl:     cloNIDine (CATAPRES) 0.2 MG tablet, Take 1 tablet by mouth 2 (Two) Times a Day., Disp: , Rfl:      Cyanocobalamin (VITAMIN B 12 PO), Take 1 tablet by mouth Daily., Disp: , Rfl:     diclofenac (VOLTAREN) 50 MG EC tablet, Take 1 tablet by mouth 2 (Two) Times a Day As Needed (Headache)., Disp: , Rfl:     divalproex (DEPAKOTE ER) 500 MG 24 hr tablet, Take 1 tablet by mouth 2 (Two) Times a Day., Disp: , Rfl:     hydrALAZINE (APRESOLINE) 100 MG tablet, Take 1 tablet by mouth 3 (Three) Times a Day., Disp: , Rfl:     hydroCHLOROthiazide (HYDRODIURIL) 25 MG tablet, Take 1 tablet by mouth Daily., Disp: , Rfl:     lovastatin (MEVACOR) 20 MG tablet, Take 1 tablet by mouth Every Night., Disp: , Rfl:     omeprazole (priLOSEC) 20 MG capsule, Take 1 capsule by mouth 2 (Two) Times a Day., Disp: , Rfl:     tiZANidine (ZANAFLEX) 4 MG tablet, Take 1 tablet by mouth 3 (Three) Times a Day As Needed for Muscle Spasms., Disp: , Rfl:     zolpidem (AMBIEN) 10 MG tablet, Take 1 tablet by mouth At Night As Needed for Sleep., Disp: , Rfl:     Current outpatient and discharge medications have been reconciled for the patient.  Reviewed by: Enrique To MD       Allergies   Allergen Reactions    Lisinopril Angioedema       Family History   Problem Relation Age of Onset    Heart disease Mother     Cancer Mother     Heart attack Mother 53    Heart disease Maternal Uncle     Heart attack Maternal Uncle     Diabetes Maternal Uncle        Past Surgical History:   Procedure Laterality Date    CARDIAC CATHETERIZATION N/A 2/5/2024    Procedure: Percutaneous Mechanical Thrombectomy;  Surgeon: Enrique To MD;  Location: [facility] INVASIVE LOC[facility];  Service: Cardiovascular;  Laterality: N/A;    COLONOSCOPY         Past Medical History:   Diagnosis Date    Allergic     Arthritis     DVT (deep venous thrombosis) 02/05/2024    GERD (gastroesophageal reflux disease)     Hyperlipidemia     Hypertension        Family History   Problem Relation Age of Onset    Heart disease Mother     Cancer Mother     Heart attack Mother 53    Heart disease Maternal Uncle   "   Heart attack Maternal Uncle     Diabetes Maternal Uncle        Social History     Socioeconomic History    Marital status:    Tobacco Use    Smoking status: Never    Smokeless tobacco: Never   Vaping Use    Vaping Use: Never used   Substance and Sexual Activity    Alcohol use: Yes     Comment: socially on occasion    Drug use: Never    Sexual activity: Defer               Objective:       Physical Exam    /96 (BP Location: Left arm, Patient Position: Sitting)   Pulse 87   Ht 172.7 cm (68\")   Wt 85.5 kg (188 lb 8 oz)   SpO2 97%   BMI 28.66 kg/m²   The patient is alert, oriented and in no distress.    Vital signs as noted above.    Head and neck revealed no carotid bruits or jugular venous distension.  No thyromegaly or lymphadenopathy is present.    Lungs clear.  No wheezing.  Breath sounds are normal bilaterally.    Heart normal first and second heart sounds.  No murmur..  No pericardial rub is present.  No gallop is present.    Abdomen soft and nontender.  No organomegaly is present.    Extremities revealed good peripheral pulses without any pedal edema.    Skin warm and dry.    Musculoskeletal system is grossly normal.    CNS grossly normal.           Diagnosis Plan   1. Essential hypertension        2. Mixed hyperlipidemia        3. Acute deep vein thrombosis (DVT) of femoral vein of left lower extremity        4. Acute chest pain        5. Gastroesophageal reflux disease without esophagitis        6. Overweight (BMI 25.0-29.9)        7. Solitary kidney, congenital        LAB RESULTS (LAST 7 DAYS)    CBC  Results from last 7 days   Lab Units 02/17/24  0335 02/16/24  1709   WBC 10*3/mm3 7.70 8.70   RBC 10*6/mm3 4.47 4.94   HEMOGLOBIN g/dL 14.9 16.2   HEMATOCRIT % 43.9 48.6   MCV fL 98.1* 98.3*   PLATELETS 10*3/mm3 209 244       BMP  Results from last 7 days   Lab Units 02/17/24  0335 02/16/24  1710   SODIUM mmol/L 138 139   POTASSIUM mmol/L 3.5 3.6   CHLORIDE mmol/L 100 97*   CO2 mmol/L 28.0 " 32.0*   BUN mg/dL 13 12   CREATININE mg/dL 1.21 1.33*   GLUCOSE mg/dL 92 89       CMP   Results from last 7 days   Lab Units 02/17/24  0335 02/16/24  1710   SODIUM mmol/L 138 139   POTASSIUM mmol/L 3.5 3.6   CHLORIDE mmol/L 100 97*   CO2 mmol/L 28.0 32.0*   BUN mg/dL 13 12   CREATININE mg/dL 1.21 1.33*   GLUCOSE mg/dL 92 89   ALBUMIN g/dL  --  4.7   BILIRUBIN mg/dL  --  0.6   ALK PHOS U/L  --  71   AST (SGOT) U/L  --  44*   ALT (SGPT) U/L  --  49*         BNP        TROPONIN  Results from last 7 days   Lab Units 02/17/24  0335   HSTROP T ng/L 12       CoAg  Results from last 7 days   Lab Units 02/16/24  1710   INR  1.16*   APTT seconds 30.8*       Creatinine Clearance  Estimated Creatinine Clearance: 76 mL/min (by C-G formula based on SCr of 1.21 mg/dL).    ABG        Radiology  No radiology results for the last day    EKG  Procedures    Stress test      Echocardiogram  Results for orders placed during the hospital encounter of 02/05/24    Adult Transthoracic Echo Complete w/ Color, Spectral and Contrast if necessary per protocol    Interpretation Summary    Left ventricular systolic function is normal. Calculated left ventricular EF = 56% Left ventricular ejection fraction appears to be 56 - 60%.    Left ventricular diastolic function is consistent with (grade I) impaired relaxation.  GLS -14.8%.    Estimated right ventricular systolic pressure from tricuspid regurgitation is normal (<35 mmHg).    No significant valvular abnormalities noted.      Cardiac catheterization  Results for orders placed during the hospital encounter of 02/05/24    Cardiac Catheterization/Vascular Study    Conclusion  PROCEDURES PERFORMED  Ultrasound guided access of the left popliteal vein  Selective cath placement in the left popliteal Vein 36755  Selective cath placement in the left femoral Vein 69560  Selective cath placement in the left iliac Vein  37741  Selective cath placement in the IVC  11839  Venogram of the left popliteal Vein  32656, 12196  Venogram of the left femoral Vein 90006  Venogram of the left the Iliac Vein 81072  Venogram of the IVC 32207  Extirpation of matter from the left iliac vein, left femoral vein and left popliteal vein via mechanical thrombectomy 02967  Intravascular ultrasound noncoronary vessel left iliac vein 06051  Intravascular ultrasound noncoronary vessel, additional vessel, left femoral and popliteal veins.  17975  Transluminal balloon angioplasty of left iliac vein.  08204  Transluminal balloon angioplasty of left femoral vein 56387      PATIENT HISTORY/indication  53-year-old man with longstanding history of leg discomfort presented to the ER with new diagnosis of DVT in the entire venous system of the left leg including left iliac and left femoral veins.  After discussion of risk and benefit of the procedure he was brought to the cardiac Cath Lab for mechanical thrombectomy.      PROCEDURE IN DETAIL  After obtaining informed consent, patient was placed in prone position.  Left popliteal area was prepped and draped in standard fashion.  US guidance was used to identify left popliteal vein.  Seldinger technique was used to introduce a micropuncture sheath followed by 7 F sheath.   We first advanced the advantage glide wire to the IVC.  The tract was dilated up and the ClotTriever 13F sheath was placed.  Venograms of the left popliteal vein, left femoral vein, left iliac vein, and IVC were obtained.    FINDINGS  Ultrasound image of the right popliteal vein demonstrated thrombus and non compressible popliteal vein  Venograms:  Left popliteal Vein: Large occlusive thrombus throughout the segment. There were tandem 80 % stenosis of mid left iliac vein.  Left femoral Vein:Occlusive large thrombus in the left common femoral vein.  Left iliac Vein: Partially occlusive thrombus in the left iliac vein.  IVC: Patent    IVUS and Balloon angioplasty  A peripheral intravascular ultrasound catheter was advanced over the  0.035 advantage Glidewire and measurements were obtained.  A slow manual pullback of IVUS catheter was performed from left iliac to femoral to popliteal vein.    IVUS showed common femoral to be 12 mm in average diameter.  Left external iliac had smooth 80% stenosis throughout its length.  Proximal segment of left common femoral vein also had 70% stenosis.  Common iliac measured 15 mm in dimension.  IVUS also confirmed heavy burden of thrombus in the left external iliac vein, left femoral, popliteal veins.  Popliteal vein measured to be 8 x 10 mm in dimension.    We then advanced a 12 x 60 mm Garrison balloon on a 135 cm shaft and performed serial dilation of left external iliac vein and left femoral vein.  Post balloon angioplasty the stenosis reduced to 20% in both femoral and iliac veins.    Venous mechanical thrombectomy  The Inari ClotTriever device was then selectively placed.  We performed mechanical thrombectomy in the IVC, left iliac and left common femoral and left popliteal vein.  We selectively placed the ClotTriever catheter into each vein treated.  After 4 passes had been completed, large amount of clot was removed.  Completion venography confirmed complete clot removal and resolution of DVT in the popliteal, femoral, iliac veins.    Wires and sheath removed and manual pressure was applied for 15 minutes to achieve hemostasis.    ESTIMATED BLOOD LOSS  10 cc    DETAILS  Contrast:  30 cc  Sedation Time: 45 minutes    IMPRESSION  Technically successful IVUS guided balloon angioplasty of left external iliac and femoral veins.  Extirpation of matter via mechanical thrombectomy of the left lower extremity including left external iliac, femoral and popliteal veins.    PLAN  Patient to be admitted to the floor with close observation  Continue heparin drip  Transition to NOAC tonight.    Electronically signed by Enrique To MD, 02/05/24, 7:39 PM EST.          Assessment and Plan       Diagnoses and all orders for  this visit:    1. Essential hypertension (Primary)    2. Mixed hyperlipidemia    3. Acute deep vein thrombosis (DVT) of femoral vein of left lower extremity    4. Acute chest pain    5. Gastroesophageal reflux disease without esophagitis  Overview:  Last Assessment & Plan:    Formatting of this note might be different from the original.   Stable on omeprazole      6. Overweight (BMI 25.0-29.9)    7. Solitary kidney, congenital         DVT  Extensive DVT of the left leg  He is status post mechanical thrombectomy on 2/5/2024  Continue Eliquis 5 mg p.o. twice daily for 6 months  Encouraged ambulation and leg exercises.  I have reassured him that his pain may improve over the next 3 to 4 weeks.     Uncontrolled hypertension  Currently on hydrochlorothiazide, hydralazine, clonidine and Coreg   I will increase the dose of Coreg today.  Nifedipine or amlodipine can be added if needed for better blood pressure control.    Chest pain  PE has been ruled out.  Echocardiogram shows preserved LV function, grade 1 diastolic dysfunction.  VQ scan is also negative.  We will proceed with nuclear stress test to be done this Friday.     Solitary kidney  Patient was born with a solitary kidney  Closely monitor renal function as he received contrast during CT scan.   Creatinine 0.95, GFR is 96.3   Potassium was corrected in the hospital     Hyperlipidemia  Continue statin  LDL 53, HDL 24, triglycerides 661 and total cholesterol 174.  Hypertriglyceridemia.  Nonfasting lipid panel  Repeat lipid panel in fasting state May need Vascepa     Gout  Continue allopurinol     Overweight  BMI is 28.66  Dietary changes and lifestyle modifications discussed with the patient.  Screening and treatment for sleep apnea also suggested

## 2024-02-19 NOTE — H&P (VIEW-ONLY)
Encounter Date:02/19/2024        Patient ID: Spencer Brothers is a 53 y.o. male.      Chief Complaint:      History of Present Illness  53-year-old man with hypertension, hyperlipidemia, overweight who presented with DVT requiring mechanical thrombectomy on 2/5/2024.  He presented to the hospital again with uncontrolled hypertension and chest pain.  Today he comes in for follow-up.  He complains of left leg pain and swelling and leg exercises.  Chest pain has resolved.    The following portions of the patient's history were reviewed and updated as appropriate: allergies, current medications, past family history, past medical history, past social history, past surgical history, and problem list.    Review of Systems   Constitutional: Negative for malaise/fatigue.   Cardiovascular:  Positive for chest pain and leg swelling. Negative for dyspnea on exertion and palpitations.   Respiratory:  Negative for cough and shortness of breath.    Gastrointestinal:  Negative for abdominal pain, nausea and vomiting.   Neurological:  Negative for dizziness, focal weakness, headaches, light-headedness and numbness.   All other systems reviewed and are negative.        Current Outpatient Medications:     acetaminophen (TYLENOL) 325 MG tablet, Take 2 tablets by mouth Every 6 (Six) Hours As Needed for Mild Pain., Disp: , Rfl:     allopurinol (ZYLOPRIM) 300 MG tablet, Take 1 tablet by mouth Daily., Disp: , Rfl:     apixaban (ELIQUIS) 5 MG tablet tablet, Take 2 tablets by mouth 2 (Two) Times a Day for 6 days, THEN 1 tablet 2 (Two) Times a Day for 30 days. Indications: DVT/PE (active thrombosis), Disp: 84 tablet, Rfl: 0    carvedilol (COREG) 12.5 MG tablet, Take 1 tablet by mouth 2 (Two) Times a Day With Meals for 30 days., Disp: 60 tablet, Rfl: 0    Cholecalciferol (VITAMIN D-3 PO), Take 1 capsule by mouth Daily., Disp: , Rfl:     cloNIDine (CATAPRES) 0.2 MG tablet, Take 1 tablet by mouth 2 (Two) Times a Day., Disp: , Rfl:      Cyanocobalamin (VITAMIN B 12 PO), Take 1 tablet by mouth Daily., Disp: , Rfl:     diclofenac (VOLTAREN) 50 MG EC tablet, Take 1 tablet by mouth 2 (Two) Times a Day As Needed (Headache)., Disp: , Rfl:     divalproex (DEPAKOTE ER) 500 MG 24 hr tablet, Take 1 tablet by mouth 2 (Two) Times a Day., Disp: , Rfl:     hydrALAZINE (APRESOLINE) 100 MG tablet, Take 1 tablet by mouth 3 (Three) Times a Day., Disp: , Rfl:     hydroCHLOROthiazide (HYDRODIURIL) 25 MG tablet, Take 1 tablet by mouth Daily., Disp: , Rfl:     lovastatin (MEVACOR) 20 MG tablet, Take 1 tablet by mouth Every Night., Disp: , Rfl:     omeprazole (priLOSEC) 20 MG capsule, Take 1 capsule by mouth 2 (Two) Times a Day., Disp: , Rfl:     tiZANidine (ZANAFLEX) 4 MG tablet, Take 1 tablet by mouth 3 (Three) Times a Day As Needed for Muscle Spasms., Disp: , Rfl:     zolpidem (AMBIEN) 10 MG tablet, Take 1 tablet by mouth At Night As Needed for Sleep., Disp: , Rfl:     Current outpatient and discharge medications have been reconciled for the patient.  Reviewed by: Enrique To MD       Allergies   Allergen Reactions    Lisinopril Angioedema       Family History   Problem Relation Age of Onset    Heart disease Mother     Cancer Mother     Heart attack Mother 53    Heart disease Maternal Uncle     Heart attack Maternal Uncle     Diabetes Maternal Uncle        Past Surgical History:   Procedure Laterality Date    CARDIAC CATHETERIZATION N/A 2/5/2024    Procedure: Percutaneous Mechanical Thrombectomy;  Surgeon: Enrique To MD;  Location: [facility] INVASIVE LOCATION;  Service: Cardiovascular;  Laterality: N/A;    COLONOSCOPY         Past Medical History:   Diagnosis Date    Allergic     Arthritis     DVT (deep venous thrombosis) 02/05/2024    GERD (gastroesophageal reflux disease)     Hyperlipidemia     Hypertension        Family History   Problem Relation Age of Onset    Heart disease Mother     Cancer Mother     Heart attack Mother 53    Heart disease Maternal Uncle   "   Heart attack Maternal Uncle     Diabetes Maternal Uncle        Social History     Socioeconomic History    Marital status:    Tobacco Use    Smoking status: Never    Smokeless tobacco: Never   Vaping Use    Vaping Use: Never used   Substance and Sexual Activity    Alcohol use: Yes     Comment: socially on occasion    Drug use: Never    Sexual activity: Defer               Objective:       Physical Exam    /96 (BP Location: Left arm, Patient Position: Sitting)   Pulse 87   Ht 172.7 cm (68\")   Wt 85.5 kg (188 lb 8 oz)   SpO2 97%   BMI 28.66 kg/m²   The patient is alert, oriented and in no distress.    Vital signs as noted above.    Head and neck revealed no carotid bruits or jugular venous distension.  No thyromegaly or lymphadenopathy is present.    Lungs clear.  No wheezing.  Breath sounds are normal bilaterally.    Heart normal first and second heart sounds.  No murmur..  No pericardial rub is present.  No gallop is present.    Abdomen soft and nontender.  No organomegaly is present.    Extremities revealed good peripheral pulses without any pedal edema.    Skin warm and dry.    Musculoskeletal system is grossly normal.    CNS grossly normal.           Diagnosis Plan   1. Essential hypertension        2. Mixed hyperlipidemia        3. Acute deep vein thrombosis (DVT) of femoral vein of left lower extremity        4. Acute chest pain        5. Gastroesophageal reflux disease without esophagitis        6. Overweight (BMI 25.0-29.9)        7. Solitary kidney, congenital        LAB RESULTS (LAST 7 DAYS)    CBC  Results from last 7 days   Lab Units 02/17/24  0335 02/16/24  1709   WBC 10*3/mm3 7.70 8.70   RBC 10*6/mm3 4.47 4.94   HEMOGLOBIN g/dL 14.9 16.2   HEMATOCRIT % 43.9 48.6   MCV fL 98.1* 98.3*   PLATELETS 10*3/mm3 209 244       BMP  Results from last 7 days   Lab Units 02/17/24  0335 02/16/24  1710   SODIUM mmol/L 138 139   POTASSIUM mmol/L 3.5 3.6   CHLORIDE mmol/L 100 97*   CO2 mmol/L 28.0 " 32.0*   BUN mg/dL 13 12   CREATININE mg/dL 1.21 1.33*   GLUCOSE mg/dL 92 89       CMP   Results from last 7 days   Lab Units 02/17/24  0335 02/16/24  1710   SODIUM mmol/L 138 139   POTASSIUM mmol/L 3.5 3.6   CHLORIDE mmol/L 100 97*   CO2 mmol/L 28.0 32.0*   BUN mg/dL 13 12   CREATININE mg/dL 1.21 1.33*   GLUCOSE mg/dL 92 89   ALBUMIN g/dL  --  4.7   BILIRUBIN mg/dL  --  0.6   ALK PHOS U/L  --  71   AST (SGOT) U/L  --  44*   ALT (SGPT) U/L  --  49*         BNP        TROPONIN  Results from last 7 days   Lab Units 02/17/24  0335   HSTROP T ng/L 12       CoAg  Results from last 7 days   Lab Units 02/16/24  1710   INR  1.16*   APTT seconds 30.8*       Creatinine Clearance  Estimated Creatinine Clearance: 76 mL/min (by C-G formula based on SCr of 1.21 mg/dL).    ABG        Radiology  No radiology results for the last day    EKG  Procedures    Stress test      Echocardiogram  Results for orders placed during the hospital encounter of 02/05/24    Adult Transthoracic Echo Complete w/ Color, Spectral and Contrast if necessary per protocol    Interpretation Summary    Left ventricular systolic function is normal. Calculated left ventricular EF = 56% Left ventricular ejection fraction appears to be 56 - 60%.    Left ventricular diastolic function is consistent with (grade I) impaired relaxation.  GLS -14.8%.    Estimated right ventricular systolic pressure from tricuspid regurgitation is normal (<35 mmHg).    No significant valvular abnormalities noted.      Cardiac catheterization  Results for orders placed during the hospital encounter of 02/05/24    Cardiac Catheterization/Vascular Study    Conclusion  PROCEDURES PERFORMED  Ultrasound guided access of the left popliteal vein  Selective cath placement in the left popliteal Vein 52488  Selective cath placement in the left femoral Vein 55134  Selective cath placement in the left iliac Vein  78007  Selective cath placement in the IVC  29258  Venogram of the left popliteal Vein  34369, 68885  Venogram of the left femoral Vein 12015  Venogram of the left the Iliac Vein 13228  Venogram of the IVC 03489  Extirpation of matter from the left iliac vein, left femoral vein and left popliteal vein via mechanical thrombectomy 12216  Intravascular ultrasound noncoronary vessel left iliac vein 95528  Intravascular ultrasound noncoronary vessel, additional vessel, left femoral and popliteal veins.  88963  Transluminal balloon angioplasty of left iliac vein.  54300  Transluminal balloon angioplasty of left femoral vein 56923      PATIENT HISTORY/indication  53-year-old man with longstanding history of leg discomfort presented to the ER with new diagnosis of DVT in the entire venous system of the left leg including left iliac and left femoral veins.  After discussion of risk and benefit of the procedure he was brought to the cardiac Cath Lab for mechanical thrombectomy.      PROCEDURE IN DETAIL  After obtaining informed consent, patient was placed in prone position.  Left popliteal area was prepped and draped in standard fashion.  US guidance was used to identify left popliteal vein.  Seldinger technique was used to introduce a micropuncture sheath followed by 7 F sheath.   We first advanced the advantage glide wire to the IVC.  The tract was dilated up and the ClotTriever 13F sheath was placed.  Venograms of the left popliteal vein, left femoral vein, left iliac vein, and IVC were obtained.    FINDINGS  Ultrasound image of the right popliteal vein demonstrated thrombus and non compressible popliteal vein  Venograms:  Left popliteal Vein: Large occlusive thrombus throughout the segment. There were tandem 80 % stenosis of mid left iliac vein.  Left femoral Vein:Occlusive large thrombus in the left common femoral vein.  Left iliac Vein: Partially occlusive thrombus in the left iliac vein.  IVC: Patent    IVUS and Balloon angioplasty  A peripheral intravascular ultrasound catheter was advanced over the  0.035 advantage Glidewire and measurements were obtained.  A slow manual pullback of IVUS catheter was performed from left iliac to femoral to popliteal vein.    IVUS showed common femoral to be 12 mm in average diameter.  Left external iliac had smooth 80% stenosis throughout its length.  Proximal segment of left common femoral vein also had 70% stenosis.  Common iliac measured 15 mm in dimension.  IVUS also confirmed heavy burden of thrombus in the left external iliac vein, left femoral, popliteal veins.  Popliteal vein measured to be 8 x 10 mm in dimension.    We then advanced a 12 x 60 mm Raymond balloon on a 135 cm shaft and performed serial dilation of left external iliac vein and left femoral vein.  Post balloon angioplasty the stenosis reduced to 20% in both femoral and iliac veins.    Venous mechanical thrombectomy  The [employer] ClotTriever device was then selectively placed.  We performed mechanical thrombectomy in the IVC, left iliac and left common femoral and left popliteal vein.  We selectively placed the ClotTriever catheter into each vein treated.  After 4 passes had been completed, large amount of clot was removed.  Completion venography confirmed complete clot removal and resolution of DVT in the popliteal, femoral, iliac veins.    Wires and sheath removed and manual pressure was applied for 15 minutes to achieve hemostasis.    ESTIMATED BLOOD LOSS  10 cc    DETAILS  Contrast:  30 cc  Sedation Time: 45 minutes    IMPRESSION  Technically successful IVUS guided balloon angioplasty of left external iliac and femoral veins.  Extirpation of matter via mechanical thrombectomy of the left lower extremity including left external iliac, femoral and popliteal veins.    PLAN  Patient to be admitted to the floor with close observation  Continue heparin drip  Transition to NOAC tonight.    Electronically signed by Enrique To MD, 02/05/24, 7:39 PM EST.          Assessment and Plan       Diagnoses and all orders for  this visit:    1. Essential hypertension (Primary)    2. Mixed hyperlipidemia    3. Acute deep vein thrombosis (DVT) of femoral vein of left lower extremity    4. Acute chest pain    5. Gastroesophageal reflux disease without esophagitis  Overview:  Last Assessment & Plan:    Formatting of this note might be different from the original.   Stable on omeprazole      6. Overweight (BMI 25.0-29.9)    7. Solitary kidney, congenital         DVT  Extensive DVT of the left leg  He is status post mechanical thrombectomy on 2/5/2024  Continue Eliquis 5 mg p.o. twice daily for 6 months  Encouraged ambulation and leg exercises.  I have reassured him that his pain may improve over the next 3 to 4 weeks.     Uncontrolled hypertension  Currently on hydrochlorothiazide, hydralazine, clonidine and Coreg   I will increase the dose of Coreg today.  Nifedipine or amlodipine can be added if needed for better blood pressure control.    Chest pain  PE has been ruled out.  Echocardiogram shows preserved LV function, grade 1 diastolic dysfunction.  VQ scan is also negative.  We will proceed with nuclear stress test to be done this Friday.     Solitary kidney  Patient was born with a solitary kidney  Closely monitor renal function as he received contrast during CT scan.   Creatinine 0.95, GFR is 96.3   Potassium was corrected in the hospital     Hyperlipidemia  Continue statin  LDL 53, HDL 24, triglycerides 661 and total cholesterol 174.  Hypertriglyceridemia.  Nonfasting lipid panel  Repeat lipid panel in fasting state May need Vascepa     Gout  Continue allopurinol     Overweight  BMI is 28.66  Dietary changes and lifestyle modifications discussed with the patient.  Screening and treatment for sleep apnea also suggested

## 2024-02-23 ENCOUNTER — HOSPITAL ENCOUNTER (OUTPATIENT)
Dept: CARDIOLOGY | Facility: HOSPITAL | Age: 54
Discharge: HOME | End: 2024-02-23
Payer: MEDICAID

## 2024-02-23 DIAGNOSIS — I51.89 GRADE I DIASTOLIC DYSFUNCTION: ICD-10-CM

## 2024-02-23 DIAGNOSIS — E78.2 MIXED HYPERLIPIDEMIA: ICD-10-CM

## 2024-02-23 DIAGNOSIS — R07.9 ACUTE CHEST PAIN: ICD-10-CM

## 2024-02-23 DIAGNOSIS — Z82.49 FAMILY HISTORY OF PREMATURE CAD: ICD-10-CM

## 2024-02-23 DIAGNOSIS — I10 ESSENTIAL HYPERTENSION: ICD-10-CM

## 2024-02-23 LAB
BH CV REST NUCLEAR ISOTOPE DOSE: 10.5 MCI
BH CV STRESS COMMENTS STAGE 1: NORMAL
BH CV STRESS DOSE REGADENOSON STAGE 1: 0.4
BH CV STRESS DURATION MIN STAGE 1: 0
BH CV STRESS DURATION SEC STAGE 1: 10
BH CV STRESS NUCLEAR ISOTOPE DOSE: 31.5 MCI
BH CV STRESS PROTOCOL 1: NORMAL
BH CV STRESS RECOVERY BP: NORMAL MMHG
BH CV STRESS RECOVERY HR: 97 BPM
BH CV STRESS STAGE 1: 1
LV EF NUC BP: 66 %
MAXIMAL PREDICTED HEART RATE: 168 BPM
STRESS BASELINE BP: NORMAL MMHG
STRESS BASELINE HR: 69 BPM
STRESS TARGET HR: 143 BPM

## 2024-02-23 PROCEDURE — 93017 CV STRESS TEST TRACING ONLY: CPT

## 2024-02-23 PROCEDURE — 78452 HT MUSCLE IMAGE SPECT MULT: CPT

## 2024-02-23 PROCEDURE — 25010000002 REGADENOSON 0.4 MG/5ML SOLUTION: Performed by: NURSE PRACTITIONER

## 2024-02-23 PROCEDURE — A9500 TC99M SESTAMIBI: HCPCS | Performed by: NURSE PRACTITIONER

## 2024-02-23 PROCEDURE — 0 TECHNETIUM SESTAMIBI: Performed by: NURSE PRACTITIONER

## 2024-02-23 RX ORDER — REGADENOSON 0.08 MG/ML
0.4 INJECTION, SOLUTION INTRAVENOUS
Status: COMPLETED | OUTPATIENT
Start: 2024-02-23 | End: 2024-02-23

## 2024-02-23 RX ADMIN — REGADENOSON 0.4 MG: 0.08 INJECTION, SOLUTION INTRAVENOUS at 13:44

## 2024-02-23 RX ADMIN — TECHNETIUM TC 99M SESTAMIBI 1 DOSE: 1 INJECTION INTRAVENOUS at 13:43

## 2024-02-23 RX ADMIN — TECHNETIUM TC 99M SESTAMIBI 1 DOSE: 1 INJECTION INTRAVENOUS at 12:15

## 2024-02-26 ENCOUNTER — TELEPHONE (OUTPATIENT)
Dept: CARDIOLOGY | Facility: CLINIC | Age: 54
End: 2024-02-26
Payer: MEDICAID

## 2024-02-26 NOTE — TELEPHONE ENCOUNTER
Please schedule a follow-up appointment with Dr. To in 2-3 weeks to discuss stress test results and recheck blood pressure.

## 2024-02-28 ENCOUNTER — HOSPITAL ENCOUNTER (OUTPATIENT)
Dept: CARDIOLOGY | Facility: HOSPITAL | Age: 54
Discharge: HOME | End: 2024-02-28
Admitting: NURSE PRACTITIONER
Payer: MEDICAID

## 2024-02-28 ENCOUNTER — TELEPHONE (OUTPATIENT)
Dept: CARDIOLOGY | Facility: CLINIC | Age: 54
End: 2024-02-28
Payer: MEDICAID

## 2024-02-28 DIAGNOSIS — R60.0 EDEMA OF LEFT LOWER EXTREMITY: ICD-10-CM

## 2024-02-28 DIAGNOSIS — I82.412 ACUTE DEEP VEIN THROMBOSIS (DVT) OF FEMORAL VEIN OF LEFT LOWER EXTREMITY: Primary | ICD-10-CM

## 2024-02-28 DIAGNOSIS — M79.605 LEFT LEG PAIN: ICD-10-CM

## 2024-02-28 DIAGNOSIS — M79.605 LEFT LEG PAIN: Primary | ICD-10-CM

## 2024-02-28 DIAGNOSIS — I82.412 ACUTE DEEP VEIN THROMBOSIS (DVT) OF FEMORAL VEIN OF LEFT LOWER EXTREMITY: ICD-10-CM

## 2024-02-28 DIAGNOSIS — I82.402 RECURRENT ACUTE DEEP VEIN THROMBOSIS (DVT) OF LEFT LOWER EXTREMITY: Primary | ICD-10-CM

## 2024-02-28 DIAGNOSIS — I82.402 RECURRENT ACUTE DEEP VEIN THROMBOSIS (DVT) OF LEFT LOWER EXTREMITY: ICD-10-CM

## 2024-02-28 LAB
BH CV LOW VAS LEFT COMMON FEMORAL SPONT: 1
BH CV LOW VAS LEFT DISTAL FEMORAL SPONT: 1
BH CV LOW VAS LEFT GASTRONEMIUS VESSEL: 1
BH CV LOW VAS LEFT MID FEMORAL SPONT: 1
BH CV LOW VAS LEFT PERONEAL VESSEL: 1
BH CV LOW VAS LEFT POPLITEAL SPONT: 1
BH CV LOW VAS LEFT PROXIMAL FEMORAL SPONT: 1
BH CV LOWER VASCULAR LEFT COMMON FEMORAL AUGMENT: NORMAL
BH CV LOWER VASCULAR LEFT COMMON FEMORAL COMPETENT: NORMAL
BH CV LOWER VASCULAR LEFT COMMON FEMORAL COMPRESS: NORMAL
BH CV LOWER VASCULAR LEFT COMMON FEMORAL PHASIC: NORMAL
BH CV LOWER VASCULAR LEFT COMMON FEMORAL SPONT: NORMAL
BH CV LOWER VASCULAR LEFT COMMON FEMORAL THROMBUS: NORMAL
BH CV LOWER VASCULAR LEFT DISTAL FEMORAL AUGMENT: NORMAL
BH CV LOWER VASCULAR LEFT DISTAL FEMORAL COMPETENT: NORMAL
BH CV LOWER VASCULAR LEFT DISTAL FEMORAL COMPRESS: NORMAL
BH CV LOWER VASCULAR LEFT DISTAL FEMORAL PHASIC: NORMAL
BH CV LOWER VASCULAR LEFT DISTAL FEMORAL SPONT: NORMAL
BH CV LOWER VASCULAR LEFT DISTAL FEMORAL THROMBUS: NORMAL
BH CV LOWER VASCULAR LEFT GASTRONEMIUS COMPRESS: NORMAL
BH CV LOWER VASCULAR LEFT GASTRONEMIUS THROMBUS: NORMAL
BH CV LOWER VASCULAR LEFT GREATER SAPH AK COMPRESS: NORMAL
BH CV LOWER VASCULAR LEFT GREATER SAPH BK COMPRESS: NORMAL
BH CV LOWER VASCULAR LEFT LESSER SAPH COMPRESS: NORMAL
BH CV LOWER VASCULAR LEFT MID FEMORAL AUGMENT: NORMAL
BH CV LOWER VASCULAR LEFT MID FEMORAL COMPETENT: NORMAL
BH CV LOWER VASCULAR LEFT MID FEMORAL COMPRESS: NORMAL
BH CV LOWER VASCULAR LEFT MID FEMORAL PHASIC: NORMAL
BH CV LOWER VASCULAR LEFT MID FEMORAL SPONT: NORMAL
BH CV LOWER VASCULAR LEFT MID FEMORAL THROMBUS: NORMAL
BH CV LOWER VASCULAR LEFT PERONEAL COMPRESS: NORMAL
BH CV LOWER VASCULAR LEFT PERONEAL THROMBUS: NORMAL
BH CV LOWER VASCULAR LEFT POPLITEAL AUGMENT: NORMAL
BH CV LOWER VASCULAR LEFT POPLITEAL COMPETENT: NORMAL
BH CV LOWER VASCULAR LEFT POPLITEAL COMPRESS: NORMAL
BH CV LOWER VASCULAR LEFT POPLITEAL PHASIC: NORMAL
BH CV LOWER VASCULAR LEFT POPLITEAL SPONT: NORMAL
BH CV LOWER VASCULAR LEFT POPLITEAL THROMBUS: NORMAL
BH CV LOWER VASCULAR LEFT POSTERIOR TIBIAL COMPRESS: NORMAL
BH CV LOWER VASCULAR LEFT PROXIMAL FEMORAL AUGMENT: NORMAL
BH CV LOWER VASCULAR LEFT PROXIMAL FEMORAL COMPETENT: NORMAL
BH CV LOWER VASCULAR LEFT PROXIMAL FEMORAL COMPRESS: NORMAL
BH CV LOWER VASCULAR LEFT PROXIMAL FEMORAL PHASIC: NORMAL
BH CV LOWER VASCULAR LEFT PROXIMAL FEMORAL SPONT: NORMAL
BH CV LOWER VASCULAR LEFT SAPHENOFEMORAL JUNCTION COMPRESS: NORMAL
BH CV LOWER VASCULAR RIGHT COMMON FEMORAL AUGMENT: NORMAL
BH CV LOWER VASCULAR RIGHT COMMON FEMORAL COMPETENT: NORMAL
BH CV LOWER VASCULAR RIGHT COMMON FEMORAL COMPRESS: NORMAL
BH CV LOWER VASCULAR RIGHT COMMON FEMORAL PHASIC: NORMAL
BH CV LOWER VASCULAR RIGHT COMMON FEMORAL SPONT: NORMAL
BH CV VAS PRELIMINARY FINDINGS SCRIPTING: 1

## 2024-02-28 PROCEDURE — 93971 EXTREMITY STUDY: CPT

## 2024-02-28 RX ORDER — ENOXAPARIN SODIUM 100 MG/ML
1 INJECTION SUBCUTANEOUS EVERY 12 HOURS SCHEDULED
Qty: 6 ML | Refills: 0 | Status: SHIPPED | OUTPATIENT
Start: 2024-02-28 | End: 2024-03-01 | Stop reason: SDUPTHER

## 2024-02-28 RX ORDER — HYDROCODONE BITARTRATE AND ACETAMINOPHEN 5; 325 MG/1; MG/1
1 TABLET ORAL EVERY 6 HOURS PRN
Qty: 40 TABLET | Refills: 0 | Status: SHIPPED | OUTPATIENT
Start: 2024-02-28

## 2024-02-28 RX ORDER — WARFARIN SODIUM 5 MG/1
5 TABLET ORAL
Qty: 30 TABLET | Refills: 0 | Status: SHIPPED | OUTPATIENT
Start: 2024-02-28

## 2024-02-28 NOTE — TELEPHONE ENCOUNTER
I returned call to patient and he is C/O his left leg swelling from where a thrombectomy was performed by Dr. To on 2/5/24.  Patient saw Dr. To at  on 2/19/24 and stated he is ambulating and doing leg exercises as Dr. To suggested.   Patient stated initially after his surgery that he only noticed LE swelling usually after the end of the day when he had been up on his feet and it would go away once he was in bed at night, but patient stated he has LE swelling/pain most of the day now-even when in bed.  Patient stated his left leg feels warm to touch and he is able to feel the pulses in his left feet-states NO problems with his right foot.  Confirmed patient is taking:  Eliquis 5mg bid  Coreg 25mg bid  Clonidine 0.2mg bid  Hydralazine 100mg TID  Hydrochlorothiazide 25mg qd  Lovastatin 20mg qd    V/S  B/P while on the phone: 167/106  2/27/24  138/90  Said his HR average is mid 90's.  Patient stated he uses a wrist cuff at home-advised patient get an upper arm cuff for more accurate readings.    Informed patient that I would notify Dr. To/ANA LUISA [place] but advised that patient call 911/go to the ER if his S/S worsen.  Patient verbalizes understanding.    Please advise,    Thanks Leena

## 2024-02-28 NOTE — PROGRESS NOTES
HEMATOLOGY ONCOLOGY OUTPATIENT CONSULTATION       Patient name: Spencer Brothers  : 1970  MRN: [id number]  Primary Care Physician: Kaleigh Strickland APRN  Referring Physician: Mery Penaloza APRN  Reason For Consult: Deep vein thrombosis.    History of Present Illness:  Patient is a 53 y.o.     3/1/2024: For the first time in the office referred from the hospital.  He presented there after many months of left lower extremity edema.  This had started maybe even years before with some injury to the knee.  Apparently he has at least 1 meniscal tear.  Because of that he slowly became less active and at some point he could no longer do his job as a .  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  He presented to the hospital in 2024.  The pain and the edema of the lower extremity had become very severe and he could no longer tolerate it. He was sent to the hospital and underwent a Duplex ultrasound of the extremity that involved the common femoral, the proximal femoral, the mid femoral and distal femoral, as well as the popliteal, the peroneal and the gastrocnemius veins. He underwent a mechanical thrombectomy and was discharged on warfarin and enoxaparin. He is feeling somewhat better. He is scheduled for another thrombectomy in the next few days. He has been eating well. No chest pain at this point but in the hospital he complained of chest pain. No abdominal pain or diarrhea and no dysuria. Social history and family history were reviewed. On exam alert and conversant. No jaundice. No oral lesions. Lungs clear and heart regular. Abdomen soft and not tender. There is left lower extremity edema. Reviewed all records from the hospital. Reviewed all the laboratory exams. Discussed with him. To continue the same anticoagulation and to follow in the warfarin clinic. He will need indefinite  anticoagulation given the unprovoked nature of his thrombosis.     Past Medical History:   Diagnosis Date    Allergic     Arthritis     DVT (deep venous thrombosis) 02/05/2024    GERD (gastroesophageal reflux disease)     Hyperlipidemia     Hypertension      Past Surgical History:   Procedure Laterality Date    CARDIAC CATHETERIZATION N/A 2/5/2024    Procedure: Percutaneous Mechanical Thrombectomy;  Surgeon: Enrique To MD;  Location: [facility] CATH INVASIVE LOCATION;  Service: Cardiovascular;  Laterality: N/A;    COLONOSCOPY         Current Outpatient Medications:     acetaminophen (TYLENOL) 325 MG tablet, Take 2 tablets by mouth Every 6 (Six) Hours As Needed for Mild Pain., Disp: , Rfl:     allopurinol (ZYLOPRIM) 300 MG tablet, Take 1 tablet by mouth Daily., Disp: , Rfl:     carvedilol (COREG) 25 MG tablet, Take 1 tablet by mouth 2 (Two) Times a Day With Meals for 360 days., Disp: 180 tablet, Rfl: 3    Cholecalciferol (VITAMIN D-3 PO), Take 1 capsule by mouth Daily., Disp: , Rfl:     cloNIDine (CATAPRES) 0.2 MG tablet, Take 1 tablet by mouth 2 (Two) Times a Day., Disp: , Rfl:     Cyanocobalamin (VITAMIN B 12 PO), Take 1 tablet by mouth Daily., Disp: , Rfl:     diclofenac (VOLTAREN) 50 MG EC tablet, Take 1 tablet by mouth 2 (Two) Times a Day As Needed (Headache)., Disp: , Rfl:     divalproex (DEPAKOTE ER) 500 MG 24 hr tablet, Take 1 tablet by mouth 2 (Two) Times a Day., Disp: , Rfl:     Enoxaparin Sodium (LOVENOX) 100 MG/ML solution prefilled syringe syringe, Inject 0.9 mL under the skin into the appropriate area as directed Every 12 (Twelve) Hours. Indications: DVT/PE (active thrombosis), Disp: 6 mL, Rfl: 0    hydrALAZINE (APRESOLINE) 100 MG tablet, Take 1 tablet by mouth 3 (Three) Times a Day., Disp: , Rfl:     hydroCHLOROthiazide (HYDRODIURIL) 25 MG tablet, Take 1 tablet by mouth Daily., Disp: , Rfl:     HYDROcodone-acetaminophen (Norco) 5-325 MG per tablet, Take 1 tablet by mouth Every 6 (Six) Hours As Needed  for Moderate Pain., Disp: 40 tablet, Rfl: 0    lovastatin (MEVACOR) 20 MG tablet, Take 1 tablet by mouth Every Night., Disp: , Rfl:     omeprazole (priLOSEC) 20 MG capsule, Take 1 capsule by mouth 2 (Two) Times a Day., Disp: , Rfl:     tiZANidine (ZANAFLEX) 4 MG tablet, Take 1 tablet by mouth 3 (Three) Times a Day As Needed for Muscle Spasms., Disp: , Rfl:     warfarin (COUMADIN) 5 MG tablet, Take 1 tablet by mouth Daily. Indications: DVT/PE (active thrombosis), Disp: 30 tablet, Rfl: 0    zolpidem (AMBIEN) 10 MG tablet, Take 1 tablet by mouth At Night As Needed for Sleep., Disp: , Rfl:     amLODIPine (NORVASC) 10 MG tablet, Take 1 tablet by mouth Daily., Disp: , Rfl:     Allergies   Allergen Reactions    Lisinopril Angioedema     Family History   Problem Relation Age of Onset    Heart disease Mother     Heart attack Mother 53    Breast cancer Mother 50    Heart disease Maternal Uncle     Heart attack Maternal Uncle     Diabetes Maternal Uncle      Cancer-related family history includes Breast cancer (age of onset: 50) in his mother.    Social History     Tobacco Use    Smoking status: Never    Smokeless tobacco: Never   Vaping Use    Vaping Use: Never used   Substance Use Topics    Alcohol use: Yes     Comment: Occasional    Drug use: Never     Social History     Social History Narrative    Not on file     ROS:   Review of Systems   Constitutional:  Negative for activity change, appetite change, chills, diaphoresis, fatigue, fever and unexpected weight change.   HENT:  Negative for congestion, dental problem, drooling, ear discharge, ear pain, facial swelling, hearing loss, mouth sores, nosebleeds, postnasal drip, rhinorrhea, sinus pressure, sinus pain, sneezing, sore throat, tinnitus, trouble swallowing and voice change.    Eyes:  Negative for photophobia, pain, discharge, redness, itching and visual disturbance.   Respiratory:  Negative for apnea, cough, choking, chest tightness, shortness of breath, wheezing and  "stridor.    Cardiovascular:  Negative for chest pain, palpitations and leg swelling.   Gastrointestinal:  Negative for abdominal distention, abdominal pain, anal bleeding, blood in stool, constipation, diarrhea, nausea, rectal pain and vomiting.   Endocrine: Negative for cold intolerance, heat intolerance, polydipsia and polyuria.   Genitourinary:  Negative for decreased urine volume, difficulty urinating, dysuria, flank pain, frequency, genital sores, hematuria and urgency.   Musculoskeletal:  Positive for arthralgias, gait problem and myalgias. Negative for back pain, joint swelling, neck pain and neck stiffness.   Skin:  Negative for color change, pallor and rash.   Neurological:  Negative for dizziness, tremors, seizures, syncope, facial asymmetry, speech difficulty, weakness, light-headedness, numbness and headaches.   Hematological:  Negative for adenopathy. Does not bruise/bleed easily.   Psychiatric/Behavioral:  Negative for agitation, behavioral problems, confusion, decreased concentration, hallucinations, self-injury, sleep disturbance and suicidal ideas. The patient is not nervous/anxious.      Objective:    Vital Signs:  Vitals:    03/01/24 0836   BP: 149/98   Pulse: 81   Temp: 98 °F (36.7 °C)   TempSrc: Oral   SpO2: 95%   Weight: 88.3 kg (194 lb 9.6 oz)   Height: 172.7 cm (68\")   PainSc:   3   PainLoc: Leg  Comment: left leg calf area     Body mass index is 29.59 kg/m².    ECOG  (1) Restricted in physically strenuous activity, ambulatory and able to do work of light nature    Physical Exam:   Physical Exam  Constitutional:       General: He is not in acute distress.     Appearance: He is not ill-appearing, toxic-appearing or diaphoretic.   HENT:      Head: Normocephalic and atraumatic.      Right Ear: External ear normal.      Left Ear: External ear normal.      Nose: Nose normal.      Mouth/Throat:      Mouth: Mucous membranes are moist.      Pharynx: Oropharynx is clear.   Eyes:      General: No " scleral icterus.        Right eye: No discharge.         Left eye: No discharge.      Conjunctiva/sclera: Conjunctivae normal.      Pupils: Pupils are equal, round, and reactive to light.   Cardiovascular:      Rate and Rhythm: Normal rate and regular rhythm.      Pulses: Normal pulses.      Heart sounds: Normal heart sounds. No murmur heard.     No friction rub. No gallop.   Pulmonary:      Effort: No respiratory distress.      Breath sounds: No stridor. No wheezing, rhonchi or rales.   Chest:      Chest wall: No tenderness.   Abdominal:      General: Abdomen is flat. Bowel sounds are normal. There is no distension.      Palpations: Abdomen is soft. There is no mass.      Tenderness: There is no abdominal tenderness. There is no right CVA tenderness, left CVA tenderness, guarding or rebound.   Musculoskeletal:         General: No swelling, tenderness, deformity or signs of injury.      Cervical back: No rigidity.      Right lower leg: No edema.      Left lower leg: No edema.   Lymphadenopathy:      Cervical: No cervical adenopathy.   Skin:     General: Skin is warm and dry.      Coloration: Skin is not jaundiced.      Findings: No bruising or rash.   Neurological:      General: No focal deficit present.      Mental Status: He is alert and oriented to person, place, and time.      Gait: Gait normal.   Psychiatric:         Mood and Affect: Mood normal.         Behavior: Behavior normal.         Thought Content: Thought content normal.         Judgment: Judgment normal.     MARYJANE Ramírez MD performed the physical exam on 3/1/2024 as documented above.     Lab Results - Last 18 Months   Lab Units 03/01/24  0840 02/17/24  0335 02/16/24  1709   WBC 10*3/mm3 7.56 7.70 8.70   HEMOGLOBIN g/dL 15.5 14.9 16.2   HEMATOCRIT % 46.8 43.9 48.6   PLATELETS 10*3/mm3 173 209 244   MCV fL 102.4* 98.1* 98.3*     Lab Results - Last 18 Months   Lab Units 03/01/24  0840 02/17/24  0335 02/16/24  1710 08/15/23  1054 08/14/23  2317    SODIUM mmol/L 141 138 139   < > 137   POTASSIUM mmol/L 4.0 3.5 3.6   < > 3.5   CHLORIDE mmol/L 100 100 97*   < > 99   CO2 mmol/L 29.0 28.0 32.0*   < > 26.0   BUN mg/dL 13 13 12   < > 14   CREATININE mg/dL 1.16 1.21 1.33*   < > 1.30*   CALCIUM mg/dL 9.4 9.2 10.0   < > 9.1   BILIRUBIN mg/dL 0.3  --  0.6  --  0.4   ALK PHOS U/L 59  --  71  --  63   ALT (SGPT) U/L 31  --  49*  --  16   AST (SGOT) U/L 35  --  44*  --  20   GLUCOSE mg/dL 90 92 89   < > 99    < > = values in this interval not displayed.     Lab Results   Component Value Date    GLUCOSE 90 03/01/2024    BUN 13 03/01/2024    CREATININE 1.16 03/01/2024    BCR 11.2 03/01/2024    K 4.0 03/01/2024    CO2 29.0 03/01/2024    CALCIUM 9.4 03/01/2024    ALBUMIN 4.3 03/01/2024    LABIL2 1.6 02/09/2022    AST 35 03/01/2024    ALT 31 03/01/2024     Lab Results - Last 18 Months   Lab Units 03/01/24  0840 02/16/24  1710 02/06/24  0641 02/05/24  1409   INR  1.20* 1.16*  --  1.12*   APTT seconds  --  30.8* 26.9* 25.6*     Lab Results   Component Value Date    FOLATE >20.0 06/08/2020     Lab Results   Component Value Date    MKHDZOAY96 1,416 (H) 08/14/2023     Lab Results   Component Value Date    KATHY Negative 08/14/2023    SEDRATE 9 08/14/2023     Lab Results   Component Value Date    PTT 30.8 (L) 02/16/2024    INR 1.20 (L) 03/01/2024     Lab Results   Component Value Date    PSA 1.79 08/05/2021     Lab Results   Component Value Date    SEDRATE 9 08/14/2023      Assessment & Plan     Unprovoked left lower extremity deep vein thrombosis status post mechanical thrombectomy. On anticoagulation with warfarin and enoxaparin. To follow in the warfarin clinic. INR today. At this time no additional investigations.   To follow with cardiology for repeat procedure as needed.   Reviewed the records from the hospital. Discussed with him the result of the imaging studies, as well as all laboratory exams.   See me in 4 weeks. Consider additional testing in the future.     Brayan  Desiree COTTRELL on 3/1/2024 at 13:09

## 2024-02-28 NOTE — TELEPHONE ENCOUNTER
He should continue taking Eliquis and antihypertensives as prescribed.  Dr. To would like to repeat an ultrasound of the left lower extremity. I have ordered it STAT and will get him scheduled.

## 2024-02-28 NOTE — TELEPHONE ENCOUNTER
I placed a call to patient regarding his U/S of LLE-patient stated he spoke with ANA LUISA Madrid and  is aware of ultrasound and leaving home now to have procedure done.

## 2024-02-28 NOTE — TELEPHONE ENCOUNTER
The patient has a recurrent acute DVT in the left lower extremity. Called and spoke to the patient. Reviewed results of venous duplex. He reports compliance with Eliquis and denies missing any doses. He will be switched to warfarin with a Lovenox bridge. He is also requesting pain medication. New prescriptions sent to the [facility] pharmacy in [place]. He was advised to follow up with hematology as scheduled on 3/1/24 for further recommendations. He has a follow up scheduled with Dr. To on 3/18/24. Patient verbalized understanding and is agreeable to this plan.     Please arrange INR checks in our office. Goal INR 2.0-3.0. Dr. To to manage warfarin.     Electronically signed by ANA LUISA Madrid, 02/28/24, 2:14 PM EST.

## 2024-02-29 ENCOUNTER — TELEPHONE (OUTPATIENT)
Dept: CARDIOLOGY | Facility: CLINIC | Age: 54
End: 2024-02-29
Payer: MEDICAID

## 2024-02-29 DIAGNOSIS — I82.412 ACUTE DEEP VEIN THROMBOSIS (DVT) OF FEMORAL VEIN OF LEFT LOWER EXTREMITY: Primary | ICD-10-CM

## 2024-02-29 NOTE — TELEPHONE ENCOUNTER
Called spoke with Vane pt wife and Tammy at [facility]. Pt will have lab work drawn tomorrow morning to check his INR with his other hematology labs. We will manage result apLPN

## 2024-03-01 ENCOUNTER — CONSULT (OUTPATIENT)
Dept: ONCOLOGY | Facility: CLINIC | Age: 54
End: 2024-03-01
Payer: MEDICAID

## 2024-03-01 ENCOUNTER — LAB (OUTPATIENT)
Dept: LAB | Facility: HOSPITAL | Age: 54
End: 2024-03-01
Payer: MEDICAID

## 2024-03-01 ENCOUNTER — TELEPHONE (OUTPATIENT)
Dept: PHARMACY | Facility: HOSPITAL | Age: 54
End: 2024-03-01
Payer: MEDICAID

## 2024-03-01 ENCOUNTER — ANTICOAGULATION VISIT (OUTPATIENT)
Dept: CARDIOLOGY | Facility: CLINIC | Age: 54
End: 2024-03-01
Payer: MEDICAID

## 2024-03-01 VITALS
DIASTOLIC BLOOD PRESSURE: 98 MMHG | WEIGHT: 194.6 LBS | HEART RATE: 81 BPM | BODY MASS INDEX: 29.49 KG/M2 | TEMPERATURE: 98 F | SYSTOLIC BLOOD PRESSURE: 149 MMHG | OXYGEN SATURATION: 95 % | HEIGHT: 68 IN

## 2024-03-01 DIAGNOSIS — I82.412 ACUTE DEEP VEIN THROMBOSIS (DVT) OF FEMORAL VEIN OF LEFT LOWER EXTREMITY: Primary | ICD-10-CM

## 2024-03-01 DIAGNOSIS — I82.4Y2 ACUTE DEEP VEIN THROMBOSIS (DVT) OF PROXIMAL VEIN OF LEFT LOWER EXTREMITY: Primary | ICD-10-CM

## 2024-03-01 DIAGNOSIS — Z79.01 LONG TERM (CURRENT) USE OF ANTICOAGULANTS: ICD-10-CM

## 2024-03-01 DIAGNOSIS — I82.4Y2 ACUTE DEEP VEIN THROMBOSIS (DVT) OF PROXIMAL VEIN OF LEFT LOWER EXTREMITY: ICD-10-CM

## 2024-03-01 LAB
ALBUMIN SERPL-MCNC: 4.3 G/DL (ref 3.5–5.2)
ALBUMIN/GLOB SERPL: 1.5 G/DL
ALP SERPL-CCNC: 59 U/L (ref 39–117)
ALT SERPL W P-5'-P-CCNC: 31 U/L (ref 1–41)
ANION GAP SERPL CALCULATED.3IONS-SCNC: 12 MMOL/L (ref 5–15)
AST SERPL-CCNC: 35 U/L (ref 1–40)
BASOPHILS # BLD AUTO: 0.05 10*3/MM3 (ref 0–0.2)
BASOPHILS NFR BLD AUTO: 0.7 % (ref 0–1.5)
BILIRUB SERPL-MCNC: 0.3 MG/DL (ref 0–1.2)
BUN SERPL-MCNC: 13 MG/DL (ref 6–20)
BUN/CREAT SERPL: 11.2 (ref 7–25)
CALCIUM SPEC-SCNC: 9.4 MG/DL (ref 8.6–10.5)
CHLORIDE SERPL-SCNC: 100 MMOL/L (ref 98–107)
CO2 SERPL-SCNC: 29 MMOL/L (ref 22–29)
CREAT SERPL-MCNC: 1.16 MG/DL (ref 0.76–1.27)
DEPRECATED RDW RBC AUTO: 51.4 FL (ref 37–54)
EGFRCR SERPLBLD CKD-EPI 2021: 75.8 ML/MIN/1.73
EOSINOPHIL # BLD AUTO: 0.15 10*3/MM3 (ref 0–0.4)
EOSINOPHIL NFR BLD AUTO: 2 % (ref 0.3–6.2)
ERYTHROCYTE [DISTWIDTH] IN BLOOD BY AUTOMATED COUNT: 13.9 % (ref 12.3–15.4)
GLOBULIN UR ELPH-MCNC: 2.8 GM/DL
GLUCOSE SERPL-MCNC: 90 MG/DL (ref 65–99)
HCT VFR BLD AUTO: 46.8 % (ref 37.5–51)
HGB BLD-MCNC: 15.5 G/DL (ref 13–17.7)
HOLD SPECIMEN: NORMAL
HOLD SPECIMEN: NORMAL
INR PPP: 1.2 (ref 2–3)
LYMPHOCYTES # BLD AUTO: 2.07 10*3/MM3 (ref 0.7–3.1)
LYMPHOCYTES NFR BLD AUTO: 27.4 % (ref 19.6–45.3)
MCH RBC QN AUTO: 33.9 PG (ref 26.6–33)
MCHC RBC AUTO-ENTMCNC: 33.1 G/DL (ref 31.5–35.7)
MCV RBC AUTO: 102.4 FL (ref 79–97)
MONOCYTES # BLD AUTO: 0.92 10*3/MM3 (ref 0.1–0.9)
MONOCYTES NFR BLD AUTO: 12.2 % (ref 5–12)
NEUTROPHILS NFR BLD AUTO: 4.37 10*3/MM3 (ref 1.7–7)
NEUTROPHILS NFR BLD AUTO: 57.7 % (ref 42.7–76)
PLATELET # BLD AUTO: 173 10*3/MM3 (ref 140–450)
PMV BLD AUTO: 10.1 FL (ref 6–12)
POTASSIUM SERPL-SCNC: 4 MMOL/L (ref 3.5–5.2)
PROT SERPL-MCNC: 7.1 G/DL (ref 6–8.5)
PROTHROMBIN TIME: 12.9 SECONDS (ref 19.4–28.5)
RBC # BLD AUTO: 4.57 10*6/MM3 (ref 4.14–5.8)
SODIUM SERPL-SCNC: 141 MMOL/L (ref 136–145)
WBC NRBC COR # BLD AUTO: 7.56 10*3/MM3 (ref 3.4–10.8)
WHOLE BLOOD HOLD COAG: NORMAL

## 2024-03-01 PROCEDURE — 36415 COLL VENOUS BLD VENIPUNCTURE: CPT

## 2024-03-01 PROCEDURE — 80053 COMPREHEN METABOLIC PANEL: CPT | Performed by: INTERNAL MEDICINE

## 2024-03-01 PROCEDURE — 85610 PROTHROMBIN TIME: CPT | Performed by: NURSE PRACTITIONER

## 2024-03-01 PROCEDURE — 85025 COMPLETE CBC W/AUTO DIFF WBC: CPT

## 2024-03-01 RX ORDER — ENOXAPARIN SODIUM 100 MG/ML
1 INJECTION SUBCUTANEOUS EVERY 12 HOURS SCHEDULED
Qty: 10 ML | Refills: 0 | Status: SHIPPED | OUTPATIENT
Start: 2024-03-01

## 2024-03-01 RX ORDER — AMLODIPINE BESYLATE 10 MG/1
1 TABLET ORAL DAILY
COMMUNITY

## 2024-03-01 NOTE — TELEPHONE ENCOUNTER
Contacted Dr. To's office regarding INR monitoring. Referral from Dr. Ramírez for anticoagulation monitoring placed today but per chart review, it appears Dr. To was planning to manage. Per silvia Sena close referral from Dr. Ramírez and their office will contact patient regarding INR from today and manage.

## 2024-03-01 NOTE — PROGRESS NOTES
3/1/24 spoke with pt advised resume lovenox shots. Take 7.5mg of warfarin today tomorrow then 5mg on Sunday and recheck INR on Monday pt verbalizes understanding       Pt only has 4 lovenox left. #10 sent to pharmacy so pt does not run out

## 2024-03-04 ENCOUNTER — TELEPHONE (OUTPATIENT)
Dept: CARDIOLOGY | Facility: CLINIC | Age: 54
End: 2024-03-04
Payer: MEDICAID

## 2024-03-04 ENCOUNTER — ANTICOAGULATION VISIT (OUTPATIENT)
Dept: CARDIOLOGY | Facility: CLINIC | Age: 54
End: 2024-03-04
Payer: MEDICAID

## 2024-03-04 VITALS
SYSTOLIC BLOOD PRESSURE: 155 MMHG | DIASTOLIC BLOOD PRESSURE: 98 MMHG | BODY MASS INDEX: 29.5 KG/M2 | HEART RATE: 92 BPM | WEIGHT: 194 LBS

## 2024-03-04 DIAGNOSIS — Z79.01 LONG TERM (CURRENT) USE OF ANTICOAGULANTS: ICD-10-CM

## 2024-03-04 DIAGNOSIS — I82.412 ACUTE DEEP VEIN THROMBOSIS (DVT) OF FEMORAL VEIN OF LEFT LOWER EXTREMITY: Primary | ICD-10-CM

## 2024-03-04 LAB — INR PPP: 2.9 (ref 0.9–1.1)

## 2024-03-04 PROCEDURE — 85610 PROTHROMBIN TIME: CPT | Performed by: NURSE PRACTITIONER

## 2024-03-04 PROCEDURE — 36416 COLLJ CAPILLARY BLOOD SPEC: CPT | Performed by: NURSE PRACTITIONER

## 2024-03-04 NOTE — TELEPHONE ENCOUNTER
Pt was in for INR today. He has been taking Lovenox until his INR was therapeutic. INR today was 2.9, advised him to discontinue Lovenox. He is alternating 5 mgs with 7.5 mgs until his thrombectomy on Fri 3/8/24. Please advise pt if there are any other orders prior to upcoming procedure. Thanks Shay

## 2024-03-04 NOTE — PROGRESS NOTES
Pt was changed from Xarelto to Warfarin d/t history of DVT. Pt was currently on Lovenox and Warfarin until he reaches a therapeutic level. He was 2.9 today. Pt advised to d/c Lovenox. Pt is scheduled for thrombectomy on Fri. Shay

## 2024-03-06 ENCOUNTER — PATIENT ROUNDING (BHMG ONLY) (OUTPATIENT)
Dept: ONCOLOGY | Facility: CLINIC | Age: 54
End: 2024-03-06
Payer: MEDICAID

## 2024-03-06 NOTE — PROGRESS NOTES
March 6, 2024    Hello, may I speak with Spencer Brothers?    My name is Chloe Ledezma      I am  with [facility]  [address] [place] [address] [phone].    Before we get started may I verify your date of birth? 1970    I am calling to officially welcome you to our practice and ask about your recent visit. Is this a good time to talk? no    Tell me about your visit with us. What things went well?  A My Chart message was sent to the patient.         We're always looking for ways to make our patients' experiences even better. Do you have recommendations on ways we may improve?  no    Overall were you satisfied with your first visit to our practice? yes       I appreciate you taking the time to speak with me today. Is there anything else I can do for you? no      Thank you, and have a great day.

## 2024-03-08 ENCOUNTER — HOSPITAL ENCOUNTER (OUTPATIENT)
Facility: HOSPITAL | Age: 54
Setting detail: HOSPITAL OUTPATIENT SURGERY
Discharge: HOME | End: 2024-03-08
Attending: INTERNAL MEDICINE | Admitting: INTERNAL MEDICINE
Payer: MEDICAID

## 2024-03-08 ENCOUNTER — APPOINTMENT (OUTPATIENT)
Dept: CARDIOLOGY | Facility: HOSPITAL | Age: 54
End: 2024-03-08
Payer: MEDICAID

## 2024-03-08 VITALS
OXYGEN SATURATION: 100 % | SYSTOLIC BLOOD PRESSURE: 120 MMHG | TEMPERATURE: 97.7 F | WEIGHT: 192.68 LBS | BODY MASS INDEX: 29.2 KG/M2 | HEIGHT: 68 IN | DIASTOLIC BLOOD PRESSURE: 74 MMHG | RESPIRATION RATE: 14 BRPM | HEART RATE: 85 BPM

## 2024-03-08 DIAGNOSIS — I82.402 RECURRENT ACUTE DEEP VEIN THROMBOSIS (DVT) OF LEFT LOWER EXTREMITY: ICD-10-CM

## 2024-03-08 DIAGNOSIS — R60.0 EDEMA OF LEFT LOWER EXTREMITY: ICD-10-CM

## 2024-03-08 DIAGNOSIS — M79.605 LEFT LEG PAIN: ICD-10-CM

## 2024-03-08 LAB
BH CV LOW VAS LEFT DISTAL FEMORAL SPONT: 1
BH CV LOW VAS LEFT GASTRONEMIUS VESSEL: 1
BH CV LOW VAS LEFT MID FEMORAL SPONT: 1
BH CV LOW VAS LEFT POPLITEAL SPONT: 1
BH CV LOW VAS LEFT PROXIMAL FEMORAL SPONT: 1
BH CV LOWER VASCULAR LEFT COMMON FEMORAL AUGMENT: NORMAL
BH CV LOWER VASCULAR LEFT COMMON FEMORAL COMPETENT: NORMAL
BH CV LOWER VASCULAR LEFT COMMON FEMORAL COMPRESS: NORMAL
BH CV LOWER VASCULAR LEFT COMMON FEMORAL PHASIC: NORMAL
BH CV LOWER VASCULAR LEFT COMMON FEMORAL SPONT: NORMAL
BH CV LOWER VASCULAR LEFT DISTAL FEMORAL AUGMENT: NORMAL
BH CV LOWER VASCULAR LEFT DISTAL FEMORAL COMPETENT: NORMAL
BH CV LOWER VASCULAR LEFT DISTAL FEMORAL COMPRESS: NORMAL
BH CV LOWER VASCULAR LEFT DISTAL FEMORAL PHASIC: NORMAL
BH CV LOWER VASCULAR LEFT DISTAL FEMORAL SPONT: NORMAL
BH CV LOWER VASCULAR LEFT DISTAL FEMORAL THROMBUS: NORMAL
BH CV LOWER VASCULAR LEFT GASTRONEMIUS COMPRESS: NORMAL
BH CV LOWER VASCULAR LEFT GASTRONEMIUS THROMBUS: NORMAL
BH CV LOWER VASCULAR LEFT GREATER SAPH AK COMPRESS: NORMAL
BH CV LOWER VASCULAR LEFT GREATER SAPH BK COMPRESS: NORMAL
BH CV LOWER VASCULAR LEFT LESSER SAPH COMPRESS: NORMAL
BH CV LOWER VASCULAR LEFT MID FEMORAL AUGMENT: NORMAL
BH CV LOWER VASCULAR LEFT MID FEMORAL COMPETENT: NORMAL
BH CV LOWER VASCULAR LEFT MID FEMORAL COMPRESS: NORMAL
BH CV LOWER VASCULAR LEFT MID FEMORAL PHASIC: NORMAL
BH CV LOWER VASCULAR LEFT MID FEMORAL SPONT: NORMAL
BH CV LOWER VASCULAR LEFT MID FEMORAL THROMBUS: NORMAL
BH CV LOWER VASCULAR LEFT PERONEAL COMPRESS: NORMAL
BH CV LOWER VASCULAR LEFT POPLITEAL AUGMENT: NORMAL
BH CV LOWER VASCULAR LEFT POPLITEAL COMPETENT: NORMAL
BH CV LOWER VASCULAR LEFT POPLITEAL COMPRESS: NORMAL
BH CV LOWER VASCULAR LEFT POPLITEAL PHASIC: NORMAL
BH CV LOWER VASCULAR LEFT POPLITEAL SPONT: NORMAL
BH CV LOWER VASCULAR LEFT POPLITEAL THROMBUS: NORMAL
BH CV LOWER VASCULAR LEFT POSTERIOR TIBIAL COMPRESS: NORMAL
BH CV LOWER VASCULAR LEFT PROXIMAL FEMORAL AUGMENT: NORMAL
BH CV LOWER VASCULAR LEFT PROXIMAL FEMORAL COMPETENT: NORMAL
BH CV LOWER VASCULAR LEFT PROXIMAL FEMORAL COMPRESS: NORMAL
BH CV LOWER VASCULAR LEFT PROXIMAL FEMORAL PHASIC: NORMAL
BH CV LOWER VASCULAR LEFT PROXIMAL FEMORAL SPONT: NORMAL
BH CV LOWER VASCULAR LEFT PROXIMAL FEMORAL THROMBUS: NORMAL
BH CV LOWER VASCULAR LEFT SAPHENOFEMORAL JUNCTION COMPRESS: NORMAL
BH CV LOWER VASCULAR RIGHT COMMON FEMORAL AUGMENT: NORMAL
BH CV LOWER VASCULAR RIGHT COMMON FEMORAL COMPETENT: NORMAL
BH CV LOWER VASCULAR RIGHT COMMON FEMORAL COMPRESS: NORMAL
BH CV LOWER VASCULAR RIGHT COMMON FEMORAL PHASIC: NORMAL
BH CV LOWER VASCULAR RIGHT COMMON FEMORAL SPONT: NORMAL

## 2024-03-08 PROCEDURE — 99152 MOD SED SAME PHYS/QHP 5/>YRS: CPT | Performed by: INTERNAL MEDICINE

## 2024-03-08 PROCEDURE — 37187 VENOUS MECH THROMBECTOMY: CPT | Performed by: INTERNAL MEDICINE

## 2024-03-08 PROCEDURE — 25010000002 LIDOCAINE 1 % SOLUTION: Performed by: INTERNAL MEDICINE

## 2024-03-08 PROCEDURE — 99153 MOD SED SAME PHYS/QHP EA: CPT | Performed by: INTERNAL MEDICINE

## 2024-03-08 PROCEDURE — C1769 GUIDE WIRE: HCPCS | Performed by: INTERNAL MEDICINE

## 2024-03-08 PROCEDURE — C1887 CATHETER, GUIDING: HCPCS | Performed by: INTERNAL MEDICINE

## 2024-03-08 PROCEDURE — 25010000002 MIDAZOLAM PER 1 MG: Performed by: INTERNAL MEDICINE

## 2024-03-08 PROCEDURE — C1894 INTRO/SHEATH, NON-LASER: HCPCS | Performed by: INTERNAL MEDICINE

## 2024-03-08 PROCEDURE — 25510000001 IOPAMIDOL PER 1 ML: Performed by: INTERNAL MEDICINE

## 2024-03-08 PROCEDURE — 25010000002 FENTANYL CITRATE (PF) 50 MCG/ML SOLUTION: Performed by: INTERNAL MEDICINE

## 2024-03-08 PROCEDURE — 93971 EXTREMITY STUDY: CPT

## 2024-03-08 PROCEDURE — 36005 INJECTION EXT VENOGRAPHY: CPT | Performed by: INTERNAL MEDICINE

## 2024-03-08 RX ORDER — ONDANSETRON 2 MG/ML
4 INJECTION INTRAMUSCULAR; INTRAVENOUS EVERY 6 HOURS PRN
Status: DISCONTINUED | OUTPATIENT
Start: 2024-03-08 | End: 2024-03-08 | Stop reason: HOSPADM

## 2024-03-08 RX ORDER — FENTANYL CITRATE 50 UG/ML
INJECTION, SOLUTION INTRAMUSCULAR; INTRAVENOUS
Status: DISCONTINUED | OUTPATIENT
Start: 2024-03-08 | End: 2024-03-08 | Stop reason: HOSPADM

## 2024-03-08 RX ORDER — NITROGLYCERIN 0.4 MG/1
0.4 TABLET SUBLINGUAL
Status: DISCONTINUED | OUTPATIENT
Start: 2024-03-08 | End: 2024-03-08 | Stop reason: HOSPADM

## 2024-03-08 RX ORDER — HYDROCODONE BITARTRATE AND ACETAMINOPHEN 5; 325 MG/1; MG/1
1 TABLET ORAL EVERY 6 HOURS PRN
Qty: 30 TABLET | Refills: 0 | Status: SHIPPED | OUTPATIENT
Start: 2024-03-08 | End: 2024-03-18

## 2024-03-08 RX ORDER — LIDOCAINE HYDROCHLORIDE 10 MG/ML
INJECTION, SOLUTION INFILTRATION; PERINEURAL
Status: DISCONTINUED | OUTPATIENT
Start: 2024-03-08 | End: 2024-03-08 | Stop reason: HOSPADM

## 2024-03-08 RX ORDER — MIDAZOLAM HYDROCHLORIDE 1 MG/ML
INJECTION INTRAMUSCULAR; INTRAVENOUS
Status: DISCONTINUED | OUTPATIENT
Start: 2024-03-08 | End: 2024-03-08 | Stop reason: HOSPADM

## 2024-03-08 RX ORDER — ONDANSETRON 4 MG/1
4 TABLET, ORALLY DISINTEGRATING ORAL EVERY 6 HOURS PRN
Status: DISCONTINUED | OUTPATIENT
Start: 2024-03-08 | End: 2024-03-08 | Stop reason: HOSPADM

## 2024-03-08 RX ORDER — DIPHENHYDRAMINE HCL 25 MG
25 CAPSULE ORAL EVERY 6 HOURS PRN
Status: DISCONTINUED | OUTPATIENT
Start: 2024-03-08 | End: 2024-03-08 | Stop reason: HOSPADM

## 2024-03-08 RX ORDER — ACETAMINOPHEN 325 MG/1
650 TABLET ORAL EVERY 4 HOURS PRN
Status: DISCONTINUED | OUTPATIENT
Start: 2024-03-08 | End: 2024-03-08 | Stop reason: HOSPADM

## 2024-03-08 NOTE — Clinical Note
Manual pressure applied to vessel. Manual pressure was held by Ha. Hemostasis achieved successfully. Closure device additional comment: Left popliteal

## 2024-03-08 NOTE — Clinical Note
Manual pressure applied to vessel. Manual pressure was held by jadyn. Hemostasis achieved successfully.

## 2024-03-08 NOTE — INTERVAL H&P NOTE
H&P reviewed. The patient was examined and there are no changes to the H&P.      Proceed with attempts to perform mechanical thrombectomy for chronically occluded left lower extremity veins due to significant disabling symptoms.

## 2024-03-08 NOTE — Clinical Note
Prepped with: ChloraPrep. The site was clipped. The patient was draped in a sterile fashion. Left knee popliteal

## 2024-03-08 NOTE — Clinical Note
A 7 fr sheath was  inserted using micropuncture technique with ultrasound guidance into the left popliteal vein.

## 2024-03-16 NOTE — PROGRESS NOTES
Encounter Date:03/18/2024        Patient ID: Spencer Brothers is a 53 y.o. male.      Chief Complaint:      History of Present Illness  53-year-old man with hypertension, hyperlipidemia, overweight who presented with DVT requiring mechanical thrombectomy on 2/5/2024.  He presented to the hospital again with uncontrolled hypertension and chest pain.  Today he comes in for follow-up.  He is currently on amlodipine, Coreg, clonidine, hydralazine, hydrochlorothiazide and warfarin.  His INR is supratherapeutic.   He complains of persistent chest pain/tightness with exertion.  He is curious to know the results of stress testing.      The following portions of the patient's history were reviewed and updated as appropriate: allergies, current medications, past family history, past medical history, past social history, past surgical history, and problem list.    Review of Systems   Constitutional: Negative for malaise/fatigue.   Cardiovascular:  Positive for palpitations. Negative for chest pain, dyspnea on exertion and leg swelling.   Respiratory:  Negative for cough and shortness of breath.    Gastrointestinal:  Negative for abdominal pain, nausea and vomiting.   Neurological:  Negative for dizziness, focal weakness, headaches, light-headedness and numbness.   All other systems reviewed and are negative.        Current Outpatient Medications:     acetaminophen (TYLENOL) 325 MG tablet, Take 2 tablets by mouth Every 6 (Six) Hours As Needed for Mild Pain., Disp: , Rfl:     allopurinol (ZYLOPRIM) 300 MG tablet, Take 1 tablet by mouth Daily., Disp: , Rfl:     amLODIPine (NORVASC) 10 MG tablet, Take 1 tablet by mouth Daily., Disp: , Rfl:     carvedilol (COREG) 25 MG tablet, Take 1 tablet by mouth 2 (Two) Times a Day With Meals for 360 days., Disp: 180 tablet, Rfl: 3    Cholecalciferol (VITAMIN D-3 PO), Take 1 capsule by mouth Daily., Disp: , Rfl:     cloNIDine (CATAPRES) 0.2 MG tablet, Take 1 tablet by mouth 2 (Two) Times a  Day., Disp: , Rfl:     Cyanocobalamin (VITAMIN B 12 PO), Take 1 tablet by mouth Daily., Disp: , Rfl:     diclofenac (VOLTAREN) 50 MG EC tablet, Take 1 tablet by mouth 2 (Two) Times a Day As Needed (Headache)., Disp: , Rfl:     divalproex (DEPAKOTE ER) 500 MG 24 hr tablet, Take 1 tablet by mouth 2 (Two) Times a Day., Disp: , Rfl:     hydrALAZINE (APRESOLINE) 100 MG tablet, Take 1 tablet by mouth 3 (Three) Times a Day., Disp: , Rfl:     hydroCHLOROthiazide (HYDRODIURIL) 25 MG tablet, Take 1 tablet by mouth Daily., Disp: , Rfl:     lovastatin (MEVACOR) 20 MG tablet, Take 1 tablet by mouth Every Night., Disp: , Rfl:     omeprazole (priLOSEC) 20 MG capsule, Take 1 capsule by mouth 2 (Two) Times a Day., Disp: , Rfl:     Testosterone Cypionate (DEPOTESTOTERONE CYPIONATE) 200 MG/ML injection, Inject 0.5 mL into the appropriate muscle as directed by prescriber Every 14 (Fourteen) Days., Disp: , Rfl:     tiZANidine (ZANAFLEX) 4 MG tablet, Take 1 tablet by mouth 3 (Three) Times a Day As Needed for Muscle Spasms., Disp: , Rfl:     warfarin (COUMADIN) 5 MG tablet, Take 1 tablet by mouth Daily. Indications: DVT/PE (active thrombosis), Disp: 30 tablet, Rfl: 0    zolpidem (AMBIEN) 10 MG tablet, Take 1 tablet by mouth At Night As Needed for Sleep., Disp: , Rfl:     Current outpatient and discharge medications have been reconciled for the patient.  Reviewed by: Enrique To MD       Allergies   Allergen Reactions    Lisinopril Angioedema       Family History   Problem Relation Age of Onset    Heart disease Mother     Heart attack Mother 53    Breast cancer Mother 50    Heart disease Maternal Uncle     Heart attack Maternal Uncle     Diabetes Maternal Uncle        Past Surgical History:   Procedure Laterality Date    CARDIAC CATHETERIZATION N/A 2/5/2024    Procedure: Percutaneous Mechanical Thrombectomy;  Surgeon: Enrique To MD;  Location: [facility] CATH INVASIVE LOCATION;  Service: Cardiovascular;  Laterality: N/A;    CARDIAC  "CATHETERIZATION N/A 3/8/2024    Procedure: Percutaneous Mechanical Thrombectomy;  Surgeon: Enrique To MD;  Location: [facility] CATH INVASIVE LOCATION;  Service: Cardiovascular;  Laterality: N/A;    COLONOSCOPY         Past Medical History:   Diagnosis Date    Allergic     Arthritis     DVT (deep venous thrombosis) 02/05/2024    GERD (gastroesophageal reflux disease)     Hyperlipidemia     Hypertension        Family History   Problem Relation Age of Onset    Heart disease Mother     Heart attack Mother 53    Breast cancer Mother 50    Heart disease Maternal Uncle     Heart attack Maternal Uncle     Diabetes Maternal Uncle        Social History     Socioeconomic History    Marital status:    Tobacco Use    Smoking status: Never    Smokeless tobacco: Never   Vaping Use    Vaping status: Never Used   Substance and Sexual Activity    Alcohol use: Yes     Comment: Occasional    Drug use: Never    Sexual activity: Defer               Objective:       Physical Exam    /96 (BP Location: Left arm, Patient Position: Sitting)   Pulse 77   Ht 172.7 cm (68\")   Wt 86.2 kg (190 lb)   SpO2 98%   BMI 28.89 kg/m²   The patient is alert, oriented and in no distress.    Vital signs as noted above.    Head and neck revealed no carotid bruits or jugular venous distension.  No thyromegaly or lymphadenopathy is present.    Lungs clear.  No wheezing.  Breath sounds are normal bilaterally.    Heart normal first and second heart sounds.  No murmur..  No pericardial rub is present.  No gallop is present.    Abdomen soft and nontender.  No organomegaly is present.    Extremities revealed good peripheral pulses without any pedal edema.    Skin warm and dry.    Musculoskeletal system is grossly normal.    CNS grossly normal.           Diagnosis Plan   1. Acute deep vein thrombosis (DVT) of femoral vein of left lower extremity        2. Long term (current) use of anticoagulants        3. Recurrent acute deep vein thrombosis (DVT) " of left lower extremity        4. Edema of left lower extremity        5. Left leg pain        6. Acute chest pain        7. Family history of premature CAD        8. Essential hypertension        9. Mixed hyperlipidemia        10. Grade I diastolic dysfunction        11. Gastroesophageal reflux disease without esophagitis        12. Overweight (BMI 25.0-29.9)        13. Solitary kidney, congenital        LAB RESULTS (LAST 7 DAYS)    CBC        BMP        CMP         BNP        TROPONIN        CoAg  Results from last 7 days   Lab Units 03/18/24  1322   INR  5.70*       Creatinine Clearance  Estimated Creatinine Clearance: 79.5 mL/min (by C-G formula based on SCr of 1.16 mg/dL).    ABG        Radiology  No radiology results for the last day    EKG  Procedures    Stress test  Results for orders placed during the hospital encounter of 02/23/24    Stress Test With Myocardial Perfusion One Day    Interpretation Summary    Findings consistent with a normal ECG stress test.    Left ventricular ejection fraction is normal (Calculated EF = 66%).    Myocardial perfusion imaging indicates a moderate-sized, severe area of ischemia located in the inferior wall.    Impressions are consistent with an intermediate risk study.      Echocardiogram  Results for orders placed during the hospital encounter of 02/05/24    Adult Transthoracic Echo Complete w/ Color, Spectral and Contrast if necessary per protocol    Interpretation Summary    Left ventricular systolic function is normal. Calculated left ventricular EF = 56% Left ventricular ejection fraction appears to be 56 - 60%.    Left ventricular diastolic function is consistent with (grade I) impaired relaxation.  GLS -14.8%.    Estimated right ventricular systolic pressure from tricuspid regurgitation is normal (<35 mmHg).    No significant valvular abnormalities noted.      Cardiac catheterization  Results for orders placed during the hospital encounter of 03/08/24    Cardiac  Catheterization/Vascular Study    Conclusion  PROCEDURES PERFORMED  Ultrasound guided access of the left popliteal vein  Selective cath placement in the left popliteal Vein 69587  Aborted mechanical thrombectomy  Moderate sedation 35 minutes    PATIENT HISTORY/indication  53 years old man with persistent and chronic left femoral, popliteal DVT he presented to the hospital with disabling symptoms of pain and discomfort in the left leg.  After much discussion regarding risk and benefit of the procedure he was brought in for mechanical thrombectomy    PROCEDURE IN DETAIL  After obtaining informed consent, patient was placed in prone position.  Left popliteal area was prepped and draped in standard fashion.  US guidance was used to identify left popliteal vein. Seldinger technique was used to introduce a micropuncture sheath followed by 7 F sheath.  I made several attempts to cross the chronically occluded vein with 035 J-tip wire, advantage Glidewire, Baird cross catheter.  After having spent > 30 minutes we decided to abort any further attempts.  7 [place] sheath was removed and manual pressure applied to achieve hemostasis.    FINDINGS  Ultrasound image of the left popliteal vein demonstrated thrombus and non compressible vein    ESTIMATED BLOOD LOSS  2 cc    DETAILS  Contrast: 5 cc  Sedation Time: 35 minutes    IMPRESSION  Aborted attempt for mechanical thrombectomy.    PLAN  Continue warfarin with goal INR of 2.5-3  Physical therapy    Electronically signed by Enrique To MD, 03/08/24, 3:46 PM EST.          Assessment and Plan       Diagnoses and all orders for this visit:    1. Acute deep vein thrombosis (DVT) of femoral vein of left lower extremity (Primary)    2. Long term (current) use of anticoagulants    3. Recurrent acute deep vein thrombosis (DVT) of left lower extremity    4. Edema of left lower extremity    5. Left leg pain    6. Acute chest pain    7. Family history of premature CAD    8. Essential  hypertension    9. Mixed hyperlipidemia    10. Grade I diastolic dysfunction    11. Gastroesophageal reflux disease without esophagitis  Overview:  Last Assessment & Plan:    Formatting of this note might be different from the original.   Stable on omeprazole      12. Overweight (BMI 25.0-29.9)    13. Solitary kidney, congenital         DVT  Extensive DVT of the left leg  He is status post partially successful mechanical thrombectomy on 2/5/2024  Repeat attempted mechanical thrombectomy was aborted in March 2024  Switched from Eliquis to warfarin: Goal INR 3.  INR today is 5.7.  Encouraged ambulation and leg exercises.  I have reassured him that his pain may improve over the next 3 to 4 weeks.     Uncontrolled hypertension  Currently on amlodipine, hydrochlorothiazide, hydralazine, clonidine and Coreg   Blood pressure has improved significantly.     Chest pain  PE has been ruled out.  Echocardiogram shows preserved LV function, grade 1 diastolic dysfunction.  VQ scan is also negative.  Nuclear stress test is abnormal with ischemia noted in the inferior wall.  He has multiple cardiovascular risk factors and due to ongoing exertional chest pain despite blood pressure control, I have offered him a cardiac catheterization.  Procedure was described to him in details and risk and benefits were also discussed.  Will proceed with right radial coronary angiogram.  Warfarin will not be interrupted due to recent extensive DVT.  Continue warfarin, beta-blocker and statin     Solitary kidney  Patient was born with a solitary kidney  Closely monitor renal function as he received contrast during CT scan.   Creatinine 0.95, GFR is 96.3   Potassium was corrected in the hospital     Hyperlipidemia  Continue statin  LDL 53, HDL 24, triglycerides 661 and total cholesterol 174.  Hypertriglyceridemia.  Nonfasting lipid panel  Repeat lipid panel in fasting state, May need Vascepa     Gout  Continue allopurinol     Overweight  BMI is  28.89. Weight is 190lbs.  Dietary changes and lifestyle modifications discussed with the patient.  Screening and treatment for sleep apnea also suggested

## 2024-03-16 NOTE — H&P (VIEW-ONLY)
Encounter Date:03/18/2024        Patient ID: Spencer Brothers is a 53 y.o. male.      Chief Complaint:      History of Present Illness  53-year-old man with hypertension, hyperlipidemia, overweight who presented with DVT requiring mechanical thrombectomy on 2/5/2024.  He presented to the hospital again with uncontrolled hypertension and chest pain.  Today he comes in for follow-up.  He is currently on amlodipine, Coreg, clonidine, hydralazine, hydrochlorothiazide and warfarin.  His INR is supratherapeutic.   He complains of persistent chest pain/tightness with exertion.  He is curious to know the results of stress testing.      The following portions of the patient's history were reviewed and updated as appropriate: allergies, current medications, past family history, past medical history, past social history, past surgical history, and problem list.    Review of Systems   Constitutional: Negative for malaise/fatigue.   Cardiovascular:  Positive for palpitations. Negative for chest pain, dyspnea on exertion and leg swelling.   Respiratory:  Negative for cough and shortness of breath.    Gastrointestinal:  Negative for abdominal pain, nausea and vomiting.   Neurological:  Negative for dizziness, focal weakness, headaches, light-headedness and numbness.   All other systems reviewed and are negative.        Current Outpatient Medications:     acetaminophen (TYLENOL) 325 MG tablet, Take 2 tablets by mouth Every 6 (Six) Hours As Needed for Mild Pain., Disp: , Rfl:     allopurinol (ZYLOPRIM) 300 MG tablet, Take 1 tablet by mouth Daily., Disp: , Rfl:     amLODIPine (NORVASC) 10 MG tablet, Take 1 tablet by mouth Daily., Disp: , Rfl:     carvedilol (COREG) 25 MG tablet, Take 1 tablet by mouth 2 (Two) Times a Day With Meals for 360 days., Disp: 180 tablet, Rfl: 3    Cholecalciferol (VITAMIN D-3 PO), Take 1 capsule by mouth Daily., Disp: , Rfl:     cloNIDine (CATAPRES) 0.2 MG tablet, Take 1 tablet by mouth 2 (Two) Times a  Day., Disp: , Rfl:     Cyanocobalamin (VITAMIN B 12 PO), Take 1 tablet by mouth Daily., Disp: , Rfl:     diclofenac (VOLTAREN) 50 MG EC tablet, Take 1 tablet by mouth 2 (Two) Times a Day As Needed (Headache)., Disp: , Rfl:     divalproex (DEPAKOTE ER) 500 MG 24 hr tablet, Take 1 tablet by mouth 2 (Two) Times a Day., Disp: , Rfl:     hydrALAZINE (APRESOLINE) 100 MG tablet, Take 1 tablet by mouth 3 (Three) Times a Day., Disp: , Rfl:     hydroCHLOROthiazide (HYDRODIURIL) 25 MG tablet, Take 1 tablet by mouth Daily., Disp: , Rfl:     lovastatin (MEVACOR) 20 MG tablet, Take 1 tablet by mouth Every Night., Disp: , Rfl:     omeprazole (priLOSEC) 20 MG capsule, Take 1 capsule by mouth 2 (Two) Times a Day., Disp: , Rfl:     Testosterone Cypionate (DEPOTESTOTERONE CYPIONATE) 200 MG/ML injection, Inject 0.5 mL into the appropriate muscle as directed by prescriber Every 14 (Fourteen) Days., Disp: , Rfl:     tiZANidine (ZANAFLEX) 4 MG tablet, Take 1 tablet by mouth 3 (Three) Times a Day As Needed for Muscle Spasms., Disp: , Rfl:     warfarin (COUMADIN) 5 MG tablet, Take 1 tablet by mouth Daily. Indications: DVT/PE (active thrombosis), Disp: 30 tablet, Rfl: 0    zolpidem (AMBIEN) 10 MG tablet, Take 1 tablet by mouth At Night As Needed for Sleep., Disp: , Rfl:     Current outpatient and discharge medications have been reconciled for the patient.  Reviewed by: Enrique To MD       Allergies   Allergen Reactions    Lisinopril Angioedema       Family History   Problem Relation Age of Onset    Heart disease Mother     Heart attack Mother 53    Breast cancer Mother 50    Heart disease Maternal Uncle     Heart attack Maternal Uncle     Diabetes Maternal Uncle        Past Surgical History:   Procedure Laterality Date    CARDIAC CATHETERIZATION N/A 2/5/2024    Procedure: Percutaneous Mechanical Thrombectomy;  Surgeon: Enrique To MD;  Location: [facility] CATH INVASIVE LOCATION;  Service: Cardiovascular;  Laterality: N/A;    CARDIAC  "CATHETERIZATION N/A 3/8/2024    Procedure: Percutaneous Mechanical Thrombectomy;  Surgeon: Enrique To MD;  Location: [facility] CATH INVASIVE LOCATION;  Service: Cardiovascular;  Laterality: N/A;    COLONOSCOPY         Past Medical History:   Diagnosis Date    Allergic     Arthritis     DVT (deep venous thrombosis) 02/05/2024    GERD (gastroesophageal reflux disease)     Hyperlipidemia     Hypertension        Family History   Problem Relation Age of Onset    Heart disease Mother     Heart attack Mother 53    Breast cancer Mother 50    Heart disease Maternal Uncle     Heart attack Maternal Uncle     Diabetes Maternal Uncle        Social History     Socioeconomic History    Marital status:    Tobacco Use    Smoking status: Never    Smokeless tobacco: Never   Vaping Use    Vaping status: Never Used   Substance and Sexual Activity    Alcohol use: Yes     Comment: Occasional    Drug use: Never    Sexual activity: Defer               Objective:       Physical Exam    /96 (BP Location: Left arm, Patient Position: Sitting)   Pulse 77   Ht 172.7 cm (68\")   Wt 86.2 kg (190 lb)   SpO2 98%   BMI 28.89 kg/m²   The patient is alert, oriented and in no distress.    Vital signs as noted above.    Head and neck revealed no carotid bruits or jugular venous distension.  No thyromegaly or lymphadenopathy is present.    Lungs clear.  No wheezing.  Breath sounds are normal bilaterally.    Heart normal first and second heart sounds.  No murmur..  No pericardial rub is present.  No gallop is present.    Abdomen soft and nontender.  No organomegaly is present.    Extremities revealed good peripheral pulses without any pedal edema.    Skin warm and dry.    Musculoskeletal system is grossly normal.    CNS grossly normal.           Diagnosis Plan   1. Acute deep vein thrombosis (DVT) of femoral vein of left lower extremity        2. Long term (current) use of anticoagulants        3. Recurrent acute deep vein thrombosis (DVT) " of left lower extremity        4. Edema of left lower extremity        5. Left leg pain        6. Acute chest pain        7. Family history of premature CAD        8. Essential hypertension        9. Mixed hyperlipidemia        10. Grade I diastolic dysfunction        11. Gastroesophageal reflux disease without esophagitis        12. Overweight (BMI 25.0-29.9)        13. Solitary kidney, congenital        LAB RESULTS (LAST 7 DAYS)    CBC        BMP        CMP         BNP        TROPONIN        CoAg  Results from last 7 days   Lab Units 03/18/24  1322   INR  5.70*       Creatinine Clearance  Estimated Creatinine Clearance: 79.5 mL/min (by C-G formula based on SCr of 1.16 mg/dL).    ABG        Radiology  No radiology results for the last day    EKG  Procedures    Stress test  Results for orders placed during the hospital encounter of 02/23/24    Stress Test With Myocardial Perfusion One Day    Interpretation Summary    Findings consistent with a normal ECG stress test.    Left ventricular ejection fraction is normal (Calculated EF = 66%).    Myocardial perfusion imaging indicates a moderate-sized, severe area of ischemia located in the inferior wall.    Impressions are consistent with an intermediate risk study.      Echocardiogram  Results for orders placed during the hospital encounter of 02/05/24    Adult Transthoracic Echo Complete w/ Color, Spectral and Contrast if necessary per protocol    Interpretation Summary    Left ventricular systolic function is normal. Calculated left ventricular EF = 56% Left ventricular ejection fraction appears to be 56 - 60%.    Left ventricular diastolic function is consistent with (grade I) impaired relaxation.  GLS -14.8%.    Estimated right ventricular systolic pressure from tricuspid regurgitation is normal (<35 mmHg).    No significant valvular abnormalities noted.      Cardiac catheterization  Results for orders placed during the hospital encounter of 03/08/24    Cardiac  Catheterization/Vascular Study    Conclusion  PROCEDURES PERFORMED  Ultrasound guided access of the left popliteal vein  Selective cath placement in the left popliteal Vein [id number]  Aborted mechanical thrombectomy  Moderate sedation 35 minutes    PATIENT HISTORY/indication  53 years old man with persistent and chronic left femoral, popliteal DVT he presented to the hospital with disabling symptoms of pain and discomfort in the left leg.  After much discussion regarding risk and benefit of the procedure he was brought in for mechanical thrombectomy    PROCEDURE IN DETAIL  After obtaining informed consent, patient was placed in prone position.  Left popliteal area was prepped and draped in standard fashion.  US guidance was used to identify left popliteal vein. Seldinger technique was used to introduce a micropuncture sheath followed by 7 F sheath.  I made several attempts to cross the chronically occluded vein with 035 J-tip wire, advantage Glidewire, Baird cross catheter.  After having spent > 30 minutes we decided to abort any further attempts.  7 [place] sheath was removed and manual pressure applied to achieve hemostasis.    FINDINGS  Ultrasound image of the left popliteal vein demonstrated thrombus and non compressible vein    ESTIMATED BLOOD LOSS  2 cc    DETAILS  Contrast: 5 cc  Sedation Time: 35 minutes    IMPRESSION  Aborted attempt for mechanical thrombectomy.    PLAN  Continue warfarin with goal INR of 2.5-3  Physical therapy    Electronically signed by Enrique To MD, 03/08/24, 3:46 PM EST.          Assessment and Plan       Diagnoses and all orders for this visit:    1. Acute deep vein thrombosis (DVT) of femoral vein of left lower extremity (Primary)    2. Long term (current) use of anticoagulants    3. Recurrent acute deep vein thrombosis (DVT) of left lower extremity    4. Edema of left lower extremity    5. Left leg pain    6. Acute chest pain    7. Family history of premature CAD    8. Essential  hypertension    9. Mixed hyperlipidemia    10. Grade I diastolic dysfunction    11. Gastroesophageal reflux disease without esophagitis  Overview:  Last Assessment & Plan:    Formatting of this note might be different from the original.   Stable on omeprazole      12. Overweight (BMI 25.0-29.9)    13. Solitary kidney, congenital         DVT  Extensive DVT of the left leg  He is status post partially successful mechanical thrombectomy on 2/5/2024  Repeat attempted mechanical thrombectomy was aborted in March 2024  Switched from Eliquis to warfarin: Goal INR 3.  INR today is 5.7.  Encouraged ambulation and leg exercises.  I have reassured him that his pain may improve over the next 3 to 4 weeks.     Uncontrolled hypertension  Currently on amlodipine, hydrochlorothiazide, hydralazine, clonidine and Coreg   Blood pressure has improved significantly.     Chest pain  PE has been ruled out.  Echocardiogram shows preserved LV function, grade 1 diastolic dysfunction.  VQ scan is also negative.  Nuclear stress test is abnormal with ischemia noted in the inferior wall.  He has multiple cardiovascular risk factors and due to ongoing exertional chest pain despite blood pressure control, I have offered him a cardiac catheterization.  Procedure was described to him in details and risk and benefits were also discussed.  Will proceed with right radial coronary angiogram.  Warfarin will not be interrupted due to recent extensive DVT.  Continue warfarin, beta-blocker and statin     Solitary kidney  Patient was born with a solitary kidney  Closely monitor renal function as he received contrast during CT scan.   Creatinine 0.95, GFR is 96.3   Potassium was corrected in the hospital     Hyperlipidemia  Continue statin  LDL 53, HDL 24, triglycerides 661 and total cholesterol 174.  Hypertriglyceridemia.  Nonfasting lipid panel  Repeat lipid panel in fasting state, May need Vascepa     Gout  Continue allopurinol     Overweight  BMI is  28.89. Weight is 190lbs.  Dietary changes and lifestyle modifications discussed with the patient.  Screening and treatment for sleep apnea also suggested

## 2024-03-18 ENCOUNTER — OFFICE VISIT (OUTPATIENT)
Dept: CARDIOLOGY | Facility: CLINIC | Age: 54
End: 2024-03-18
Payer: MEDICAID

## 2024-03-18 ENCOUNTER — ANTICOAGULATION VISIT (OUTPATIENT)
Dept: CARDIOLOGY | Facility: CLINIC | Age: 54
End: 2024-03-18
Payer: MEDICAID

## 2024-03-18 VITALS
WEIGHT: 190 LBS | HEART RATE: 77 BPM | HEIGHT: 68 IN | OXYGEN SATURATION: 98 % | SYSTOLIC BLOOD PRESSURE: 138 MMHG | BODY MASS INDEX: 28.79 KG/M2 | DIASTOLIC BLOOD PRESSURE: 88 MMHG

## 2024-03-18 DIAGNOSIS — Z79.01 LONG TERM (CURRENT) USE OF ANTICOAGULANTS: ICD-10-CM

## 2024-03-18 DIAGNOSIS — I82.402 RECURRENT ACUTE DEEP VEIN THROMBOSIS (DVT) OF LEFT LOWER EXTREMITY: ICD-10-CM

## 2024-03-18 DIAGNOSIS — Z82.49 FAMILY HISTORY OF PREMATURE CAD: ICD-10-CM

## 2024-03-18 DIAGNOSIS — R60.0 EDEMA OF LEFT LOWER EXTREMITY: ICD-10-CM

## 2024-03-18 DIAGNOSIS — K21.9 GASTROESOPHAGEAL REFLUX DISEASE WITHOUT ESOPHAGITIS: ICD-10-CM

## 2024-03-18 DIAGNOSIS — R94.39 ABNORMAL NUCLEAR STRESS TEST: ICD-10-CM

## 2024-03-18 DIAGNOSIS — I10 ESSENTIAL HYPERTENSION: ICD-10-CM

## 2024-03-18 DIAGNOSIS — R07.9 ACUTE CHEST PAIN: ICD-10-CM

## 2024-03-18 DIAGNOSIS — E66.3 OVERWEIGHT (BMI 25.0-29.9): ICD-10-CM

## 2024-03-18 DIAGNOSIS — Q60.0 SOLITARY KIDNEY, CONGENITAL: ICD-10-CM

## 2024-03-18 DIAGNOSIS — I82.412 ACUTE DEEP VEIN THROMBOSIS (DVT) OF FEMORAL VEIN OF LEFT LOWER EXTREMITY: Primary | ICD-10-CM

## 2024-03-18 DIAGNOSIS — M79.605 LEFT LEG PAIN: ICD-10-CM

## 2024-03-18 DIAGNOSIS — E78.2 MIXED HYPERLIPIDEMIA: ICD-10-CM

## 2024-03-18 DIAGNOSIS — I51.89 GRADE I DIASTOLIC DYSFUNCTION: ICD-10-CM

## 2024-03-18 LAB — INR PPP: 5.7 (ref 2–3)

## 2024-03-18 PROCEDURE — 1159F MED LIST DOCD IN RCRD: CPT | Performed by: INTERNAL MEDICINE

## 2024-03-18 PROCEDURE — 1160F RVW MEDS BY RX/DR IN RCRD: CPT | Performed by: INTERNAL MEDICINE

## 2024-03-18 PROCEDURE — 85610 PROTHROMBIN TIME: CPT | Performed by: INTERNAL MEDICINE

## 2024-03-18 PROCEDURE — 36416 COLLJ CAPILLARY BLOOD SPEC: CPT | Performed by: INTERNAL MEDICINE

## 2024-03-18 PROCEDURE — 3075F SYST BP GE 130 - 139MM HG: CPT | Performed by: INTERNAL MEDICINE

## 2024-03-18 PROCEDURE — 99214 OFFICE O/P EST MOD 30 MIN: CPT | Performed by: INTERNAL MEDICINE

## 2024-03-18 PROCEDURE — 3079F DIAST BP 80-89 MM HG: CPT | Performed by: INTERNAL MEDICINE

## 2024-03-18 RX ORDER — TESTOSTERONE CYPIONATE 200 MG/ML
100 INJECTION, SOLUTION INTRAMUSCULAR
COMMUNITY
Start: 2024-03-01

## 2024-03-23 DIAGNOSIS — I82.412 ACUTE DEEP VEIN THROMBOSIS (DVT) OF FEMORAL VEIN OF LEFT LOWER EXTREMITY: Primary | ICD-10-CM

## 2024-03-25 ENCOUNTER — TELEPHONE (OUTPATIENT)
Dept: CARDIOLOGY | Facility: CLINIC | Age: 54
End: 2024-03-25

## 2024-03-25 ENCOUNTER — ANTICOAGULATION VISIT (OUTPATIENT)
Dept: CARDIOLOGY | Facility: CLINIC | Age: 54
End: 2024-03-25
Payer: MEDICAID

## 2024-03-25 ENCOUNTER — LAB (OUTPATIENT)
Dept: LAB | Facility: HOSPITAL | Age: 54
End: 2024-03-25
Payer: MEDICAID

## 2024-03-25 VITALS
SYSTOLIC BLOOD PRESSURE: 174 MMHG | WEIGHT: 203 LBS | HEART RATE: 68 BPM | BODY MASS INDEX: 30.87 KG/M2 | DIASTOLIC BLOOD PRESSURE: 110 MMHG

## 2024-03-25 DIAGNOSIS — I82.412 ACUTE DEEP VEIN THROMBOSIS (DVT) OF FEMORAL VEIN OF LEFT LOWER EXTREMITY: Primary | ICD-10-CM

## 2024-03-25 DIAGNOSIS — I82.4Y2 ACUTE DEEP VEIN THROMBOSIS (DVT) OF PROXIMAL VEIN OF LEFT LOWER EXTREMITY: ICD-10-CM

## 2024-03-25 DIAGNOSIS — R94.39 ABNORMAL NUCLEAR STRESS TEST: ICD-10-CM

## 2024-03-25 DIAGNOSIS — Z79.01 LONG TERM (CURRENT) USE OF ANTICOAGULANTS: ICD-10-CM

## 2024-03-25 LAB
ALBUMIN SERPL-MCNC: 3.7 G/DL (ref 3.5–5.2)
ALBUMIN/GLOB SERPL: 1.6 G/DL
ALP SERPL-CCNC: 54 U/L (ref 39–117)
ALT SERPL W P-5'-P-CCNC: 25 U/L (ref 1–41)
ANION GAP SERPL CALCULATED.3IONS-SCNC: 8 MMOL/L (ref 5–15)
ARTICHOKE IGE QN: 51 MG/DL (ref 0–100)
AST SERPL-CCNC: 24 U/L (ref 1–40)
BASOPHILS # BLD AUTO: 0.05 10*3/MM3 (ref 0–0.2)
BASOPHILS NFR BLD AUTO: 0.8 % (ref 0–1.5)
BILIRUB SERPL-MCNC: 0.2 MG/DL (ref 0–1.2)
BUN SERPL-MCNC: 10 MG/DL (ref 6–20)
BUN/CREAT SERPL: 8.9 (ref 7–25)
CALCIUM SPEC-SCNC: 8.7 MG/DL (ref 8.6–10.5)
CHLORIDE SERPL-SCNC: 102 MMOL/L (ref 98–107)
CHOLEST SERPL-MCNC: 238 MG/DL (ref 0–200)
CO2 SERPL-SCNC: 30 MMOL/L (ref 22–29)
CREAT SERPL-MCNC: 1.12 MG/DL (ref 0.76–1.27)
DEPRECATED RDW RBC AUTO: 46.6 FL (ref 37–54)
EGFRCR SERPLBLD CKD-EPI 2021: 79 ML/MIN/1.73
EOSINOPHIL # BLD AUTO: 0.12 10*3/MM3 (ref 0–0.4)
EOSINOPHIL NFR BLD AUTO: 1.9 % (ref 0.3–6.2)
ERYTHROCYTE [DISTWIDTH] IN BLOOD BY AUTOMATED COUNT: 13.1 % (ref 12.3–15.4)
GLOBULIN UR ELPH-MCNC: 2.3 GM/DL
GLUCOSE SERPL-MCNC: 93 MG/DL (ref 65–99)
HCT VFR BLD AUTO: 45.1 % (ref 37.5–51)
HDLC SERPL-MCNC: 22 MG/DL (ref 40–60)
HGB BLD-MCNC: 15.2 G/DL (ref 13–17.7)
IMM GRANULOCYTES # BLD AUTO: 0.1 10*3/MM3 (ref 0–0.05)
IMM GRANULOCYTES NFR BLD AUTO: 1.6 % (ref 0–0.5)
INR PPP: 2 (ref 2–3)
INR PPP: 2.31 (ref 2–3)
LDLC SERPL CALC-MCNC: ABNORMAL MG/DL
LDLC/HDLC SERPL: ABNORMAL {RATIO}
LYMPHOCYTES # BLD AUTO: 2.22 10*3/MM3 (ref 0.7–3.1)
LYMPHOCYTES NFR BLD AUTO: 35 % (ref 19.6–45.3)
MCH RBC QN AUTO: 32.8 PG (ref 26.6–33)
MCHC RBC AUTO-ENTMCNC: 33.7 G/DL (ref 31.5–35.7)
MCV RBC AUTO: 97.4 FL (ref 79–97)
MONOCYTES # BLD AUTO: 0.68 10*3/MM3 (ref 0.1–0.9)
MONOCYTES NFR BLD AUTO: 10.7 % (ref 5–12)
NEUTROPHILS NFR BLD AUTO: 3.18 10*3/MM3 (ref 1.7–7)
NEUTROPHILS NFR BLD AUTO: 50 % (ref 42.7–76)
NRBC BLD AUTO-RTO: 0 /100 WBC (ref 0–0.2)
PLATELET # BLD AUTO: 160 10*3/MM3 (ref 140–450)
PMV BLD AUTO: 11.5 FL (ref 6–12)
POTASSIUM SERPL-SCNC: 4 MMOL/L (ref 3.5–5.2)
PROT SERPL-MCNC: 6 G/DL (ref 6–8.5)
PROTHROMBIN TIME: 23.7 SECONDS (ref 19.4–28.5)
RBC # BLD AUTO: 4.63 10*6/MM3 (ref 4.14–5.8)
SODIUM SERPL-SCNC: 140 MMOL/L (ref 136–145)
TRIGL SERPL-MCNC: 865 MG/DL (ref 0–150)
VLDLC SERPL-MCNC: ABNORMAL MG/DL
WBC NRBC COR # BLD AUTO: 6.35 10*3/MM3 (ref 3.4–10.8)

## 2024-03-25 PROCEDURE — 85610 PROTHROMBIN TIME: CPT

## 2024-03-25 PROCEDURE — 80053 COMPREHEN METABOLIC PANEL: CPT

## 2024-03-25 PROCEDURE — 36416 COLLJ CAPILLARY BLOOD SPEC: CPT | Performed by: NURSE PRACTITIONER

## 2024-03-25 PROCEDURE — 83721 ASSAY OF BLOOD LIPOPROTEIN: CPT

## 2024-03-25 PROCEDURE — 85025 COMPLETE CBC W/AUTO DIFF WBC: CPT

## 2024-03-25 PROCEDURE — 80061 LIPID PANEL: CPT

## 2024-03-25 PROCEDURE — 85610 PROTHROMBIN TIME: CPT | Performed by: NURSE PRACTITIONER

## 2024-03-25 PROCEDURE — 36415 COLL VENOUS BLD VENIPUNCTURE: CPT

## 2024-03-25 RX ORDER — WARFARIN SODIUM 5 MG/1
TABLET ORAL
Qty: 30 TABLET | Refills: 0 | Status: SHIPPED | OUTPATIENT
Start: 2024-03-25

## 2024-03-25 NOTE — TELEPHONE ENCOUNTER
Pt in for protime today. Complains of tightness and firmness along with swelling and pain in his Left lower leg. Pt states when he was in here on 3/18/24 his symptoms had improved and were better for a couple of days. Since then they have worsened. While in office today blood pressure was also elevated. Pt was due to go home and take medication. Pt denies H/A advised take medication and recheck blood pressures. INR was inproved at 2.0 today increased medication to achieve 2.5-3.5 and will recheck on Thursday.     Pt would like a call from Mery Penaloza NP or Dr. To to discuss        Blood pressure readings were initially 170/116 HR 68 manual recheck was 174/110.       Please advise

## 2024-03-25 NOTE — TELEPHONE ENCOUNTER
Rx Refill Note  Requested Prescriptions     Pending Prescriptions Disp Refills    warfarin (COUMADIN) 5 MG tablet [Pharmacy Med Name: Warfarin Sodium 5 MG Oral Tablet] 30 tablet 0     Sig: TAKE 1 TABLET BY MOUTH ONCE DAILY. INDICATIONS: DVT/PE (ACTIVE THROMBOSIS)      Last office visit with prescribing clinician: Visit date not found   Last telemedicine visit with prescribing clinician: Visit date not found   Next office visit with prescribing clinician: Visit date not found     Anticoagulation Visit (03/18/2024)     Jovita Whittington LPN  03/25/24, 12:05 EDT

## 2024-03-26 NOTE — TELEPHONE ENCOUNTER
Returned call and spoke to patient. Verified his antihypertensive list. He reports compliance with meds. He checked his blood pressure while we were on the phone. It was 103/72. He was advised to check his blood pressure once daily for the next week and keep a log to report back to us. In regards to his leg swelling, his warfarin dose is being adjusted to achieve INR of 2.5-3.5. He was advised to ambulate and keep leg elevated when seated. He verbalized understanding.

## 2024-03-28 ENCOUNTER — ANTICOAGULATION VISIT (OUTPATIENT)
Dept: CARDIOLOGY | Facility: CLINIC | Age: 54
End: 2024-03-28
Payer: MEDICAID

## 2024-03-28 VITALS
HEART RATE: 73 BPM | SYSTOLIC BLOOD PRESSURE: 135 MMHG | BODY MASS INDEX: 30.56 KG/M2 | DIASTOLIC BLOOD PRESSURE: 99 MMHG | WEIGHT: 201 LBS

## 2024-03-28 DIAGNOSIS — Z79.01 LONG TERM (CURRENT) USE OF ANTICOAGULANTS: ICD-10-CM

## 2024-03-28 DIAGNOSIS — I82.412 ACUTE DEEP VEIN THROMBOSIS (DVT) OF FEMORAL VEIN OF LEFT LOWER EXTREMITY: Primary | ICD-10-CM

## 2024-03-28 LAB — INR PPP: 3.4 (ref 2.5–3.5)

## 2024-04-01 ENCOUNTER — ANTICOAGULATION VISIT (OUTPATIENT)
Dept: CARDIOLOGY | Facility: CLINIC | Age: 54
End: 2024-04-01
Payer: MEDICAID

## 2024-04-01 VITALS
WEIGHT: 200.8 LBS | DIASTOLIC BLOOD PRESSURE: 93 MMHG | BODY MASS INDEX: 30.53 KG/M2 | SYSTOLIC BLOOD PRESSURE: 133 MMHG | HEART RATE: 90 BPM

## 2024-04-01 DIAGNOSIS — I82.412 ACUTE DEEP VEIN THROMBOSIS (DVT) OF FEMORAL VEIN OF LEFT LOWER EXTREMITY: Primary | ICD-10-CM

## 2024-04-01 DIAGNOSIS — Z79.01 LONG TERM (CURRENT) USE OF ANTICOAGULANTS: ICD-10-CM

## 2024-04-01 LAB — INR PPP: 5.1 (ref 2–3)

## 2024-04-01 PROCEDURE — 36416 COLLJ CAPILLARY BLOOD SPEC: CPT | Performed by: NURSE PRACTITIONER

## 2024-04-01 PROCEDURE — 85610 PROTHROMBIN TIME: CPT | Performed by: NURSE PRACTITIONER

## 2024-04-02 DIAGNOSIS — Z01.818 PREOP TESTING: Primary | ICD-10-CM

## 2024-04-04 ENCOUNTER — HOSPITAL ENCOUNTER (OUTPATIENT)
Facility: HOSPITAL | Age: 54
Setting detail: HOSPITAL OUTPATIENT SURGERY
Discharge: HOME | End: 2024-04-04
Attending: INTERNAL MEDICINE | Admitting: INTERNAL MEDICINE
Payer: MEDICAID

## 2024-04-04 ENCOUNTER — LAB (OUTPATIENT)
Dept: LAB | Facility: HOSPITAL | Age: 54
End: 2024-04-04
Payer: MEDICAID

## 2024-04-04 VITALS
SYSTOLIC BLOOD PRESSURE: 147 MMHG | RESPIRATION RATE: 17 BRPM | WEIGHT: 199.74 LBS | TEMPERATURE: 98.1 F | HEIGHT: 69 IN | HEART RATE: 85 BPM | OXYGEN SATURATION: 96 % | DIASTOLIC BLOOD PRESSURE: 92 MMHG | BODY MASS INDEX: 29.58 KG/M2

## 2024-04-04 DIAGNOSIS — Z01.818 PREOP TESTING: ICD-10-CM

## 2024-04-04 DIAGNOSIS — R94.39 ABNORMAL NUCLEAR STRESS TEST: ICD-10-CM

## 2024-04-04 LAB
BASOPHILS # BLD AUTO: 0.06 10*3/MM3 (ref 0–0.2)
BASOPHILS NFR BLD AUTO: 1 % (ref 0–1.5)
DEPRECATED RDW RBC AUTO: 48.8 FL (ref 37–54)
EOSINOPHIL # BLD AUTO: 0.11 10*3/MM3 (ref 0–0.4)
EOSINOPHIL NFR BLD AUTO: 1.8 % (ref 0.3–6.2)
ERYTHROCYTE [DISTWIDTH] IN BLOOD BY AUTOMATED COUNT: 13.5 % (ref 12.3–15.4)
HCT VFR BLD AUTO: 45.4 % (ref 37.5–51)
HGB BLD-MCNC: 15.7 G/DL (ref 13–17.7)
IMM GRANULOCYTES # BLD AUTO: 0.04 10*3/MM3 (ref 0–0.05)
IMM GRANULOCYTES NFR BLD AUTO: 0.7 % (ref 0–0.5)
INR PPP: 2.89 (ref 0.93–1.1)
LYMPHOCYTES # BLD AUTO: 2.12 10*3/MM3 (ref 0.7–3.1)
LYMPHOCYTES NFR BLD AUTO: 35 % (ref 19.6–45.3)
MCH RBC QN AUTO: 33.9 PG (ref 26.6–33)
MCHC RBC AUTO-ENTMCNC: 34.6 G/DL (ref 31.5–35.7)
MCV RBC AUTO: 98.1 FL (ref 79–97)
MONOCYTES # BLD AUTO: 0.67 10*3/MM3 (ref 0.1–0.9)
MONOCYTES NFR BLD AUTO: 11.1 % (ref 5–12)
NEUTROPHILS NFR BLD AUTO: 3.05 10*3/MM3 (ref 1.7–7)
NEUTROPHILS NFR BLD AUTO: 50.4 % (ref 42.7–76)
NRBC BLD AUTO-RTO: 0 /100 WBC (ref 0–0.2)
PLATELET # BLD AUTO: 193 10*3/MM3 (ref 140–450)
PMV BLD AUTO: 10.9 FL (ref 6–12)
PROTHROMBIN TIME: 29.1 SECONDS (ref 9.6–11.7)
RBC # BLD AUTO: 4.63 10*6/MM3 (ref 4.14–5.8)
WBC NRBC COR # BLD AUTO: 6.05 10*3/MM3 (ref 3.4–10.8)

## 2024-04-04 PROCEDURE — 25510000001 IOPAMIDOL PER 1 ML: Performed by: INTERNAL MEDICINE

## 2024-04-04 PROCEDURE — 36415 COLL VENOUS BLD VENIPUNCTURE: CPT

## 2024-04-04 PROCEDURE — C1894 INTRO/SHEATH, NON-LASER: HCPCS | Performed by: INTERNAL MEDICINE

## 2024-04-04 PROCEDURE — 25010000002 MIDAZOLAM PER 1 MG: Performed by: INTERNAL MEDICINE

## 2024-04-04 PROCEDURE — 25010000002 NICARDIPINE 2.5 MG/ML SOLUTION: Performed by: INTERNAL MEDICINE

## 2024-04-04 PROCEDURE — 25010000002 LIDOCAINE 1 % SOLUTION: Performed by: INTERNAL MEDICINE

## 2024-04-04 PROCEDURE — 85610 PROTHROMBIN TIME: CPT | Performed by: INTERNAL MEDICINE

## 2024-04-04 PROCEDURE — 85025 COMPLETE CBC W/AUTO DIFF WBC: CPT

## 2024-04-04 PROCEDURE — C1769 GUIDE WIRE: HCPCS | Performed by: INTERNAL MEDICINE

## 2024-04-04 PROCEDURE — 25810000003 SODIUM CHLORIDE 0.9 % SOLUTION: Performed by: INTERNAL MEDICINE

## 2024-04-04 PROCEDURE — 99152 MOD SED SAME PHYS/QHP 5/>YRS: CPT | Performed by: INTERNAL MEDICINE

## 2024-04-04 PROCEDURE — 93458 L HRT ARTERY/VENTRICLE ANGIO: CPT | Performed by: INTERNAL MEDICINE

## 2024-04-04 PROCEDURE — 25010000002 FENTANYL CITRATE (PF) 100 MCG/2ML SOLUTION: Performed by: INTERNAL MEDICINE

## 2024-04-04 RX ORDER — ROSUVASTATIN CALCIUM 20 MG/1
20 TABLET, COATED ORAL DAILY
Qty: 90 TABLET | Refills: 3 | Status: SHIPPED | OUTPATIENT
Start: 2024-04-04

## 2024-04-04 RX ORDER — NICARDIPINE HYDROCHLORIDE 2.5 MG/ML
INJECTION INTRAVENOUS
Status: DISCONTINUED | OUTPATIENT
Start: 2024-04-04 | End: 2024-04-04 | Stop reason: HOSPADM

## 2024-04-04 RX ORDER — NITROGLYCERIN 0.4 MG/1
0.4 TABLET SUBLINGUAL
Status: DISCONTINUED | OUTPATIENT
Start: 2024-04-04 | End: 2024-04-04 | Stop reason: HOSPADM

## 2024-04-04 RX ORDER — MIDAZOLAM HYDROCHLORIDE 1 MG/ML
INJECTION INTRAMUSCULAR; INTRAVENOUS
Status: DISCONTINUED | OUTPATIENT
Start: 2024-04-04 | End: 2024-04-04 | Stop reason: HOSPADM

## 2024-04-04 RX ORDER — SODIUM CHLORIDE 9 MG/ML
30 INJECTION, SOLUTION INTRAVENOUS CONTINUOUS
Status: DISCONTINUED | OUTPATIENT
Start: 2024-04-04 | End: 2024-04-04 | Stop reason: HOSPADM

## 2024-04-04 RX ORDER — ACETAMINOPHEN 325 MG/1
650 TABLET ORAL EVERY 4 HOURS PRN
Status: DISCONTINUED | OUTPATIENT
Start: 2024-04-04 | End: 2024-04-04 | Stop reason: HOSPADM

## 2024-04-04 RX ORDER — DIPHENHYDRAMINE HCL 25 MG
25 CAPSULE ORAL EVERY 6 HOURS PRN
Status: DISCONTINUED | OUTPATIENT
Start: 2024-04-04 | End: 2024-04-04 | Stop reason: HOSPADM

## 2024-04-04 RX ORDER — FENTANYL CITRATE 50 UG/ML
INJECTION, SOLUTION INTRAMUSCULAR; INTRAVENOUS
Status: DISCONTINUED | OUTPATIENT
Start: 2024-04-04 | End: 2024-04-04 | Stop reason: HOSPADM

## 2024-04-04 RX ORDER — LIDOCAINE HYDROCHLORIDE 10 MG/ML
INJECTION, SOLUTION INFILTRATION; PERINEURAL
Status: DISCONTINUED | OUTPATIENT
Start: 2024-04-04 | End: 2024-04-04 | Stop reason: HOSPADM

## 2024-04-04 RX ORDER — ONDANSETRON 4 MG/1
4 TABLET, ORALLY DISINTEGRATING ORAL EVERY 6 HOURS PRN
Status: DISCONTINUED | OUTPATIENT
Start: 2024-04-04 | End: 2024-04-04 | Stop reason: HOSPADM

## 2024-04-04 RX ORDER — ONDANSETRON 2 MG/ML
4 INJECTION INTRAMUSCULAR; INTRAVENOUS EVERY 6 HOURS PRN
Status: DISCONTINUED | OUTPATIENT
Start: 2024-04-04 | End: 2024-04-04 | Stop reason: HOSPADM

## 2024-04-04 RX ORDER — SODIUM CHLORIDE 9 MG/ML
INJECTION, SOLUTION INTRAVENOUS
Status: COMPLETED | OUTPATIENT
Start: 2024-04-04 | End: 2024-04-04

## 2024-04-04 RX ORDER — ICOSAPENT ETHYL 1000 MG/1
2 CAPSULE ORAL 2 TIMES DAILY WITH MEALS
Qty: 120 CAPSULE | Refills: 3 | Status: SHIPPED | OUTPATIENT
Start: 2024-04-04

## 2024-04-04 RX ADMIN — SODIUM CHLORIDE 30 ML/HR: 9 INJECTION, SOLUTION INTRAVENOUS at 12:18

## 2024-04-04 NOTE — Clinical Note
R-Band was used in achieving hemostasis. Radial compression device applied to vessel. Hemostasis achieved successfully. Closure device additional comment: 15cc air

## 2024-04-04 NOTE — DISCHARGE INSTRUCTIONS
Post Cath Instructions    Call Dr. To’s office to schedule a follow up appointment  Specific Physician Instructions:     Drink plenty of fluids for the next 24 hours.  This helps to eliminate the dye used in your procedure through urination.  You may resume a normal diet; however, try to avoid foods that would cause gas or constipation.    Sedative medication given to you during your catheterization may decrease your judgement and reaction time for up to 24-48 hours.  Therefore:  DO NOT drive or operate hazardous machinery (48 hours)  DO NOT consume alcoholic beverages  DO NOT make any important/legal decisions  Have someone stay with you for at least 24 hours    To allow proper healing and prevent bleeding, the following activities are to be strictly avoided for the next 24-48 hours:  Excessive bending at wound site  Straining (anything that would tense up muscles around the affected puncture site)  Lifting objects greater than 5 pounds, pushing, or pulling for 5 days  For Arm Cases:  No flexing at the puncture site, such as hammering, golfing, bowling, or swinging any objects      Keep the puncture site clean and dry.  You may remove the dressing tomorrow and replace it with a band-aid for at least one additional day.  Gently clean the site with mild soap and water.  No scrubbing/rubbing and lightly pat the area dry.  Showers are acceptable; however, avoid submerging in water (tub baths, hot tubs, swimming pools, dishwater, etc…) for at least one week.  The site should be completely healed before resuming these activities to reduce the risk of infection.  Check the site often.  Watch for signs and symptoms of infection and notify your physician if any of the following occur:  Bleeding or an increase in swelling at the puncture site  Fever  Increased soreness around puncture site  Foul odor or significant drainage from the puncture site  Swelling, redness, or warmth at the puncture site    **A bruise or small  “pea sized” lump under the skin at the puncture site is not unusual.  This should disappear within 3-4 weeks.**  CONTACT YOUR PHYSICIAN OR CALL 911 IF YOU EXPERIENCE ANY OF THE FOLLOWING:  Increased angina (chest pain) or frequent sensations of pressure, burning, pain, or other discomfort in the chest, arm, jaws, or stomach  Lightheadedness, dizziness, faint feeling, sweating, or difficulty breathing  Odd sensation changes like numbness, tingling, coldness, or pain in the arm or leg in which the catheter was inserted  Limb in which the catheter was inserted becomes pale/bluish in color    IMPORTANT:  Although this occurs very rarely, if you should develop bright red or excessive bleeding, feel a “pop” inside at the insertion site, or notice a sudden increase in swelling larger than a walnut, you should call 911.  Hold continuous firm pressure to the access site until emergency personnel arrive.  It is best if someone else can do this for you.

## 2024-04-08 ENCOUNTER — ANTICOAGULATION VISIT (OUTPATIENT)
Dept: CARDIOLOGY | Facility: CLINIC | Age: 54
End: 2024-04-08
Payer: MEDICAID

## 2024-04-08 VITALS
WEIGHT: 203 LBS | SYSTOLIC BLOOD PRESSURE: 118 MMHG | BODY MASS INDEX: 30.42 KG/M2 | DIASTOLIC BLOOD PRESSURE: 85 MMHG | HEART RATE: 88 BPM

## 2024-04-08 DIAGNOSIS — Z79.01 LONG TERM (CURRENT) USE OF ANTICOAGULANTS: ICD-10-CM

## 2024-04-08 DIAGNOSIS — I82.412 ACUTE DEEP VEIN THROMBOSIS (DVT) OF FEMORAL VEIN OF LEFT LOWER EXTREMITY: Primary | ICD-10-CM

## 2024-04-08 LAB — INR PPP: 1.8 (ref 0.9–1.1)

## 2024-04-08 PROCEDURE — 36416 COLLJ CAPILLARY BLOOD SPEC: CPT | Performed by: NURSE PRACTITIONER

## 2024-04-08 PROCEDURE — 85610 PROTHROMBIN TIME: CPT | Performed by: NURSE PRACTITIONER

## 2024-04-09 NOTE — PROGRESS NOTES
HEMATOLOGY ONCOLOGY OUTPATIENT FOLLOW-UP      Patient name: Spencer Brothers  : 1970  MRN: [id number]  Primary Care Physician: Kaleigh Strickland APRN  Referring Physician: No ref. provider found  Reason For Consult: Deep vein thrombosis.    History of Present Illness:  Patient is a 53 y.o.     3/1/2024: For the first time in the office referred from the hospital.  He presented there after many months of left lower extremity edema.  This had started maybe even years before with some injury to the knee.  Apparently he has at least 1 meniscal tear.  Because of that he slowly became less active and at some point he could no longer do his job as a .  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  He presented to the hospital in 2024.  The pain and the edema of the lower extremity had become very severe and he could no longer tolerate it. He was sent to the hospital and underwent a Duplex ultrasound of the extremity which showed deep vein thrombus that involved the common femoral, the proximal femoral, the mid femoral and distal femoral, as well as the popliteal, the peroneal and the gastrocnemius veins. He underwent a mechanical thrombectomy and was discharged on warfarin and enoxaparin. He is feeling somewhat better. He is scheduled for another thrombectomy in the next few days. He has been eating well. No chest pain at this point but in the hospital he complained of chest pain. No abdominal pain or diarrhea and no dysuria. Social history and family history were reviewed. On exam alert and conversant. No jaundice. No oral lesions. Lungs clear and heart regular. Abdomen soft and not tender. There is left lower extremity edema. Reviewed all records from the hospital. Reviewed all the laboratory exams. Discussed with him. To continue the same anticoagulation and to follow in the warfarin clinic. He  will need indefinite anticoagulation given the unprovoked nature of his thrombosis.     4/12/2024: About the same as at the time of the last visit.  Underwent an unsuccessful attempt at mechanical thrombectomy early last month.  He persists with some edema of the lower extremity that results in some discomfort at times.  He has been taking the anticoagulant without any difficulties.  On exam he is alert, conversant and in no distress.  No jaundice.  The lungs are clear.  The heart is regular.  The abdomen is soft and there is some edema of the lower extremities.  The laboratory exams were reviewed.  Requested lupus anticoagulant and anticardiolipin antibodies and will see him with results in approximately 4 months.    Spencer Brothers reports a pain score of 6.  Given his pain assessment as noted, treatment options were discussed and the following options were decided upon as a follow-up plan to address the patient's pain: continuation of current treatment plan for pain.   Past Medical History:   Diagnosis Date    Allergic     Arthritis     DVT (deep venous thrombosis) 02/05/2024    GERD (gastroesophageal reflux disease)     Hyperlipidemia     Hypertension      Past Surgical History:   Procedure Laterality Date    CARDIAC CATHETERIZATION N/A 02/05/2024    Procedure: Percutaneous Mechanical Thrombectomy;  Surgeon: Enrique To MD;  Location: [facility] CATH INVASIVE LOCATION;  Service: Cardiovascular;  Laterality: N/A;    CARDIAC CATHETERIZATION N/A 03/08/2024    Procedure: Percutaneous Mechanical Thrombectomy;  Surgeon: Enrique To MD;  Location: [facility] CATH INVASIVE LOCATION;  Service: Cardiovascular;  Laterality: N/A;    CARDIAC CATHETERIZATION Right 4/4/2024    Procedure: Left Heart Cath with Coronary Angiography;  Surgeon: Enrique To MD;  Location: [facility] CATH INVASIVE LOCATION;  Service: Cardiovascular;  Laterality: Right;    COLONOSCOPY      KNEE SURGERY      NEPHRECTOMY      SHOULDER SURGERY         Current  Outpatient Medications:     allopurinol (ZYLOPRIM) 300 MG tablet, Take 1 tablet by mouth Daily., Disp: , Rfl:     amLODIPine (NORVASC) 10 MG tablet, Take 1 tablet by mouth Daily., Disp: , Rfl:     carvedilol (COREG) 25 MG tablet, Take 1 tablet by mouth 2 (Two) Times a Day With Meals for 360 days., Disp: 180 tablet, Rfl: 3    Cholecalciferol (VITAMIN D-3 PO), Take 1 capsule by mouth Daily., Disp: , Rfl:     cloNIDine (CATAPRES) 0.2 MG tablet, Take 1 tablet by mouth 2 (Two) Times a Day., Disp: , Rfl:     Cyanocobalamin (VITAMIN B 12 PO), Take 1 tablet by mouth Daily., Disp: , Rfl:     diclofenac (VOLTAREN) 50 MG EC tablet, Take 1 tablet by mouth 2 (Two) Times a Day As Needed (Headache)., Disp: , Rfl:     divalproex (DEPAKOTE ER) 500 MG 24 hr tablet, Take 1 tablet by mouth 2 (Two) Times a Day., Disp: , Rfl:     hydrALAZINE (APRESOLINE) 100 MG tablet, Take 1 tablet by mouth 3 (Three) Times a Day., Disp: , Rfl:     hydroCHLOROthiazide (HYDRODIURIL) 25 MG tablet, Take 1 tablet by mouth Daily., Disp: , Rfl:     icosapent ethyl (Vascepa) 1 g capsule capsule, Take 2 g by mouth 2 (Two) Times a Day With Meals., Disp: 120 capsule, Rfl: 3    omeprazole (priLOSEC) 20 MG capsule, Take 1 capsule by mouth 2 (Two) Times a Day., Disp: , Rfl:     rosuvastatin (CRESTOR) 20 MG tablet, Take 1 tablet by mouth Daily., Disp: 90 tablet, Rfl: 3    Testosterone Cypionate (DEPOTESTOTERONE CYPIONATE) 200 MG/ML injection, Inject 0.5 mL into the appropriate muscle as directed by prescriber Every 14 (Fourteen) Days., Disp: , Rfl:     tiZANidine (ZANAFLEX) 4 MG tablet, Take 1 tablet by mouth 3 (Three) Times a Day As Needed for Muscle Spasms., Disp: , Rfl:     warfarin (COUMADIN) 5 MG tablet, TAKE 1 TABLET BY MOUTH ONCE DAILY. INDICATIONS: DVT/PE (ACTIVE THROMBOSIS), Disp: 30 tablet, Rfl: 0    zolpidem (AMBIEN) 10 MG tablet, Take 1 tablet by mouth At Night As Needed for Sleep., Disp: , Rfl:     acetaminophen (TYLENOL) 325 MG tablet, Take 2 tablets by  mouth Every 6 (Six) Hours As Needed for Mild Pain. (Patient not taking: Reported on 4/12/2024), Disp: , Rfl:     Allergies   Allergen Reactions    Lisinopril Angioedema     Family History   Problem Relation Age of Onset    Heart disease Mother     Heart attack Mother 53    Breast cancer Mother 50    Heart disease Maternal Uncle     Heart attack Maternal Uncle     Diabetes Maternal Uncle      Cancer-related family history includes Breast cancer (age of onset: 50) in his mother.    Social History     Tobacco Use    Smoking status: Never    Smokeless tobacco: Never   Vaping Use    Vaping status: Never Used   Substance Use Topics    Alcohol use: Yes     Comment: Occasional    Drug use: Never     Social History     Social History Narrative    Not on file     ROS:   Review of Systems   Constitutional:  Negative for activity change, appetite change, chills, diaphoresis, fatigue, fever and unexpected weight change.   HENT:  Negative for congestion, dental problem, drooling, ear discharge, ear pain, facial swelling, hearing loss, mouth sores, nosebleeds, postnasal drip, rhinorrhea, sinus pressure, sinus pain, sneezing, sore throat, tinnitus, trouble swallowing and voice change.    Eyes:  Negative for photophobia, pain, discharge, redness, itching and visual disturbance.   Respiratory:  Negative for apnea, cough, choking, chest tightness, shortness of breath, wheezing and stridor.    Cardiovascular:  Negative for chest pain, palpitations and leg swelling.   Gastrointestinal:  Negative for abdominal distention, abdominal pain, anal bleeding, blood in stool, constipation, diarrhea, nausea, rectal pain and vomiting.   Endocrine: Negative for cold intolerance, heat intolerance, polydipsia and polyuria.   Genitourinary:  Negative for decreased urine volume, difficulty urinating, dysuria, flank pain, frequency, genital sores, hematuria and urgency.   Musculoskeletal:  Positive for arthralgias, gait problem and myalgias. Negative  "for back pain, joint swelling, neck pain and neck stiffness.   Skin:  Negative for color change, pallor and rash.   Neurological:  Negative for dizziness, tremors, seizures, syncope, facial asymmetry, speech difficulty, weakness, light-headedness, numbness and headaches.   Hematological:  Negative for adenopathy. Does not bruise/bleed easily.   Psychiatric/Behavioral:  Negative for agitation, behavioral problems, confusion, decreased concentration, hallucinations, self-injury, sleep disturbance and suicidal ideas. The patient is not nervous/anxious.      Objective:    Vital Signs:  Vitals:    04/12/24 0916   BP: 138/99   Pulse: 86   SpO2: 96%   Weight: 91.4 kg (201 lb 9.6 oz)   Height: 174 cm (68.5\")   PainSc:   6   PainLoc: Leg       Body mass index is 30.21 kg/m².    ECOG  (1) Restricted in physically strenuous activity, ambulatory and able to do work of light nature    Physical Exam:   Physical Exam  Constitutional:       General: He is not in acute distress.     Appearance: He is not ill-appearing, toxic-appearing or diaphoretic.   HENT:      Head: Normocephalic and atraumatic.      Right Ear: External ear normal.      Left Ear: External ear normal.      Nose: Nose normal.      Mouth/Throat:      Mouth: Mucous membranes are moist.      Pharynx: Oropharynx is clear.   Eyes:      General: No scleral icterus.        Right eye: No discharge.         Left eye: No discharge.      Conjunctiva/sclera: Conjunctivae normal.      Pupils: Pupils are equal, round, and reactive to light.   Cardiovascular:      Rate and Rhythm: Normal rate and regular rhythm.      Pulses: Normal pulses.      Heart sounds: Normal heart sounds. No murmur heard.     No friction rub. No gallop.   Pulmonary:      Effort: No respiratory distress.      Breath sounds: No stridor. No wheezing, rhonchi or rales.   Chest:      Chest wall: No tenderness.   Abdominal:      General: Abdomen is flat. Bowel sounds are normal. There is no distension.      " Palpations: Abdomen is soft. There is no mass.      Tenderness: There is no abdominal tenderness. There is no right CVA tenderness, left CVA tenderness, guarding or rebound.   Musculoskeletal:         General: No swelling, tenderness, deformity or signs of injury.      Cervical back: No rigidity.      Right lower leg: No edema.      Left lower leg: No edema.   Lymphadenopathy:      Cervical: No cervical adenopathy.   Skin:     General: Skin is warm and dry.      Coloration: Skin is not jaundiced.      Findings: No bruising or rash.   Neurological:      General: No focal deficit present.      Mental Status: He is alert and oriented to person, place, and time.      Gait: Gait normal.   Psychiatric:         Mood and Affect: Mood normal.         Behavior: Behavior normal.         Thought Content: Thought content normal.         Judgment: Judgment normal.     MARYJANE Ramírez MD performed the physical exam on 4/12/2024 as documented above.    Lab Results - Last 18 Months   Lab Units 04/12/24  0838 04/04/24  0852 03/25/24  1435   WBC 10*3/mm3 7.55 6.05 6.35   HEMOGLOBIN g/dL 16.5 15.7 15.2   HEMATOCRIT % 49.5 45.4 45.1   PLATELETS 10*3/mm3 133* 193 160   MCV fL 99.8* 98.1* 97.4*     Lab Results - Last 18 Months   Lab Units 03/25/24  1435 03/01/24  0840 02/17/24  0335 02/16/24  1710   SODIUM mmol/L 140 141 138 139   POTASSIUM mmol/L 4.0 4.0 3.5 3.6   CHLORIDE mmol/L 102 100 100 97*   CO2 mmol/L 30.0* 29.0 28.0 32.0*   BUN mg/dL 10 13 13 12   CREATININE mg/dL 1.12 1.16 1.21 1.33*   CALCIUM mg/dL 8.7 9.4 9.2 10.0   BILIRUBIN mg/dL 0.2 0.3  --  0.6   ALK PHOS U/L 54 59  --  71   ALT (SGPT) U/L 25 31  --  49*   AST (SGOT) U/L 24 35  --  44*   GLUCOSE mg/dL 93 90 92 89     Lab Results   Component Value Date    GLUCOSE 93 03/25/2024    BUN 10 03/25/2024    CREATININE 1.12 03/25/2024    BCR 8.9 03/25/2024    K 4.0 03/25/2024    CO2 30.0 (H) 03/25/2024    CALCIUM 8.7 03/25/2024    ALBUMIN 3.7 03/25/2024    LABIL2 1.6 02/09/2022     AST 24 03/25/2024    ALT 25 03/25/2024     Lab Results - Last 18 Months   Lab Units 04/08/24  0932 04/04/24  0852 04/01/24  1417 03/01/24  0840 02/16/24  1710 02/06/24  0641 02/05/24  1409   INR  1.80* 2.89* 5.10*   < > 1.16*  --  1.12*   APTT seconds  --   --   --   --  30.8* 26.9* 25.6*    < > = values in this interval not displayed.     Lab Results   Component Value Date    FOLATE >20.0 06/08/2020     Lab Results   Component Value Date    HCBMIJJR93 1,416 (H) 08/14/2023     Lab Results   Component Value Date    KATHY Negative 08/14/2023    SEDRATE 9 08/14/2023     Lab Results   Component Value Date    PTT 30.8 (L) 02/16/2024    INR 1.80 (A) 04/08/2024     Lab Results   Component Value Date    PSA 1.79 08/05/2021     Lab Results   Component Value Date    SEDRATE 9 08/14/2023      Assessment & Plan     Unprovoked left lower extremity deep vein thrombosis status post mechanical thrombectomy. On anticoagulation with warfarin and enoxaparin.  Continue follow-up with the warfarin clinic.  Reviewed all records including records from the hospital and the laboratory exams.  Discussed with him.  He is to see me in approximately 4 months with new laboratory exams.    Brayan Ramírez MD on 4/12/2024 at 9:31 AM.

## 2024-04-12 ENCOUNTER — LAB (OUTPATIENT)
Dept: LAB | Facility: HOSPITAL | Age: 54
End: 2024-04-12
Payer: MEDICAID

## 2024-04-12 ENCOUNTER — OFFICE VISIT (OUTPATIENT)
Dept: ONCOLOGY | Facility: CLINIC | Age: 54
End: 2024-04-12
Payer: MEDICAID

## 2024-04-12 VITALS
DIASTOLIC BLOOD PRESSURE: 99 MMHG | HEART RATE: 86 BPM | SYSTOLIC BLOOD PRESSURE: 138 MMHG | BODY MASS INDEX: 29.86 KG/M2 | HEIGHT: 69 IN | WEIGHT: 201.6 LBS | OXYGEN SATURATION: 96 %

## 2024-04-12 DIAGNOSIS — I82.4Y2 ACUTE DEEP VEIN THROMBOSIS (DVT) OF PROXIMAL VEIN OF LEFT LOWER EXTREMITY: Primary | ICD-10-CM

## 2024-04-12 LAB
BASOPHILS # BLD AUTO: 0.02 10*3/MM3 (ref 0–0.2)
BASOPHILS NFR BLD AUTO: 0.3 % (ref 0–1.5)
DEPRECATED RDW RBC AUTO: 52.8 FL (ref 37–54)
EOSINOPHIL # BLD AUTO: 0.13 10*3/MM3 (ref 0–0.4)
EOSINOPHIL NFR BLD AUTO: 1.7 % (ref 0.3–6.2)
ERYTHROCYTE [DISTWIDTH] IN BLOOD BY AUTOMATED COUNT: 14.6 % (ref 12.3–15.4)
HCT VFR BLD AUTO: 49.5 % (ref 37.5–51)
HGB BLD-MCNC: 16.5 G/DL (ref 13–17.7)
HOLD SPECIMEN: NORMAL
LYMPHOCYTES # BLD AUTO: 2.03 10*3/MM3 (ref 0.7–3.1)
LYMPHOCYTES NFR BLD AUTO: 26.9 % (ref 19.6–45.3)
MCH RBC QN AUTO: 33.3 PG (ref 26.6–33)
MCHC RBC AUTO-ENTMCNC: 33.3 G/DL (ref 31.5–35.7)
MCV RBC AUTO: 99.8 FL (ref 79–97)
MONOCYTES # BLD AUTO: 0.89 10*3/MM3 (ref 0.1–0.9)
MONOCYTES NFR BLD AUTO: 11.8 % (ref 5–12)
NEUTROPHILS NFR BLD AUTO: 4.48 10*3/MM3 (ref 1.7–7)
NEUTROPHILS NFR BLD AUTO: 59.3 % (ref 42.7–76)
PLATELET # BLD AUTO: 133 10*3/MM3 (ref 140–450)
PMV BLD AUTO: 11.9 FL (ref 6–12)
RBC # BLD AUTO: 4.96 10*6/MM3 (ref 4.14–5.8)
WBC NRBC COR # BLD AUTO: 7.55 10*3/MM3 (ref 3.4–10.8)
WHOLE BLOOD HOLD COAG: NORMAL

## 2024-04-12 PROCEDURE — 36415 COLL VENOUS BLD VENIPUNCTURE: CPT

## 2024-04-12 PROCEDURE — 85025 COMPLETE CBC W/AUTO DIFF WBC: CPT

## 2024-04-13 LAB
CARDIOLIPIN IGG SER IA-ACNC: <9 GPL U/ML (ref 0–14)
CARDIOLIPIN IGM SER IA-ACNC: <9 MPL U/ML (ref 0–12)

## 2024-04-15 ENCOUNTER — ANTICOAGULATION VISIT (OUTPATIENT)
Dept: CARDIOLOGY | Facility: CLINIC | Age: 54
End: 2024-04-15
Payer: MEDICAID

## 2024-04-15 ENCOUNTER — DOCUMENTATION (OUTPATIENT)
Dept: CARDIOLOGY | Facility: CLINIC | Age: 54
End: 2024-04-15
Payer: MEDICAID

## 2024-04-15 ENCOUNTER — TELEPHONE (OUTPATIENT)
Dept: CARDIOLOGY | Facility: CLINIC | Age: 54
End: 2024-04-15
Payer: MEDICAID

## 2024-04-15 VITALS
WEIGHT: 195 LBS | DIASTOLIC BLOOD PRESSURE: 110 MMHG | SYSTOLIC BLOOD PRESSURE: 155 MMHG | BODY MASS INDEX: 29.22 KG/M2 | HEART RATE: 86 BPM

## 2024-04-15 DIAGNOSIS — Z79.01 LONG TERM (CURRENT) USE OF ANTICOAGULANTS: ICD-10-CM

## 2024-04-15 DIAGNOSIS — I82.412 ACUTE DEEP VEIN THROMBOSIS (DVT) OF FEMORAL VEIN OF LEFT LOWER EXTREMITY: Primary | ICD-10-CM

## 2024-04-15 LAB — INR PPP: 3.7 (ref 2.5–3.5)

## 2024-04-15 PROCEDURE — 85610 PROTHROMBIN TIME: CPT | Performed by: NURSE PRACTITIONER

## 2024-04-15 PROCEDURE — 36416 COLLJ CAPILLARY BLOOD SPEC: CPT | Performed by: NURSE PRACTITIONER

## 2024-04-15 RX ORDER — CHLORTHALIDONE 25 MG/1
25 TABLET ORAL DAILY
Qty: 30 TABLET | Refills: 0 | Status: SHIPPED | OUTPATIENT
Start: 2024-04-15

## 2024-04-15 NOTE — TELEPHONE ENCOUNTER
Per Jovita Escalera LPN, the patient's blood pressure is elevated today at his INR check in the office. His blood pressure was 159/107 and 140/118 with heart rate in the 80s. After 20 minutes repeat blood pressure was 155/110. He is complaining of headache.     Please advise patient to stop taking hydrochlorothiazide (HCTZ). He is going to be started on chlorthalidone 25 mg daily instead. He should monitor his blood pressure once a day and keep a log. He needs to report back to us after 1 week to see if further dose adjustment is needed.

## 2024-04-15 NOTE — TELEPHONE ENCOUNTER
Left detailed on patients voicemail that per ANA LUISA Penaloza patient is to discontinue using hydrochlorothiazide and instead start using the clorthalidone which has been sent into the [facility] pharmacy. Patient was advised to keep log of his blood pressure at least once a day for a week and report results back to the office in the event that a dosage adjustment needs to be made. MA name and phone number was left for any questions.

## 2024-04-17 ENCOUNTER — LAB (OUTPATIENT)
Dept: LAB | Facility: HOSPITAL | Age: 54
End: 2024-04-17
Payer: MEDICAID

## 2024-04-17 DIAGNOSIS — I82.4Y2 ACUTE DEEP VEIN THROMBOSIS (DVT) OF PROXIMAL VEIN OF LEFT LOWER EXTREMITY: Primary | ICD-10-CM

## 2024-04-17 DIAGNOSIS — I82.4Y2 ACUTE DEEP VEIN THROMBOSIS (DVT) OF PROXIMAL VEIN OF LEFT LOWER EXTREMITY: ICD-10-CM

## 2024-04-17 LAB
ALBUMIN SERPL-MCNC: 4 G/DL (ref 3.5–5.2)
ALBUMIN/GLOB SERPL: 1.5 G/DL
ALP SERPL-CCNC: 63 U/L (ref 39–117)
ALT SERPL W P-5'-P-CCNC: 18 U/L (ref 1–41)
ANION GAP SERPL CALCULATED.3IONS-SCNC: 12 MMOL/L (ref 5–15)
AST SERPL-CCNC: 18 U/L (ref 1–40)
B2 GLYCOPROT1 IGA SER-ACNC: <9 GPI IGA UNITS (ref 0–25)
B2 GLYCOPROT1 IGG SER-ACNC: <9 GPI IGG UNITS (ref 0–20)
B2 GLYCOPROT1 IGM SER-ACNC: <9 GPI IGM UNITS (ref 0–32)
BILIRUB SERPL-MCNC: 0.4 MG/DL (ref 0–1.2)
BUN SERPL-MCNC: 20 MG/DL (ref 6–20)
BUN/CREAT SERPL: 16.1 (ref 7–25)
CALCIUM SPEC-SCNC: 9.1 MG/DL (ref 8.6–10.5)
CHLORIDE SERPL-SCNC: 101 MMOL/L (ref 98–107)
CO2 SERPL-SCNC: 25 MMOL/L (ref 22–29)
CREAT SERPL-MCNC: 1.24 MG/DL (ref 0.76–1.27)
EGFRCR SERPLBLD CKD-EPI 2021: 70 ML/MIN/1.73
GLOBULIN UR ELPH-MCNC: 2.7 GM/DL
GLUCOSE SERPL-MCNC: 104 MG/DL (ref 65–99)
POTASSIUM SERPL-SCNC: 3.7 MMOL/L (ref 3.5–5.2)
PROT SERPL-MCNC: 6.7 G/DL (ref 6–8.5)
SODIUM SERPL-SCNC: 138 MMOL/L (ref 136–145)

## 2024-04-17 PROCEDURE — 36415 COLL VENOUS BLD VENIPUNCTURE: CPT

## 2024-04-17 PROCEDURE — 80053 COMPREHEN METABOLIC PANEL: CPT | Performed by: INTERNAL MEDICINE

## 2024-04-17 NOTE — PROGRESS NOTES
NEW ORDER FOR CMP PLACED FOR PATIENT TO HAVE DRAWN TODAY 4/17/24 DUE TO CMP TEST FROM 4/12/24 WAS UNABLE TO BE COMPLETED BECAUSE SPECIMEN HEMOLYZED.

## 2024-04-19 LAB
CONFIRM APTT/NORMAL: NORMAL SEC
SCREEN APTT: NORMAL S
SCREEN DRVVT: NORMAL S
THROMBIN TIME: NORMAL S

## 2024-04-20 DIAGNOSIS — I82.412 ACUTE DEEP VEIN THROMBOSIS (DVT) OF FEMORAL VEIN OF LEFT LOWER EXTREMITY: ICD-10-CM

## 2024-04-22 ENCOUNTER — TELEPHONE (OUTPATIENT)
Dept: ONCOLOGY | Facility: CLINIC | Age: 54
End: 2024-04-22
Payer: MEDICAID

## 2024-04-22 ENCOUNTER — HOSPITAL ENCOUNTER (OUTPATIENT)
Dept: CARDIOLOGY | Facility: HOSPITAL | Age: 54
Discharge: HOME | End: 2024-04-22
Admitting: INTERNAL MEDICINE
Payer: MEDICAID

## 2024-04-22 ENCOUNTER — ANTICOAGULATION VISIT (OUTPATIENT)
Dept: CARDIOLOGY | Facility: CLINIC | Age: 54
End: 2024-04-22
Payer: MEDICAID

## 2024-04-22 VITALS
WEIGHT: 199 LBS | SYSTOLIC BLOOD PRESSURE: 122 MMHG | BODY MASS INDEX: 29.82 KG/M2 | DIASTOLIC BLOOD PRESSURE: 78 MMHG | HEART RATE: 83 BPM

## 2024-04-22 DIAGNOSIS — I82.4Y2 ACUTE DEEP VEIN THROMBOSIS (DVT) OF PROXIMAL VEIN OF LEFT LOWER EXTREMITY: ICD-10-CM

## 2024-04-22 DIAGNOSIS — I82.412 ACUTE DEEP VEIN THROMBOSIS (DVT) OF FEMORAL VEIN OF LEFT LOWER EXTREMITY: ICD-10-CM

## 2024-04-22 DIAGNOSIS — M79.662 PAIN AND SWELLING OF LEFT LOWER LEG: ICD-10-CM

## 2024-04-22 DIAGNOSIS — M79.89 PAIN AND SWELLING OF LEFT LOWER LEG: ICD-10-CM

## 2024-04-22 DIAGNOSIS — Z79.01 LONG TERM (CURRENT) USE OF ANTICOAGULANTS: Primary | ICD-10-CM

## 2024-04-22 DIAGNOSIS — I82.4Y2 ACUTE DEEP VEIN THROMBOSIS (DVT) OF PROXIMAL VEIN OF LEFT LOWER EXTREMITY: Primary | ICD-10-CM

## 2024-04-22 LAB
BH CV LOW VAS LEFT DISTAL FEMORAL SPONT: 1
BH CV LOW VAS LEFT GASTRONEMIUS VESSEL: 1
BH CV LOW VAS LEFT MID FEMORAL SPONT: 1
BH CV LOW VAS LEFT POPLITEAL SPONT: 1
BH CV LOW VAS LEFT PROXIMAL FEMORAL SPONT: 1
BH CV LOWER VASCULAR LEFT COMMON FEMORAL AUGMENT: NORMAL
BH CV LOWER VASCULAR LEFT COMMON FEMORAL COMPETENT: NORMAL
BH CV LOWER VASCULAR LEFT COMMON FEMORAL COMPRESS: NORMAL
BH CV LOWER VASCULAR LEFT COMMON FEMORAL PHASIC: NORMAL
BH CV LOWER VASCULAR LEFT COMMON FEMORAL SPONT: NORMAL
BH CV LOWER VASCULAR LEFT DISTAL FEMORAL AUGMENT: NORMAL
BH CV LOWER VASCULAR LEFT DISTAL FEMORAL COMPETENT: NORMAL
BH CV LOWER VASCULAR LEFT DISTAL FEMORAL COMPRESS: NORMAL
BH CV LOWER VASCULAR LEFT DISTAL FEMORAL PHASIC: NORMAL
BH CV LOWER VASCULAR LEFT DISTAL FEMORAL SPONT: NORMAL
BH CV LOWER VASCULAR LEFT DISTAL FEMORAL THROMBUS: NORMAL
BH CV LOWER VASCULAR LEFT GASTRONEMIUS COMPRESS: NORMAL
BH CV LOWER VASCULAR LEFT GASTRONEMIUS THROMBUS: NORMAL
BH CV LOWER VASCULAR LEFT GREATER SAPH AK COMPRESS: NORMAL
BH CV LOWER VASCULAR LEFT GREATER SAPH BK COMPRESS: NORMAL
BH CV LOWER VASCULAR LEFT LESSER SAPH COMPRESS: NORMAL
BH CV LOWER VASCULAR LEFT MID FEMORAL AUGMENT: NORMAL
BH CV LOWER VASCULAR LEFT MID FEMORAL COMPETENT: NORMAL
BH CV LOWER VASCULAR LEFT MID FEMORAL COMPRESS: NORMAL
BH CV LOWER VASCULAR LEFT MID FEMORAL PHASIC: NORMAL
BH CV LOWER VASCULAR LEFT MID FEMORAL SPONT: NORMAL
BH CV LOWER VASCULAR LEFT MID FEMORAL THROMBUS: NORMAL
BH CV LOWER VASCULAR LEFT PERONEAL COMPRESS: NORMAL
BH CV LOWER VASCULAR LEFT POPLITEAL AUGMENT: NORMAL
BH CV LOWER VASCULAR LEFT POPLITEAL COMPETENT: NORMAL
BH CV LOWER VASCULAR LEFT POPLITEAL COMPRESS: NORMAL
BH CV LOWER VASCULAR LEFT POPLITEAL PHASIC: NORMAL
BH CV LOWER VASCULAR LEFT POPLITEAL SPONT: NORMAL
BH CV LOWER VASCULAR LEFT POPLITEAL THROMBUS: NORMAL
BH CV LOWER VASCULAR LEFT POSTERIOR TIBIAL COMPRESS: NORMAL
BH CV LOWER VASCULAR LEFT PROXIMAL FEMORAL AUGMENT: NORMAL
BH CV LOWER VASCULAR LEFT PROXIMAL FEMORAL COMPETENT: NORMAL
BH CV LOWER VASCULAR LEFT PROXIMAL FEMORAL COMPRESS: NORMAL
BH CV LOWER VASCULAR LEFT PROXIMAL FEMORAL PHASIC: NORMAL
BH CV LOWER VASCULAR LEFT PROXIMAL FEMORAL SPONT: NORMAL
BH CV LOWER VASCULAR LEFT PROXIMAL FEMORAL THROMBUS: NORMAL
BH CV LOWER VASCULAR LEFT SAPHENOFEMORAL JUNCTION COMPRESS: NORMAL
BH CV LOWER VASCULAR RIGHT COMMON FEMORAL AUGMENT: NORMAL
BH CV LOWER VASCULAR RIGHT COMMON FEMORAL COMPETENT: NORMAL
BH CV LOWER VASCULAR RIGHT COMMON FEMORAL COMPRESS: NORMAL
BH CV LOWER VASCULAR RIGHT COMMON FEMORAL PHASIC: NORMAL
BH CV LOWER VASCULAR RIGHT COMMON FEMORAL SPONT: NORMAL
BH CV VAS PRELIMINARY FINDINGS SCRIPTING: 1
INR PPP: 4.1 (ref 2–3)

## 2024-04-22 PROCEDURE — 93971 EXTREMITY STUDY: CPT | Performed by: STUDENT IN AN ORGANIZED HEALTH CARE EDUCATION/TRAINING PROGRAM

## 2024-04-22 PROCEDURE — 93971 EXTREMITY STUDY: CPT

## 2024-04-22 PROCEDURE — 36416 COLLJ CAPILLARY BLOOD SPEC: CPT | Performed by: NURSE PRACTITIONER

## 2024-04-22 PROCEDURE — 85610 PROTHROMBIN TIME: CPT | Performed by: NURSE PRACTITIONER

## 2024-04-22 RX ORDER — WARFARIN SODIUM 5 MG/1
TABLET ORAL
Qty: 30 TABLET | Refills: 2 | Status: SHIPPED | OUTPATIENT
Start: 2024-04-22

## 2024-04-22 NOTE — TELEPHONE ENCOUNTER
Rx Refill Note  Requested Prescriptions     Pending Prescriptions Disp Refills    warfarin (COUMADIN) 5 MG tablet [Pharmacy Med Name: Warfarin Sodium 5 MG Oral Tablet] 30 tablet 2     Sig: Take 1 tab, by mouth, daily except 1/2 tab on Mondays or as directed.    Last INR 4/15/24  Last office visit with prescribing clinician: Visit date not found   Last telemedicine visit with prescribing clinician: Visit date not found   Next office visit with prescribing clinician: Visit date not found                         Would you like a call back once the refill request has been completed: [] Yes [] No    If the office needs to give you a call back, can they leave a voicemail: [] Yes [] No    Анна Church RN  04/22/24, 09:41 EDT

## 2024-04-22 NOTE — TELEPHONE ENCOUNTER
Received a phone call from the patient regarding current symptoms he is experiencing. He stated he had his INR checked today and it was 4.1. Warfarin and INR managed by cardiology. Patient stated he was advised by his cardiologist office to contact our office regarding his symptoms of pain, splotchy redness and swelling of the left leg and foot. Patient stated he is experiencing these symptoms from foot to groin. Informed patient that we will speak with Dr. Ramírez then we will give him a callback regarding what he advises. Patient confirmed.     Per Dr. Ramírez, patient to have STAT doppler of LLE completed today due to current symptoms. Contact central scheduling and was informed to advise patient to go to [facility] right now to have the doppler completed. Informed Phylicia,  of Dr. Ramírez's phone number for the STAT results to be reported to.     Contacted Mr. Brothers and informed him that Dr. Ramírez is wanting him to have a doppler completed today to assess his current symptoms. Patient confirmed. I advised he go to [facility] at this time to have the doppler completed. I verified if he is able to go to [facility] at this time to have the scan completed; he confirmed. I informed him that the results will be called to Dr. Ramírez once the scan is completed. I advised him to contact our office if he has any further questions/concerns. He confirmed.

## 2024-04-23 ENCOUNTER — PATIENT MESSAGE (OUTPATIENT)
Dept: CARDIOLOGY | Facility: CLINIC | Age: 54
End: 2024-04-23
Payer: MEDICAID

## 2024-04-23 ENCOUNTER — TELEPHONE (OUTPATIENT)
Dept: CARDIOLOGY | Facility: CLINIC | Age: 54
End: 2024-04-23
Payer: MEDICAID

## 2024-04-23 DIAGNOSIS — I82.402 RECURRENT ACUTE DEEP VEIN THROMBOSIS (DVT) OF LEFT LOWER EXTREMITY: Primary | ICD-10-CM

## 2024-04-23 NOTE — TELEPHONE ENCOUNTER
Sent update via [facility] which was the original way patient contacted us. Will continue to watch for any reply's.

## 2024-04-23 NOTE — TELEPHONE ENCOUNTER
From: Spencer Brothers  To: Mery Ca  Sent: 4/23/2024 10:10 AM EDT  Subject: Question    Hi Mery, i am in alot of pain my leg and foot are so swollen its hard to get pants and shoes on. I don't feel like the cancer dr is doing anything to help solve the problem. Im not sure how much more i can take. He took blood and Said i will see you in 4 months. I would like some answers but no one is telling me anything. Is there another doctor you can recommend me to. The pain is sever keeping me up at night. Im also really worried.   Thanks!

## 2024-04-23 NOTE — TELEPHONE ENCOUNTER
Dr. To would like to refer the patient to vascular surgery, Dr. Arguelles. Referral ordered. Please advise patient.

## 2024-04-23 NOTE — TELEPHONE ENCOUNTER
Gonzalo Ordonez, i am in alot of pain my leg and foot are so swollen its hard to get pants and shoes on. I don't feel like the cancer dr is doing anything to help solve the problem. Im not sure how much more i can take. He took blood and Said i will see you in 4 months. I would like some answers but no one is telling me anything. Is there another doctor you can recommend me to. The pain is sever keeping me up at night. Im also really worried.   Thanks!

## 2024-04-29 ENCOUNTER — ANTICOAGULATION VISIT (OUTPATIENT)
Dept: CARDIOLOGY | Facility: CLINIC | Age: 54
End: 2024-04-29
Payer: MEDICAID

## 2024-04-29 VITALS
DIASTOLIC BLOOD PRESSURE: 85 MMHG | HEART RATE: 85 BPM | WEIGHT: 194 LBS | BODY MASS INDEX: 29.07 KG/M2 | SYSTOLIC BLOOD PRESSURE: 124 MMHG

## 2024-04-29 DIAGNOSIS — Z79.01 LONG TERM (CURRENT) USE OF ANTICOAGULANTS: ICD-10-CM

## 2024-04-29 DIAGNOSIS — I82.412 ACUTE DEEP VEIN THROMBOSIS (DVT) OF FEMORAL VEIN OF LEFT LOWER EXTREMITY: Primary | ICD-10-CM

## 2024-04-29 LAB — INR PPP: 2.7 (ref 2.5–3.5)

## 2024-04-29 PROCEDURE — 36416 COLLJ CAPILLARY BLOOD SPEC: CPT | Performed by: NURSE PRACTITIONER

## 2024-04-29 PROCEDURE — 85610 PROTHROMBIN TIME: CPT | Performed by: NURSE PRACTITIONER

## 2024-05-06 ENCOUNTER — ANTICOAGULATION VISIT (OUTPATIENT)
Dept: CARDIOLOGY | Facility: CLINIC | Age: 54
End: 2024-05-06
Payer: MEDICAID

## 2024-05-06 VITALS
DIASTOLIC BLOOD PRESSURE: 99 MMHG | HEART RATE: 83 BPM | BODY MASS INDEX: 28.62 KG/M2 | SYSTOLIC BLOOD PRESSURE: 137 MMHG | WEIGHT: 191 LBS

## 2024-05-06 DIAGNOSIS — Z79.01 LONG TERM (CURRENT) USE OF ANTICOAGULANTS: ICD-10-CM

## 2024-05-06 DIAGNOSIS — I82.412 ACUTE DEEP VEIN THROMBOSIS (DVT) OF FEMORAL VEIN OF LEFT LOWER EXTREMITY: Primary | ICD-10-CM

## 2024-05-06 LAB — INR PPP: 2.6 (ref 2.5–3.5)

## 2024-05-06 PROCEDURE — 36416 COLLJ CAPILLARY BLOOD SPEC: CPT | Performed by: NURSE PRACTITIONER

## 2024-05-06 PROCEDURE — 85610 PROTHROMBIN TIME: CPT | Performed by: NURSE PRACTITIONER

## 2024-05-06 RX ORDER — CHLORTHALIDONE 25 MG/1
25 TABLET ORAL DAILY
Qty: 30 TABLET | Refills: 0 | Status: SHIPPED | OUTPATIENT
Start: 2024-05-06

## 2024-05-20 ENCOUNTER — OFFICE VISIT (OUTPATIENT)
Age: 54
End: 2024-05-20
Payer: MEDICAID

## 2024-05-20 VITALS
BODY MASS INDEX: 28.29 KG/M2 | DIASTOLIC BLOOD PRESSURE: 96 MMHG | SYSTOLIC BLOOD PRESSURE: 128 MMHG | HEIGHT: 69 IN | WEIGHT: 191 LBS

## 2024-05-20 DIAGNOSIS — R60.0 LOCAL EDEMA: ICD-10-CM

## 2024-05-20 DIAGNOSIS — I87.2 VENOUS INSUFFICIENCY: ICD-10-CM

## 2024-05-20 DIAGNOSIS — I87.002 POST-THROMBOTIC SYNDROME OF LEFT LOWER EXTREMITY: ICD-10-CM

## 2024-05-20 DIAGNOSIS — I82.412 ACUTE DEEP VEIN THROMBOSIS (DVT) OF FEMORAL VEIN OF LEFT LOWER EXTREMITY: Primary | ICD-10-CM

## 2024-05-20 RX ORDER — NADOLOL 40 MG/1
40 TABLET ORAL NIGHTLY
COMMUNITY
Start: 2024-04-18

## 2024-05-20 RX ORDER — WARFARIN SODIUM 5 MG/1
5 TABLET ORAL DAILY
COMMUNITY
Start: 2024-03-26

## 2024-05-20 NOTE — PROGRESS NOTES
"Chief Complaint  No chief complaint on file.    Subjective        Spencer Brothers presents to [facility] VASCULAR SURGERY  History of Present Illness  Left leg swelling related to a DVT.  Objective   Vital Signs:  /96 (BP Location: Left arm, Patient Position: Sitting, Cuff Size: Adult)   Ht 174 cm (68.5\")   Wt 86.6 kg (191 lb)   BMI 28.62 kg/m²   Estimated body mass index is 28.62 kg/m² as calculated from the following:    Height as of this encounter: 174 cm (68.5\").    Weight as of this encounter: 86.6 kg (191 lb).     BMI is >= 25 and <30. (Overweight) The following options were offered after discussion;: Information on healthy weight added to patient's after visit summary.    Spencer Brothers  reports that he has never smoked. He has never used smokeless tobacco.        Physical Exam  Constitutional:       Appearance: He is well-developed.   Pulmonary:      Effort: Pulmonary effort is normal. No respiratory distress.   Abdominal:      General: There is no distension.      Palpations: Abdomen is soft.   Neurological:      Mental Status: He is alert and oriented to person, place, and time.     Significant left leg swelling predominantly in the calf.  Result Review :    The following data was reviewed by: Ty Reyez MD on 05/20/2024:  CBC          3/25/2024    14:35 4/4/2024    08:52 4/12/2024    08:38   CBC   WBC 6.35  6.05  7.55    RBC 4.63  4.63  4.96    Hemoglobin 15.2  15.7  16.5    Hematocrit 45.1  45.4  49.5    MCV 97.4  98.1  99.8    MCH 32.8  33.9  33.3    MCHC 33.7  34.6  33.3    RDW 13.1  13.5  14.6    Platelets 160  193  133       BMP          3/1/2024    08:40 3/25/2024    14:35 4/17/2024    11:34   BMP   BUN 13  10  20    Creatinine 1.16  1.12  1.24    Sodium 141  140  138    Potassium 4.0  4.0  3.7    Chloride 100  102  101    CO2 29.0  30.0  25.0    Calcium 9.4  8.7  9.1       A1C Last 3 Results          8/14/2023    11:47   HGBA1C Last 3 Results   Hemoglobin A1C " 5.10        Data reviewed : Cardiology studies venous duplexes and percutaneous mechanical thrombectomy images and report.  and Consultant notes hematology/oncology's progress note  Vascular Surgical History:  2/5/2024: Mechanical thrombectomy of the left leg with interventional cardiology  3/8/2024: Attempted percutaneous thrombectomy with noncompressible veins aborted procedure done by interventional cardiology.    Vascular Imaging History:  Left lower extremity venous duplex:  2/28/2024: Acute DVT in the left leg extending up to the common femoral vein.  3/8/2024: Acute DVT extending up to the entire left femoral vein.  4/24/2024: Acute DVT extending to the proximal femoral vein.    2/16/2024: CT PE protocol:Impression:  Suboptimal opacification of the pulmonary vasculature. No central pulmonary embolism visualized. Cannot evaluate the segmental and subsegmental branches. If clinical warranted, correlate with VQ scan.  No evidence of aortic aneurysm or dissection.  No acute intrathoracic process evident.           Assessment and Plan     Vascular surgery following for:  DVT.    Diagnoses and all orders for this visit:    1. Acute deep vein thrombosis (DVT) of femoral vein of left lower extremity (Primary)  -     Duplex Venous Lower Extremity - Left CAR; Future    2. Venous insufficiency    3. Local edema    4. Post-thrombotic syndrome of left lower extremity    Patient with significant left leg swelling following recurrent thrombosis of left leg percutaneous mechanical thrombectomy with interventional cardiology.  Venous duplex as of April still showed persistent thrombus of the acute nature in the femoral vein.  Currently the patient has moderate to moderately severe swelling.  There is no overlying skin changes or concerns for eminent venous ulcerations.  I recommend continuing therapeutic anticoagulation.  Will see him back in 6 months time with repeat duplex.  Would consider him for diagnostic venogram if his  symptoms remain severe and there is only chronic disease left on duplex.     Vascular medical management:Patient is to continue warfarin with INR management with hematology oncology.  Follow Up     Return in about 6 months (around 11/20/2024) for Follow up with vascular ultrasound.  Patient was given instructions and counseling regarding his condition or for health maintenance advice. Please see specific information pulled into the AVS if appropriate.

## 2024-05-20 NOTE — PATIENT INSTRUCTIONS
Nutrition and Heart Health  In this video, you'll learn why nutrition is an important part of a healthy lifestyle, especially if you have heart disease.  To view the content, go to this web address:  [email or web address]    This video will  on: 2026. If you need access to this video following this date, please reach out to the healthcare provider who assigned it to you.  This information is not intended to replace advice given to you by your health care provider. Make sure you discuss any questions you have with your health care provider.  Elsevier Patient Education ©  Elsevier Inc.

## 2024-05-24 ENCOUNTER — ANTICOAGULATION VISIT (OUTPATIENT)
Dept: CARDIOLOGY | Facility: CLINIC | Age: 54
End: 2024-05-24
Payer: MEDICAID

## 2024-05-24 VITALS
SYSTOLIC BLOOD PRESSURE: 136 MMHG | HEART RATE: 82 BPM | DIASTOLIC BLOOD PRESSURE: 96 MMHG | BODY MASS INDEX: 29.36 KG/M2 | WEIGHT: 196 LBS

## 2024-05-24 DIAGNOSIS — I82.412 ACUTE DEEP VEIN THROMBOSIS (DVT) OF FEMORAL VEIN OF LEFT LOWER EXTREMITY: Primary | ICD-10-CM

## 2024-05-24 DIAGNOSIS — Z79.01 LONG TERM (CURRENT) USE OF ANTICOAGULANTS: ICD-10-CM

## 2024-05-24 LAB — INR PPP: 2.7 (ref 2.5–3.5)

## 2024-06-04 RX ORDER — CHLORTHALIDONE 25 MG/1
25 TABLET ORAL DAILY
Qty: 90 TABLET | Refills: 0 | Status: SHIPPED | OUTPATIENT
Start: 2024-06-04

## 2024-06-04 NOTE — TELEPHONE ENCOUNTER
Rx Refill Note  Requested Prescriptions     Pending Prescriptions Disp Refills    chlorthalidone (HYGROTON) 25 MG tablet [Pharmacy Med Name: Chlorthalidone 25 MG Oral Tablet] 90 tablet 0     Sig: Take 1 tablet by mouth once daily      Last office visit with prescribing clinician: Dr. To 3/18/24  Last telemedicine visit with prescribing clinician: Visit date not found   Next office visit with prescribing clinician: Dr. To 8/19/24                        Would you like a call back once the refill request has been completed: [] Yes [] No    If the office needs to give you a call back, can they leave a voicemail: [] Yes [] No    Lilibeth Sosa MA  06/04/24, 08:09 EDT

## 2024-06-21 ENCOUNTER — TELEPHONE (OUTPATIENT)
Dept: CARDIOLOGY | Facility: CLINIC | Age: 54
End: 2024-06-21

## 2024-06-21 ENCOUNTER — ANTICOAGULATION VISIT (OUTPATIENT)
Dept: CARDIOLOGY | Facility: CLINIC | Age: 54
End: 2024-06-21
Payer: MEDICAID

## 2024-06-21 VITALS
BODY MASS INDEX: 28.77 KG/M2 | SYSTOLIC BLOOD PRESSURE: 107 MMHG | HEART RATE: 92 BPM | DIASTOLIC BLOOD PRESSURE: 72 MMHG | WEIGHT: 192 LBS

## 2024-06-21 DIAGNOSIS — Z79.01 LONG TERM (CURRENT) USE OF ANTICOAGULANTS: ICD-10-CM

## 2024-06-21 DIAGNOSIS — I82.412 ACUTE DEEP VEIN THROMBOSIS (DVT) OF FEMORAL VEIN OF LEFT LOWER EXTREMITY: Primary | ICD-10-CM

## 2024-06-21 LAB — INR PPP: 2.3 (ref 2.5–3.5)

## 2024-06-21 NOTE — TELEPHONE ENCOUNTER
Pt in for protime today he complains of reoccurring dizziness accompanied by lethargy, impending doom feeling, difficulty walking due to leg weakness, and some difficulty speech. When reviewing pt med list his neurologist restarted him on nadolol for migraines. The patient is taking both nadolol and Carvedilol.       Please advise

## 2024-06-21 NOTE — TELEPHONE ENCOUNTER
I placed a call to patient's [facility] pharmacy and spoke with Mahnaz (pharmacy staff) and she stated that patient has refills for his Crestor, but that patient has to wait until Monday 6/24 to  the Rx.  Called patient and he denies losing/missing any pills and stated he will  his Crestor refill on 6/24.

## 2024-06-21 NOTE — TELEPHONE ENCOUNTER
Pt in for protime today he needs a refill on his Crestor sent to [facility] in [place]. They have not sent a request pt has been waiting on medication and is completely out of medication.

## 2024-06-21 NOTE — TELEPHONE ENCOUNTER
Please advise patient to stop taking nadolol. Has he been monitoring his blood pressure and heart rate? If so, does he have a log to report to us? If his symptoms persist or worsen, he should go to the ER for further evaluation.

## 2024-06-21 NOTE — TELEPHONE ENCOUNTER
Pt has not kept a log. He will stop Nadolol and he will keep a log of BP and HR. Advised if stopping Nadolol does not resolve this issue and he continues to have the symptoms listed below then he needs to go to the ER pt verbalizes understanding apLPN

## 2024-07-16 ENCOUNTER — TELEPHONE (OUTPATIENT)
Dept: ONCOLOGY | Facility: CLINIC | Age: 54
End: 2024-07-16
Payer: MEDICAID

## 2024-07-16 NOTE — TELEPHONE ENCOUNTER
Called to R/s Pt appt on 8/14, PROVIDER OUT OF OFFICE THIS DAY. Pt stated he has no INS at the moment, so he will call back to r/s this appt. I v/u

## 2024-07-23 DIAGNOSIS — I82.412 ACUTE DEEP VEIN THROMBOSIS (DVT) OF FEMORAL VEIN OF LEFT LOWER EXTREMITY: ICD-10-CM

## 2024-07-23 RX ORDER — WARFARIN SODIUM 5 MG/1
TABLET ORAL
Qty: 75 TABLET | Refills: 0 | Status: SHIPPED | OUTPATIENT
Start: 2024-07-23

## 2024-08-05 RX ORDER — ICOSAPENT ETHYL 1 G/1
2 CAPSULE ORAL 2 TIMES DAILY WITH MEALS
Qty: 120 CAPSULE | Refills: 3 | Status: SHIPPED | OUTPATIENT
Start: 2024-08-05

## 2024-08-05 NOTE — TELEPHONE ENCOUNTER
Rx Refill Note  Requested Prescriptions     Pending Prescriptions Disp Refills    icosapent ethyl (VASCEPA) 1 g capsule capsule [Pharmacy Med Name: Icosapent Ethyl 1 GM Oral Capsule] 120 capsule 3     Sig: TAKE 2 CAPSULES BY MOUTH TWICE DAILY WITH MEALS      Last office visit with prescribing clinician: 3/18/2024   Last telemedicine visit with prescribing clinician: Visit date not found   Next office visit with prescribing clinician: Visit date not found                         Would you like a call back once the refill request has been completed: [] Yes [] No    If the office needs to give you a call back, can they leave a voicemail: [] Yes [] No    Deedee Stovall MA  08/05/24, 14:06 EDT

## 2024-09-03 RX ORDER — CHLORTHALIDONE 25 MG/1
25 TABLET ORAL DAILY
Qty: 90 TABLET | Refills: 0 | Status: SHIPPED | OUTPATIENT
Start: 2024-09-03

## 2025-03-19 ENCOUNTER — TELEPHONE (OUTPATIENT)
Dept: CARDIOLOGY | Facility: CLINIC | Age: 55
End: 2025-03-19
Payer: COMMERCIAL

## 2025-03-19 NOTE — TELEPHONE ENCOUNTER
Called patient, RODRIGUEZ, patient has not been seen in office or had an INR for quite some time. Patient needs to either make an appt or let us know if he is seeing another cardiologist for his INR.

## 2025-03-24 NOTE — TELEPHONE ENCOUNTER
Left follow up message for patient regarding office visit & INR. I asked him to return our call to let us know if he was seeing another Cardiologist. Abad

## 2025-03-25 NOTE — TELEPHONE ENCOUNTER
Called patient to leave another message and voicemail is full. Will send to have a letter mailed to patient. [email or web address]

## 2025-03-31 ENCOUNTER — HOSPITAL ENCOUNTER (EMERGENCY)
Facility: HOSPITAL | Age: 55
Discharge: HOME | End: 2025-03-31
Attending: EMERGENCY MEDICINE | Admitting: EMERGENCY MEDICINE
Payer: COMMERCIAL

## 2025-03-31 ENCOUNTER — APPOINTMENT (OUTPATIENT)
Dept: GENERAL RADIOLOGY | Facility: HOSPITAL | Age: 55
End: 2025-03-31
Payer: COMMERCIAL

## 2025-03-31 VITALS
TEMPERATURE: 97.1 F | OXYGEN SATURATION: 98 % | HEART RATE: 78 BPM | BODY MASS INDEX: 26.07 KG/M2 | DIASTOLIC BLOOD PRESSURE: 106 MMHG | SYSTOLIC BLOOD PRESSURE: 157 MMHG | WEIGHT: 172 LBS | HEIGHT: 68 IN | RESPIRATION RATE: 18 BRPM

## 2025-03-31 DIAGNOSIS — S90.32XA CONTUSION OF LEFT FOOT, INITIAL ENCOUNTER: Primary | ICD-10-CM

## 2025-03-31 PROCEDURE — 99283 EMERGENCY DEPT VISIT LOW MDM: CPT

## 2025-03-31 PROCEDURE — 73630 X-RAY EXAM OF FOOT: CPT

## 2025-03-31 NOTE — Clinical Note
[facility] EMERGENCY DEPARTMENT  [address] [place] [address]  Phone: [phone]    Spencer Brothers was seen and treated in our emergency department on 3/31/2025.  He may return to work on 04/01/2025.  Light Duty       Thank you for choosing [facility].    José Jeffries MD
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
## 2025-03-31 NOTE — DISCHARGE INSTRUCTIONS
Postop shoe.  Elevate ice packs.  Light duty at work no excessive walking or standing.  May have sitdown duty.  Follow-up podiatry 1 week for recheck and repeat x-rays or advanced imaging if still having pain and swelling to rule out occult fracture.  Tylenol for pain.  Return for fever redness swelling in the leg red streaks cold discolored foot or any other new or worse problems or concerns return immediately to ER.

## 2025-04-04 ENCOUNTER — TELEPHONE (OUTPATIENT)
Dept: CARDIOLOGY | Facility: CLINIC | Age: 55
End: 2025-04-04
Payer: COMMERCIAL

## 2025-04-04 ENCOUNTER — TELEPHONE (OUTPATIENT)
Age: 55
End: 2025-04-04
Payer: COMMERCIAL

## 2025-04-04 NOTE — TELEPHONE ENCOUNTER
Caller: Spencer Brothers    Relationship to patient: Self    Best call back number: [phone]    Chief complaint: NA    Type of visit: OVERDUE 3 MONTH FU    Requested date: ASAP     If rescheduling, when is the original appointment: NA     Additional notes:PATIENT WAS LAST SEEN IN 03.2024 AND PER OFFICE NOTES NEEDED FU IN 06.2024 BUT LOST INSURANCE AND NEEDS TO SCHEDULE AN OVERDUE FU PLEASE CALL AND ADVISE.

## 2025-04-08 ENCOUNTER — TELEPHONE (OUTPATIENT)
Age: 55
End: 2025-04-08
Payer: COMMERCIAL

## 2025-04-08 NOTE — TELEPHONE ENCOUNTER
I called and spoke to patient and advised him he will need to discuss with Dr Zaldivar at his 4/10/25 appt.  He expressed an understanding.

## 2025-04-08 NOTE — TELEPHONE ENCOUNTER
Caller: BLAKE FUNES     Relationship: PATIENT     Best call back number: [phone]    What form or medical record are you requesting: WORK NOTE    Who is requesting this form or medical record from you: EMPLOYER     How would you like to receive the form or medical records (pick-up, mail, fax):UPLOAD TO MY CHART       Timeframe paperwork needed: ASAP - PATIENT SCHEDULED 04/10/2025 INDICATING HE NEEDS WORK NOTE ASAP     Additional notes: PATIENT WILL BE NEW PATIENT - I ENCOURAGED PATIENT TO FOLLOW UP WITH ED INDICATING HE IS NOT ESTABLISHED WITH DR MERINO.  PATIENT STATES HE TALKED TO DR MERINO AT THE ED.  A WORK NOTE FROM ED WAS PROVIDED TO PATIENT 03/31/2025.  INDICATING PATIENT CAN RETURN TO WORK 04/01/2025 - LIGHT DUTY.  PATIENT STATES HE HAS BEEN BACK TO WORK THIS WEEK AND REQUIRED TO BE ON HIS FEET THE ENTIRE SHIFT .    DX Contusion of left foot; 1 WEEK FOLLOW UP FROM ER, IMAGING IN CHART

## 2025-04-10 ENCOUNTER — PATIENT ROUNDING (BHMG ONLY) (OUTPATIENT)
Age: 55
End: 2025-04-10
Payer: COMMERCIAL

## 2025-04-10 ENCOUNTER — OFFICE VISIT (OUTPATIENT)
Age: 55
End: 2025-04-10
Payer: COMMERCIAL

## 2025-04-10 VITALS — HEART RATE: 99 BPM | BODY MASS INDEX: 26.95 KG/M2 | WEIGHT: 177.8 LBS | OXYGEN SATURATION: 98 % | HEIGHT: 68 IN

## 2025-04-10 DIAGNOSIS — S90.31XA CONTUSION OF RIGHT FOOT, INITIAL ENCOUNTER: ICD-10-CM

## 2025-04-10 DIAGNOSIS — M79.671 RIGHT FOOT PAIN: Primary | ICD-10-CM

## 2025-04-10 DIAGNOSIS — M10.071 ACUTE IDIOPATHIC GOUT OF RIGHT FOOT: ICD-10-CM

## 2025-04-10 RX ORDER — METHYLPREDNISOLONE 4 MG/1
TABLET ORAL
Qty: 21 TABLET | Refills: 0 | Status: SHIPPED | OUTPATIENT
Start: 2025-04-10
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
## 2025-04-30 ENCOUNTER — APPOINTMENT (OUTPATIENT)
Dept: GENERAL RADIOLOGY | Facility: HOSPITAL | Age: 55
End: 2025-04-30
Payer: COMMERCIAL

## 2025-04-30 ENCOUNTER — TELEPHONE (OUTPATIENT)
Dept: CARDIOLOGY | Facility: CLINIC | Age: 55
End: 2025-04-30
Payer: COMMERCIAL

## 2025-04-30 ENCOUNTER — HOSPITAL ENCOUNTER (OUTPATIENT)
Facility: HOSPITAL | Age: 55
Setting detail: OBSERVATION
Discharge: HOME | End: 2025-05-01
Attending: EMERGENCY MEDICINE | Admitting: EMERGENCY MEDICINE
Payer: COMMERCIAL

## 2025-04-30 DIAGNOSIS — R11.12 PROJECTILE VOMITING WITHOUT NAUSEA: ICD-10-CM

## 2025-04-30 DIAGNOSIS — R07.9 CHEST PAIN, UNSPECIFIED TYPE: ICD-10-CM

## 2025-04-30 DIAGNOSIS — R06.09 DYSPNEA ON EXERTION: Primary | ICD-10-CM

## 2025-04-30 DIAGNOSIS — M54.2 NECK PAIN: ICD-10-CM

## 2025-04-30 LAB
ALBUMIN SERPL-MCNC: 4.8 G/DL (ref 3.5–5.2)
ALBUMIN/GLOB SERPL: 1.4 G/DL
ALP SERPL-CCNC: 108 U/L (ref 39–117)
ALT SERPL W P-5'-P-CCNC: 37 U/L (ref 1–41)
ANION GAP SERPL CALCULATED.3IONS-SCNC: 14.7 MMOL/L (ref 5–15)
AST SERPL-CCNC: 27 U/L (ref 1–40)
B PARAPERT DNA SPEC QL NAA+PROBE: NOT DETECTED
B PERT DNA SPEC QL NAA+PROBE: NOT DETECTED
BACTERIA UR QL AUTO: NORMAL /HPF
BASOPHILS # BLD AUTO: 0.03 10*3/MM3 (ref 0–0.2)
BASOPHILS NFR BLD AUTO: 0.5 % (ref 0–1.5)
BILIRUB SERPL-MCNC: 0.5 MG/DL (ref 0–1.2)
BILIRUB UR QL STRIP: NEGATIVE
BUN SERPL-MCNC: 22 MG/DL (ref 6–20)
BUN/CREAT SERPL: 18.6 (ref 7–25)
C PNEUM DNA NPH QL NAA+NON-PROBE: NOT DETECTED
CALCIUM SPEC-SCNC: 10.6 MG/DL (ref 8.6–10.5)
CHLORIDE SERPL-SCNC: 99 MMOL/L (ref 98–107)
CLARITY UR: CLEAR
CO2 SERPL-SCNC: 22.3 MMOL/L (ref 22–29)
COLOR UR: YELLOW
CREAT SERPL-MCNC: 1.18 MG/DL (ref 0.76–1.27)
D DIMER PPP FEU-MCNC: <0.27 MCGFEU/ML (ref 0–0.53)
DEPRECATED RDW RBC AUTO: 42.2 FL (ref 37–54)
EGFRCR SERPLBLD CKD-EPI 2021: 73.8 ML/MIN/1.73
EOSINOPHIL # BLD AUTO: 0.11 10*3/MM3 (ref 0–0.4)
EOSINOPHIL NFR BLD AUTO: 1.7 % (ref 0.3–6.2)
ERYTHROCYTE [DISTWIDTH] IN BLOOD BY AUTOMATED COUNT: 13.1 % (ref 12.3–15.4)
FLUAV SUBTYP SPEC NAA+PROBE: NOT DETECTED
FLUBV RNA ISLT QL NAA+PROBE: NOT DETECTED
GEN 5 1HR TROPONIN T REFLEX: 9 NG/L
GLOBULIN UR ELPH-MCNC: 3.5 GM/DL
GLUCOSE SERPL-MCNC: 104 MG/DL (ref 65–99)
GLUCOSE UR STRIP-MCNC: NEGATIVE MG/DL
HADV DNA SPEC NAA+PROBE: NOT DETECTED
HCOV 229E RNA SPEC QL NAA+PROBE: NOT DETECTED
HCOV HKU1 RNA SPEC QL NAA+PROBE: NOT DETECTED
HCOV NL63 RNA SPEC QL NAA+PROBE: NOT DETECTED
HCOV OC43 RNA SPEC QL NAA+PROBE: NOT DETECTED
HCT VFR BLD AUTO: 43.4 % (ref 37.5–51)
HGB BLD-MCNC: 15.1 G/DL (ref 13–17.7)
HGB UR QL STRIP.AUTO: NEGATIVE
HMPV RNA NPH QL NAA+NON-PROBE: NOT DETECTED
HPIV1 RNA ISLT QL NAA+PROBE: NOT DETECTED
HPIV2 RNA SPEC QL NAA+PROBE: NOT DETECTED
HPIV3 RNA NPH QL NAA+PROBE: NOT DETECTED
HPIV4 P GENE NPH QL NAA+PROBE: NOT DETECTED
HYALINE CASTS UR QL AUTO: NORMAL /LPF
IMM GRANULOCYTES # BLD AUTO: 0.03 10*3/MM3 (ref 0–0.05)
IMM GRANULOCYTES NFR BLD AUTO: 0.5 % (ref 0–0.5)
INR PPP: 1.06 (ref 0.9–1.1)
KETONES UR QL STRIP: ABNORMAL
LEUKOCYTE ESTERASE UR QL STRIP.AUTO: NEGATIVE
LYMPHOCYTES # BLD AUTO: 2 10*3/MM3 (ref 0.7–3.1)
LYMPHOCYTES NFR BLD AUTO: 30.9 % (ref 19.6–45.3)
M PNEUMO IGG SER IA-ACNC: NOT DETECTED
MAGNESIUM SERPL-MCNC: 2 MG/DL (ref 1.6–2.6)
MCH RBC QN AUTO: 30.7 PG (ref 26.6–33)
MCHC RBC AUTO-ENTMCNC: 34.8 G/DL (ref 31.5–35.7)
MCV RBC AUTO: 88.2 FL (ref 79–97)
MONOCYTES # BLD AUTO: 0.66 10*3/MM3 (ref 0.1–0.9)
MONOCYTES NFR BLD AUTO: 10.2 % (ref 5–12)
NEUTROPHILS NFR BLD AUTO: 3.64 10*3/MM3 (ref 1.7–7)
NEUTROPHILS NFR BLD AUTO: 56.2 % (ref 42.7–76)
NITRITE UR QL STRIP: NEGATIVE
NRBC BLD AUTO-RTO: 0 /100 WBC (ref 0–0.2)
NT-PROBNP SERPL-MCNC: 150 PG/ML (ref 0–900)
PH UR STRIP.AUTO: <=5 [PH] (ref 5–8)
PHOSPHATE SERPL-MCNC: 3.8 MG/DL (ref 2.5–4.5)
PLATELET # BLD AUTO: 251 10*3/MM3 (ref 140–450)
PMV BLD AUTO: 9.6 FL (ref 6–12)
POTASSIUM SERPL-SCNC: 3.5 MMOL/L (ref 3.5–5.2)
PROT SERPL-MCNC: 8.3 G/DL (ref 6–8.5)
PROT UR QL STRIP: ABNORMAL
PROTHROMBIN TIME: 13.7 SECONDS (ref 11.7–14.2)
RBC # BLD AUTO: 4.92 10*6/MM3 (ref 4.14–5.8)
RBC # UR STRIP: NORMAL /HPF
REF LAB TEST METHOD: NORMAL
RHINOVIRUS RNA SPEC NAA+PROBE: NOT DETECTED
RSV RNA NPH QL NAA+NON-PROBE: NOT DETECTED
SARS-COV-2 RNA RESP QL NAA+PROBE: NOT DETECTED
SODIUM SERPL-SCNC: 136 MMOL/L (ref 136–145)
SP GR UR STRIP: 1.02 (ref 1–1.03)
SQUAMOUS #/AREA URNS HPF: NORMAL /HPF
TROPONIN T NUMERIC DELTA: -1 NG/L
TROPONIN T SERPL HS-MCNC: 10 NG/L
UROBILINOGEN UR QL STRIP: ABNORMAL
VALPROATE SERPL-MCNC: <2.8 MCG/ML (ref 50–125)
WBC # UR STRIP: NORMAL /HPF
WBC NRBC COR # BLD AUTO: 6.47 10*3/MM3 (ref 3.4–10.8)

## 2025-04-30 PROCEDURE — 99285 EMERGENCY DEPT VISIT HI MDM: CPT

## 2025-04-30 PROCEDURE — 80164 ASSAY DIPROPYLACETIC ACD TOT: CPT

## 2025-04-30 PROCEDURE — 85610 PROTHROMBIN TIME: CPT

## 2025-04-30 PROCEDURE — 85025 COMPLETE CBC W/AUTO DIFF WBC: CPT

## 2025-04-30 PROCEDURE — 36415 COLL VENOUS BLD VENIPUNCTURE: CPT

## 2025-04-30 PROCEDURE — 84100 ASSAY OF PHOSPHORUS: CPT

## 2025-04-30 PROCEDURE — G0378 HOSPITAL OBSERVATION PER HR: HCPCS

## 2025-04-30 PROCEDURE — 85379 FIBRIN DEGRADATION QUANT: CPT

## 2025-04-30 PROCEDURE — 83735 ASSAY OF MAGNESIUM: CPT

## 2025-04-30 PROCEDURE — 81001 URINALYSIS AUTO W/SCOPE: CPT

## 2025-04-30 PROCEDURE — 93005 ELECTROCARDIOGRAM TRACING: CPT | Performed by: EMERGENCY MEDICINE

## 2025-04-30 PROCEDURE — 80053 COMPREHEN METABOLIC PANEL: CPT

## 2025-04-30 PROCEDURE — 84484 ASSAY OF TROPONIN QUANT: CPT

## 2025-04-30 PROCEDURE — 83880 ASSAY OF NATRIURETIC PEPTIDE: CPT

## 2025-04-30 PROCEDURE — 0202U NFCT DS 22 TRGT SARS-COV-2: CPT

## 2025-04-30 PROCEDURE — 93005 ELECTROCARDIOGRAM TRACING: CPT

## 2025-04-30 PROCEDURE — 25010000002 ENOXAPARIN PER 10 MG: Performed by: PHYSICIAN ASSISTANT

## 2025-04-30 PROCEDURE — 71045 X-RAY EXAM CHEST 1 VIEW: CPT

## 2025-04-30 PROCEDURE — 96372 THER/PROPH/DIAG INJ SC/IM: CPT

## 2025-04-30 RX ORDER — AMLODIPINE BESYLATE 5 MG/1
10 TABLET ORAL DAILY
Status: DISCONTINUED | OUTPATIENT
Start: 2025-04-30 | End: 2025-05-01 | Stop reason: HOSPADM

## 2025-04-30 RX ORDER — ACETAMINOPHEN 500 MG
1000 TABLET ORAL ONCE
Status: COMPLETED | OUTPATIENT
Start: 2025-04-30 | End: 2025-04-30

## 2025-04-30 RX ORDER — MULTIPLE VITAMINS W/ MINERALS TAB 9MG-400MCG
1 TAB ORAL DAILY
COMMUNITY

## 2025-04-30 RX ORDER — CARVEDILOL 25 MG/1
25 TABLET ORAL 2 TIMES DAILY WITH MEALS
Status: DISCONTINUED | OUTPATIENT
Start: 2025-04-30 | End: 2025-05-01 | Stop reason: HOSPADM

## 2025-04-30 RX ORDER — CLONIDINE HYDROCHLORIDE 0.1 MG/1
0.2 TABLET ORAL 2 TIMES DAILY
Status: DISCONTINUED | OUTPATIENT
Start: 2025-04-30 | End: 2025-05-01 | Stop reason: HOSPADM

## 2025-04-30 RX ORDER — WARFARIN SODIUM 5 MG/1
5 TABLET ORAL DAILY
Status: DISCONTINUED | OUTPATIENT
Start: 2025-04-30 | End: 2025-05-01

## 2025-04-30 RX ORDER — ALLOPURINOL 300 MG/1
300 TABLET ORAL DAILY
Status: DISCONTINUED | OUTPATIENT
Start: 2025-04-30 | End: 2025-05-01 | Stop reason: HOSPADM

## 2025-04-30 RX ORDER — PANTOPRAZOLE SODIUM 40 MG/1
40 TABLET, DELAYED RELEASE ORAL
Status: DISCONTINUED | OUTPATIENT
Start: 2025-05-01 | End: 2025-05-01 | Stop reason: HOSPADM

## 2025-04-30 RX ORDER — ROSUVASTATIN CALCIUM 10 MG/1
20 TABLET, COATED ORAL DAILY
Status: DISCONTINUED | OUTPATIENT
Start: 2025-04-30 | End: 2025-05-01 | Stop reason: HOSPADM

## 2025-04-30 RX ORDER — CHLORTHALIDONE 25 MG/1
25 TABLET ORAL DAILY
Status: DISCONTINUED | OUTPATIENT
Start: 2025-04-30 | End: 2025-05-01 | Stop reason: HOSPADM

## 2025-04-30 RX ORDER — ENOXAPARIN SODIUM 100 MG/ML
1 INJECTION SUBCUTANEOUS EVERY 12 HOURS
Status: DISCONTINUED | OUTPATIENT
Start: 2025-04-30 | End: 2025-05-01 | Stop reason: HOSPADM

## 2025-04-30 RX ADMIN — WARFARIN SODIUM 5 MG: 5 TABLET ORAL at 20:08

## 2025-04-30 RX ADMIN — ENOXAPARIN SODIUM 80 MG: 100 INJECTION SUBCUTANEOUS at 20:08

## 2025-04-30 RX ADMIN — CARVEDILOL 25 MG: 25 TABLET, FILM COATED ORAL at 20:07

## 2025-04-30 RX ADMIN — CLONIDINE HYDROCHLORIDE 0.2 MG: 0.1 TABLET ORAL at 20:08

## 2025-04-30 RX ADMIN — ACETAMINOPHEN 1000 MG: 500 TABLET, FILM COATED ORAL at 21:46

## 2025-04-30 NOTE — PROGRESS NOTES
"Pharmacy dosing service  Anticoagulant  Warfarin     Subjective:    Spencer Brothers is a 54 y.o.male being continued on warfarin for History of DVT/PE.    INR Goal:  2.5-3  Home medication?: warfarin 5 mg PO daily, except warfarin 2.5 mg PO on MWF per the last Anticoag visit note 6/21/2024, patient also confirmed this.   Bridge Therapy Present?:  Yes, Enoxaparin 80 mg SQ Q12H  Interacting Medications Evaluation (New/Present/Discontinued):   Allopurinol  Rosuvastatin  Chlorthalidone  Pantoprazole  Additional Contributing Factors:   Patient very non-compliant with home meds.       Assessment/Plan:    Patient's INR is SUBtherapeutic today at 1.06. After much discussion with patient's warfarin clinic (Dr. To's office), the last INR goal was 2.5-3.5 but they were unsure why and reached out to Dr. To regarding it, still waiting on a clear answer.    Will give a dose of warfarin 5 mg today and continue to monitor.  Daily PT/INR ordered.    Continue to monitor and adjust based on INR.         Date 4/30           INR 1.06           Dose 5 mg             Diet Order   Procedures    Diet: Cardiac; Healthy Heart (2-3 Na+); Fluid Consistency: Thin (IDDSI 0)      Objective:  [Ht: 172.7 cm (68\"); Wt: 79.8 kg (176 lb); BMI: Body mass index is 26.76 kg/m².]    Lab Results   Component Value Date    ALBUMIN 4.8 04/30/2025     Lab Results   Component Value Date    INR 1.06 04/30/2025    INR 2.30 (A) 06/21/2024    INR 2.70 05/24/2024    PROTIME 13.7 04/30/2025    PROTIME 29.1 (H) 04/04/2024    PROTIME 23.7 03/25/2024     Lab Results   Component Value Date    HGB 15.1 04/30/2025    HGB 16.5 04/12/2024    HGB 15.7 04/04/2024     Lab Results   Component Value Date    HCT 43.4 04/30/2025    HCT 49.5 04/12/2024    HCT 45.4 04/04/2024     Toño Gann [facility]  04/30/25 16:17 EDT   "

## 2025-04-30 NOTE — CASE MANAGEMENT/SOCIAL WORK
Discharge Planning Assessment   [facility]     Patient Name: Spencer Brothers  MRN: [id number]  Today's Date: 4/30/2025    Admit Date: 4/30/2025    Plan: Home with spouse   Discharge Needs Assessment       Row Name 04/30/25 160       Living Environment    People in Home spouse    Name(s) of People in Home Vane shelton    Current Living Arrangements apartment    Potentially Unsafe Housing Conditions none    In the past 12 months has the electric, gas, oil, or water company threatened to shut off services in your home? No    Primary Care Provided by self    Provides Primary Care For no one    Family Caregiver if Needed spouse    Family Caregiver Names Vane shelton    Quality of Family Relationships helpful;involved;supportive    Able to Return to Prior Arrangements yes       Resource/Environmental Concerns    Resource/Environmental Concerns none    Transportation Concerns none       Transportation Needs    In the past 12 months, has lack of transportation kept you from medical appointments or from getting medications? no    In the past 12 months, has lack of transportation kept you from meetings, work, or from getting things needed for daily living? No       Food Insecurity    Within the past 12 months, you worried that your food would run out before you got the money to buy more. Never true    Within the past 12 months, the food you bought just didn't last and you didn't have money to get more. Never true       Transition Planning    Patient/Family Anticipates Transition to home with family    Patient/Family Anticipated Services at Transition none    Transportation Anticipated car, drives self;family or friend will provide  Vane shelton       Discharge Needs Assessment    Equipment Currently Used at Home none    Concerns to be Addressed discharge planning    Anticipated Changes Related to Illness none    Equipment Needed After Discharge none              Discharge Plan       Row Name 04/30/25 160       Plan    Plan Home with  spouse    Plan Comments CM met with patient at the bedside to review discharge planning. Patient lives in and apartment with his wife-Vane.  Pt is independent with most IADLs but wife assists with some to share in duties around the home.  Vane-wife will transport patient at d/c. Confirmed PCP, insurance, and pharmacy. Patient accepts [facility]. Patient denies any difficulty affording food, utilities, or medications. Patient is not current with any HHC/PT services. DC Barriers: Cardiac Transthoracic Echo, Telemetry, Cardiology Consult, monitor labs.              Demographic Summary       Row Name 04/30/25 1603       General Information    Admission Type observation    Arrived From home    Referral Source admission list    Reason for Consult discharge planning    Preferred Language English       Contact Information    Permission Granted to Share Info With               Functional Status       Row Name 04/30/25 1603       Functional Status    Usual Activity Tolerance moderate    Current Activity Tolerance moderate       Functional Status, IADL    Medications independent    Meal Preparation assistive person  Vane wife assists    Housekeeping assistive person  Vane wife assists    Laundry completely dependent  Vane wife does laundry    Shopping assistive person  Vane wife assists    If for any reason you need help with day-to-day activities such as bathing, preparing meals, shopping, managing finances, etc., do you get the help you need? I don't need any help     Rosalva Edouard RN    [address] [address]  Phone: [phone]  Fax: [phone]

## 2025-04-30 NOTE — TELEPHONE ENCOUNTER
Patient was originally placed on warfarin for DVT with INR range set 2.5 to 3.5. He is currently in the hospital and pharmacist would like clarification. Do you want range to be 2.5 to 3.5 or 2.0 to 3.0. Thanks

## 2025-04-30 NOTE — ED PROVIDER NOTES
Subjective     Chief Complaint   Patient presents with    Vomiting     History of Present Illness  Chief complaint: Dyspnea with exertion and vomiting    Context: Patient is 54-year-old  male with a history of DVT, CKD, hypertension, hyperlipidemia, and GERD who presents to the emergency department with complaints of dyspnea with exertion for 2 to 3 weeks and episodes of projectile vomiting last night.  Patient states that yesterday he had been sweating profusely through the day and had the episode of vomiting when he got home from work.  Patient states some transient chest pain throughout the last few weeks but states that it is not uncommon for him to have minor episodes of chest pain.  Patient states neck pain has been increasing during that time; unknown if he had any.  Patient states left foot swelling is chronic for him, stating he will be swollen significantly at the end of the day if he is on his feet; denies any right foot swelling.  Patient denies any nausea, diarrhea, or abdominal pain, and denies any shortness of breath at rest.    Patient been previously prescribed warfarin for DVT treatment but states he has not been able to take the medication for approximately 8 months due to an insurance change.    PCP:  CARDIO: Yousuf        Review of Systems   Cardiovascular:  Positive for chest pain.   Gastrointestinal:  Positive for vomiting.       Past Medical History:   Diagnosis Date    Allergic     Arthritis     Chronic kidney disease 2010    DVT (deep venous thrombosis) 02/05/2024    GERD (gastroesophageal reflux disease)     Hyperlipidemia     Hypertension        Allergies   Allergen Reactions    Lisinopril Angioedema       Past Surgical History:   Procedure Laterality Date    CARDIAC CATHETERIZATION N/A 02/05/2024    Procedure: Percutaneous Mechanical Thrombectomy;  Surgeon: Enrique To MD;  Location: [facility] CATH INVASIVE LOCATION;  Service: Cardiovascular;  Laterality: N/A;    CARDIAC  CATHETERIZATION N/A 03/08/2024    Procedure: Percutaneous Mechanical Thrombectomy;  Surgeon: Enrique To MD;  Location: [facility] CATH INVASIVE LOCATION;  Service: Cardiovascular;  Laterality: N/A;    CARDIAC CATHETERIZATION Right 04/04/2024    Procedure: Left Heart Cath with Coronary Angiography;  Surgeon: Enrique To MD;  Location: [facility] CATH INVASIVE LOCATION;  Service: Cardiovascular;  Laterality: Right;    COLONOSCOPY      KNEE SURGERY      NEPHRECTOMY      SHOULDER SURGERY      VEIN SURGERY         Family History   Problem Relation Age of Onset    Heart disease Mother     Heart attack Mother 53    Breast cancer Mother 50    Heart disease Maternal Uncle     Heart attack Maternal Uncle     Diabetes Maternal Uncle        Social History     Socioeconomic History    Marital status:    Tobacco Use    Smoking status: Never     Passive exposure: Never    Smokeless tobacco: Never   Vaping Use    Vaping status: Never Used   Substance and Sexual Activity    Alcohol use: Yes     Comment: Occasional    Drug use: Never    Sexual activity: Defer           Objective   Physical Exam  Vitals and nursing note reviewed.   Constitutional:       General: He is not in acute distress.     Appearance: Normal appearance. He is normal weight. He is not ill-appearing or toxic-appearing.   HENT:      Head: Normocephalic and atraumatic.      Nose: No congestion or rhinorrhea.      Mouth/Throat:      Mouth: Mucous membranes are moist.      Pharynx: Oropharynx is clear.   Eyes:      Extraocular Movements: Extraocular movements intact.      Pupils: Pupils are equal, round, and reactive to light.   Cardiovascular:      Rate and Rhythm: Normal rate and regular rhythm.      Pulses: Normal pulses.      Heart sounds: Normal heart sounds.   Pulmonary:      Effort: Pulmonary effort is normal. No respiratory distress.      Breath sounds: Normal breath sounds.   Abdominal:      General: Abdomen is flat. Bowel sounds are normal. There is no  "distension.      Palpations: Abdomen is soft. There is no mass.      Tenderness: There is no abdominal tenderness.   Musculoskeletal:         General: No swelling or tenderness. Normal range of motion.      Cervical back: Normal range of motion and neck supple. No tenderness.      Right lower leg: No edema.      Left lower leg: No edema.   Skin:     General: Skin is warm.      Capillary Refill: Capillary refill takes less than 2 seconds.      Coloration: Skin is not jaundiced or pale.   Neurological:      General: No focal deficit present.      Mental Status: He is alert. Mental status is at baseline.   Psychiatric:         Mood and Affect: Mood normal.         Thought Content: Thought content normal.         Judgment: Judgment normal.         Procedures           ED Course  /88   Pulse 88   Temp 98.6 °F (37 °C) (Oral)   Resp 12   Ht 172.7 cm (68\")   Wt 79.8 kg (176 lb)   SpO2 96%   BMI 26.76 kg/m²   Labs Reviewed   COMPREHENSIVE METABOLIC PANEL - Abnormal; Notable for the following components:       Result Value    Glucose 104 (*)     BUN 22 (*)     Calcium 10.6 (*)     All other components within normal limits    Narrative:     GFR Categories in Chronic Kidney Disease (CKD)      GFR Category          GFR (mL/min/1.73)    Interpretation  G1                     90 or greater         Normal or high (1)  G2                      60-89                Mild decrease (1)  G3a                   45-59                Mild to moderate decrease  G3b                   30-44                Moderate to severe decrease  G4                    15-29                Severe decrease  G5                    14 or less           Kidney failure          (1)In the absence of evidence of kidney disease, neither GFR category G1 or G2 fulfill the criteria for CKD.    eGFR calculation 2021 CKD-EPI creatinine equation, which does not include race as a factor   URINALYSIS W/ CULTURE IF INDICATED - Abnormal; Notable for the following " components:    Ketones, UA Trace (*)     Protein, UA >=300 mg/dL (3+) (*)     All other components within normal limits    Narrative:     In absence of clinical symptoms, the presence of pyuria, bacteria, and/or nitrites on the urinalysis result does not correlate with infection.   VALPROIC ACID LEVEL, TOTAL - Abnormal; Notable for the following components:    Valproic Acid <2.8 (*)     All other components within normal limits    Narrative:     Therapeutic Ranges for Valproic Acid    Epilepsy:       mcg/ml  Bipolar/Janny  up to 125 mcg/ml     RESPIRATORY PANEL PCR W/ COVID-19 (SARS-COV-2), NP SWAB IN UTM/VTP, 2 HR TAT - Normal    Narrative:     In the setting of a positive respiratory panel with a viral infection PLUS a negative procalcitonin without other underlying concern for bacterial infection, consider observing off antibiotics or discontinuation of antibiotics and continue supportive care. If the respiratory panel is positive for atypical bacterial infection (Bordetella pertussis, Chlamydophila pneumoniae, or Mycoplasma pneumoniae), consider antibiotic de-escalation to target atypical bacterial infection.   PROTIME-INR - Normal   BNP (IN-HOUSE) - Normal    Narrative:     This assay is used as an aid in the diagnosis of individuals suspected of having heart failure. It can be used as an aid in the diagnosis of acute decompensated heart failure (ADHF) in patients presenting with signs and symptoms of ADHF to the emergency department (ED). In addition, NT-proBNP of <300 pg/mL indicates ADHF is not likely.    Age Range Result Interpretation  NT-proBNP Concentration (pg/mL:      <50             Positive            >450                   Gray                 300-450                    Negative             <300    50-75           Positive            >900                  Gray                300-900                  Negative            <300      >75             Positive            >1800                  Stephens                 300-1800                  Negative            <300   TROPONIN - Normal    Narrative:     High Sensitive Troponin T Reference Range:  <14.0 ng/L- Negative Female for AMI  <22.0 ng/L- Negative Male for AMI  >=14 - Abnormal Female indicating possible myocardial injury.  >=22 - Abnormal Male indicating possible myocardial injury.   Clinicians would have to utilize clinical acumen, EKG, Troponin, and serial changes to determine if it is an Acute Myocardial Infarction or myocardial injury due to an underlying chronic condition.        MAGNESIUM - Normal   PHOSPHORUS - Normal   CBC WITH AUTO DIFFERENTIAL - Normal   HIGH SENSITIVITIY TROPONIN T 1HR - Normal    Narrative:     High Sensitive Troponin T Reference Range:  <14.0 ng/L- Negative Female for AMI  <22.0 ng/L- Negative Male for AMI  >=14 - Abnormal Female indicating possible myocardial injury.  >=22 - Abnormal Male indicating possible myocardial injury.   Clinicians would have to utilize clinical acumen, EKG, Troponin, and serial changes to determine if it is an Acute Myocardial Infarction or myocardial injury due to an underlying chronic condition.        D-DIMER, QUANTITATIVE - Normal    Narrative:     According to the assay 's published package insert, a normal (<0.50 MCGFEU/mL) D-dimer result in conjunction with a non-high clinical probability assessment, excludes deep vein thrombosis (DVT) and pulmonary embolism (PE) with high sensitivity.    D-dimer values increase with age and this can make VTE exclusion of an older population difficult. To address this, the [employer], based on best available evidence and recent guidelines, recommends that clinicians use age-adjusted D-dimer thresholds in patients greater than 50 years of age with: a) a low probability of PE who do not meet all Pulmonary Embolism Rule Out Criteria, or b) in those with intermediate probability of PE.   The formula for an age-adjusted D-dimer cut-off  "is \"age/100\".  For example, a 60 year old patient would have an age-adjusted cut-off of 0.60 MCGFEU/mL and an 80 year old 0.80 MCGFEU/mL.   URINALYSIS, MICROSCOPIC ONLY   CBC AND DIFFERENTIAL    Narrative:     The following orders were created for panel order CBC & Differential.  Procedure                               Abnormality         Status                     ---------                               -----------         ------                     CBC Auto Differential[[id number]]        Normal              Final result                 Please view results for these tests on the individual orders.     Medications - No data to display  XR Chest 1 View  Result Date: 4/30/2025  Impression: No acute process. Electronically Signed: Noah Claros MD  4/30/2025 11:58 AM EDT  Workstation ID: [id number]                                                         Medical Decision Making  /83   Pulse 86   Temp 98.6 °F (37 °C) (Oral)   Resp 12   Ht 172.7 cm (68\")   Wt 79.8 kg (176 lb)   SpO2 96%   BMI 26.76 kg/m²      Chart review: Patient's echo, stress test, and cardiac cath reviewed; results of most recent included at the end of this MDM.    Patient is 54-year-old male who presents emergency department with complaints of an episode of projectile vomiting with dyspnea on exertion and chest pain.  See full HPI above.    Primary assessment and initial physical exam noted above.    Patient is placed into ED bed and gown for assessment.  IV access is maintained for labs and medication administration if indicated.  Primary differentials for patient include heart failure, pneumonia, SHAHRAM, electrolyte derangement.    Comorbidities:  has a past medical history of Allergic, Arthritis, Chronic kidney disease (2010), DVT (deep venous thrombosis) (02/05/2024), GERD (gastroesophageal reflux disease), Hyperlipidemia, and Hypertension.    Discussion with provider: Dr. David Marquis    Radiology interpretation: Imaging interpreted by " radiologist and reviewed by ED attending and myself.  XR Chest 1 View  Result Date: 4/30/2025  Impression: No acute process. Electronically Signed: Noah Claros MD  4/30/2025 11:58 AM EDT  Workstation ID: [id number]    Lab interpretation:  Labs consistent with dehydration on UA.  Other labs unremarkable with troponin and BNP within normal limits, no leukocytosis on CBC, and is no electrolyte derangement or kidney injury on CMP.    EKG interpreted by ED attending physician and myself as sinus rhythm with evidence for old inferior and anterior infarcts; rate 91, , QTc 449.  EKG compared to previous from 2/16/2024.  EKG at that time was sinus rhythm with LVH and evidence for old anterior infarct; rate 73, , QTc 453.    Medications - No data to display    Patient is informed of all results and repeat physical exam performed.  Physical exam is unchanged from previous; patient remains alert and oriented, nontoxic in appearance GCS 15.  Plan of care discussed with patient to place him into observation status for and echo and cardiology consult tomorrow morning.  Patient verbalizes understanding of and is in support of this plan of care.  Full report of patient's ED course given to observation unit provider prior to patient leaving the ED.    ECHO 2/6/2024  ·  Left ventricular systolic function is normal. Calculated left ventricular EF = 56% Left ventricular ejection fraction appears to be 56 - 60%.  ·  Left ventricular diastolic function is consistent with (grade I) impaired relaxation.  GLS -14.8%.  ·  Estimated right ventricular systolic pressure from tricuspid regurgitation is normal (<35 mmHg).  ·  No significant valvular abnormalities noted.  Left Ventricle Left ventricular systolic function is normal. Calculated left ventricular EF = 56% Left ventricular ejection fraction appears to be 56 -   60%.   Global longitudinal LV strain (GLS) = -14.8%. Normal left ventricular cavity size and wall thickness noted.  All left ventricular wall segments contract normally. Left ventricular diastolic function is consistent with (grade I) impaired relaxation.  Right Ventricle Normal right ventricular cavity size, wall thickness, systolic function and septal motion noted.  Left Atrium Normal left atrial size and volume noted.  Right Atrium Normal right atrial cavity size noted.  Aortic Valve The aortic valve is grossly normal in structure. Trace aortic valve regurgitation is present. No aortic valve stenosis is present.  Mitral Valve The mitral valve is grossly normal in structure. Trace to mild mitral valve regurgitation is present. No significant mitral valve stenosis is present.  Tricuspid Valve The tricuspid valve is grossly normal in structure. Estimated right ventricular systolic pressure from tricuspid regurgitation is normal (<35 mmHg). No evidence of pulmonary hypertension is present. No tricuspid valve stenosis is present.  Pulmonic Valve The pulmonic valve is not well visualized. The pulmonic valve is grossly normal in structure. There is trace pulmonic valve regurgitation present. There is no pulmonic valve stenosis present.  Greater Vessels No dilation of the aortic root is present. No dilation of the sinuses of Valsalva is present.  Pericardium There is no evidence of pericardial effusion.    Stress Test 2/23/2024  ·  Findings consistent with a normal ECG stress test.  ·  Left ventricular ejection fraction is normal (Calculated EF = 66%).  ·  Myocardial perfusion imaging indicates a moderate-sized, severe area of ischemia located in the inferior wall.  ·  Impressions are consistent with an intermediate risk study.    Cardiac Cath 4/4/2024  Coronary Angiogram.   1. Left main. Left main is a large-caliber vessel which gives rise to the Left Anterior Descending and the Left circumflex.  Left main is angiographically free from any significant disease   2. Left Anterior Descending Artery. LAD is a large vessel which gives  rise to several septal perforators and several diagonal branches. It is angiographically free from any significant disease   3. Left Circumflex. The LCx is a medium caliber which gives rise to marginals.  Left circumflex artery is angiographically free from any significant disease   4. Right Coronary Artery. The RCA is a dominant vessel which gives rise to several small caliber branches including PDA and PLV.  Right coronary artery is angiographically free from any significant disease.   IMPRESSIONS.  1. Non-obstructive CAD  2.  Normal LVEDP   RECOMMENDATIONS.  1. Continue aggressive risk factor modification for primary prevention.  2. Exercise and lifestyle modifications recommended.    3.  Definitely needs better lipid control/treatment for hypertriglyceridemia    Appropriate PPE worn during exam.    I discussed findings with patient who voiced understanding of discharge instructions, signs and symptoms requiring return to ED; discharged improved and in stable condition with follow up for re-evaluation.  This document is intended for medical expert use only. Reading of this document by patients and/or patient's family without participating medical staff guidance may result in misinterpretation and unintended morbidity.  Any interpretation of such data is the responsibility of the patient and/or family member responsible for the patient in concert with their primary or specialist providers, not to be left for sources of online searches such as [employer], [employer] or similar queries. Relying on these approaches to knowledge may result in misinterpretation, misguided goals of care and even death should patients or family members try recommendations outside of the realm of professional medical care in a supervised inpatient environment.       Problems Addressed:  Chest pain, unspecified type: complicated acute illness or injury  Dyspnea on exertion: complicated acute illness or injury  Neck pain: complicated acute illness  or injury  Projectile vomiting without nausea: complicated acute illness or injury    Amount and/or Complexity of Data Reviewed  Labs: ordered.  Radiology: ordered.    Risk  Decision regarding hospitalization.        Final diagnoses:   Dyspnea on exertion   Projectile vomiting without nausea   Neck pain   Chest pain, unspecified type       ED Disposition  ED Disposition       ED Disposition   Decision to Admit    Condition   --    Comment   --               No follow-up provider specified.       Medication List        ASK your doctor about these medications      warfarin 5 MG tablet  Commonly known as: COUMADIN  Ask about: Which instructions should I use?                 Edgard Li PA-C  04/30/25 1510

## 2025-04-30 NOTE — ED NOTES
Nursing report ED to floor  Spencer Brothers  54 y.o.  male    HPI:   Chief Complaint   Patient presents with    Vomiting       Admitting doctor:   David Marquis MD    Admitting diagnosis:   The primary encounter diagnosis was Dyspnea on exertion. Diagnoses of Projectile vomiting without nausea, Neck pain, and Chest pain, unspecified type were also pertinent to this visit.    Code status:   Current Code Status       Date Active Code Status Order ID Comments User Context       Prior            Allergies:   Lisinopril    Isolation:  No active isolations     Fall Risk:  Fall Risk Assessment was completed, and patient is at low risk for falls.   Predictive Model Details         2 (Low) Factor Value    Calculated 4/30/2025 15:08 Age 54    Risk of Fall Model Imaging order in this encounter Present     Magnesium 2 mg/dL     Thor Scale not on file     Respiratory Rate 12     Chloride 99 mmol/L     Clinically Relevant Sex Not Female     Cardiac Assessment X     Diastolic BP 88     ALT 37 U/L     Total Bilirubin 0.5 mg/dL     Creatinine 1.18 mg/dL     Duration of Current Encounter 0.184 days     Calcium 10.6 mg/dL     Potassium 3.5 mmol/L     Albumin 4.8 g/dL         Weight:       04/30/25  1103   Weight: 79.8 kg (176 lb)       Intake and Output  No intake or output data in the 24 hours ending 04/30/25 1510    Diet:        Most recent vitals:   Vitals:    04/30/25 1344 04/30/25 1416 04/30/25 1446 04/30/25 1501   BP: 113/83 112/80 130/89 119/88   Pulse: 86 86 85 88   Resp:       Temp:       TempSrc:       SpO2: 96% 97% 98% 96%   Weight:       Height:           Active LDAs/IV Access:   Lines, Drains & Airways       Active LDAs       Name Placement date Placement time Site Days    Peripheral IV 04/30/25 1507 20 G Anterior;Right Forearm 04/30/25  1507  Forearm  less than 1                    Skin Condition:   Skin Assessments (last day)       None             Labs (abnormal labs have a star):   Labs Reviewed   COMPREHENSIVE  METABOLIC PANEL - Abnormal; Notable for the following components:       Result Value    Glucose 104 (*)     BUN 22 (*)     Calcium 10.6 (*)     All other components within normal limits    Narrative:     GFR Categories in Chronic Kidney Disease (CKD)      GFR Category          GFR (mL/min/1.73)    Interpretation  G1                     90 or greater         Normal or high (1)  G2                      60-89                Mild decrease (1)  G3a                   45-59                Mild to moderate decrease  G3b                   30-44                Moderate to severe decrease  G4                    15-29                Severe decrease  G5                    14 or less           Kidney failure          (1)In the absence of evidence of kidney disease, neither GFR category G1 or G2 fulfill the criteria for CKD.    eGFR calculation 2021 CKD-EPI creatinine equation, which does not include race as a factor   URINALYSIS W/ CULTURE IF INDICATED - Abnormal; Notable for the following components:    Ketones, UA Trace (*)     Protein, UA >=300 mg/dL (3+) (*)     All other components within normal limits    Narrative:     In absence of clinical symptoms, the presence of pyuria, bacteria, and/or nitrites on the urinalysis result does not correlate with infection.   VALPROIC ACID LEVEL, TOTAL - Abnormal; Notable for the following components:    Valproic Acid <2.8 (*)     All other components within normal limits    Narrative:     Therapeutic Ranges for Valproic Acid    Epilepsy:       mcg/ml  Bipolar/Janny  up to 125 mcg/ml     RESPIRATORY PANEL PCR W/ COVID-19 (SARS-COV-2), NP SWAB IN UTM/VTP, 2 HR TAT - Normal    Narrative:     In the setting of a positive respiratory panel with a viral infection PLUS a negative procalcitonin without other underlying concern for bacterial infection, consider observing off antibiotics or discontinuation of antibiotics and continue supportive care. If the respiratory panel is positive for  atypical bacterial infection (Bordetella pertussis, Chlamydophila pneumoniae, or Mycoplasma pneumoniae), consider antibiotic de-escalation to target atypical bacterial infection.   PROTIME-INR - Normal   BNP (IN-HOUSE) - Normal    Narrative:     This assay is used as an aid in the diagnosis of individuals suspected of having heart failure. It can be used as an aid in the diagnosis of acute decompensated heart failure (ADHF) in patients presenting with signs and symptoms of ADHF to the emergency department (ED). In addition, NT-proBNP of <300 pg/mL indicates ADHF is not likely.    Age Range Result Interpretation  NT-proBNP Concentration (pg/mL:      <50             Positive            >450                   Gray                 300-450                    Negative             <300    50-75           Positive            >900                  Gray                300-900                  Negative            <300      >75             Positive            >1800                  Gray                300-1800                  Negative            <300   TROPONIN - Normal    Narrative:     High Sensitive Troponin T Reference Range:  <14.0 ng/L- Negative Female for AMI  <22.0 ng/L- Negative Male for AMI  >=14 - Abnormal Female indicating possible myocardial injury.  >=22 - Abnormal Male indicating possible myocardial injury.   Clinicians would have to utilize clinical acumen, EKG, Troponin, and serial changes to determine if it is an Acute Myocardial Infarction or myocardial injury due to an underlying chronic condition.        MAGNESIUM - Normal   PHOSPHORUS - Normal   CBC WITH AUTO DIFFERENTIAL - Normal   HIGH SENSITIVITIY TROPONIN T 1HR - Normal    Narrative:     High Sensitive Troponin T Reference Range:  <14.0 ng/L- Negative Female for AMI  <22.0 ng/L- Negative Male for AMI  >=14 - Abnormal Female indicating possible myocardial injury.  >=22 - Abnormal Male indicating possible myocardial injury.   Clinicians would have to  "utilize clinical acumen, EKG, Troponin, and serial changes to determine if it is an Acute Myocardial Infarction or myocardial injury due to an underlying chronic condition.        D-DIMER, QUANTITATIVE - Normal    Narrative:     According to the assay 's published package insert, a normal (<0.50 MCGFEU/mL) D-dimer result in conjunction with a non-high clinical probability assessment, excludes deep vein thrombosis (DVT) and pulmonary embolism (PE) with high sensitivity.    D-dimer values increase with age and this can make VTE exclusion of an older population difficult. To address this, the [employer], based on best available evidence and recent guidelines, recommends that clinicians use age-adjusted D-dimer thresholds in patients greater than 50 years of age with: a) a low probability of PE who do not meet all Pulmonary Embolism Rule Out Criteria, or b) in those with intermediate probability of PE.   The formula for an age-adjusted D-dimer cut-off is \"age/100\".  For example, a 60 year old patient would have an age-adjusted cut-off of 0.60 MCGFEU/mL and an 80 year old 0.80 MCGFEU/mL.   URINALYSIS, MICROSCOPIC ONLY   CBC AND DIFFERENTIAL    Narrative:     The following orders were created for panel order CBC & Differential.  Procedure                               Abnormality         Status                     ---------                               -----------         ------                     CBC Auto Differential[[id number]]        Normal              Final result                 Please view results for these tests on the individual orders.       LOC: Person, Place, Time, and Situation    Telemetry:  Observation Unit    Cardiac Monitoring Ordered: yes    EKG:   ECG 12 Lead Chest Pain   Preliminary Result   HEART RATE=91  bpm   RR Evodilpv=520  ms   NJ Dcntxyzn=400  ms   P Horizontal Axis=-1  deg   P Front Axis=29  deg   QRSD Gswqydxn=257  ms   QT Icjncekj=737  ms   IQaE=859  ms   QRS " Axis=9  deg   T Wave Axis=31  deg   - ABNORMAL ECG -   Sinus rhythm   Inferior infarct, old   Anterior infarct, old   Date and Time of Study:2025-04-30 11:01:20          Medications Given in the ED:   Medications - No data to display    Imaging results:  XR Chest 1 View  Result Date: 4/30/2025  Impression: No acute process. Electronically Signed: Noah Claros MD  4/30/2025 11:58 AM EDT  Workstation ID: [id number]      Social issues:   Social History     Socioeconomic History    Marital status:    Tobacco Use    Smoking status: Never     Passive exposure: Never    Smokeless tobacco: Never   Vaping Use    Vaping status: Never Used   Substance and Sexual Activity    Alcohol use: Yes     Comment: Occasional    Drug use: Never    Sexual activity: Defer       NIH Stroke Scale:  Interval: (not recorded)  1a. Level of Consciousness: (not recorded)  1b. LOC Questions: (not recorded)  1c. LOC Commands: (not recorded)  2. Best Gaze: (not recorded)  3. Visual: (not recorded)  4. Facial Palsy: (not recorded)  5a. Motor Arm, Left: (not recorded)  5b. Motor Arm, Right: (not recorded)  6a. Motor Leg, Left: (not recorded)  6b. Motor Leg, Right: (not recorded)  7. Limb Ataxia: (not recorded)  8. Sensory: (not recorded)  9. Best Language: (not recorded)  10. Dysarthria: (not recorded)  11. Extinction and Inattention (formerly Neglect): (not recorded)    Total (NIH Stroke Scale): (not recorded)     Additional notable assessment information:Cardiology consult acknowledged but has not been called yet, nurse Blanco informed verbally during report     Nursing report ED to floor:  To Nurse Blanco RN 2OBS    Devonte Anna RN   04/30/25 15:10 EDT

## 2025-04-30 NOTE — LETTER
May 1, 2025     Patient: Spencer Brothers   YOB: 1970   Date of Visit: 4/30/2025       To Whom It May Concern:    It is my medical opinion that Spencer Brothers may return to work on 5/2/25 .           Sincerely,            REE Costa RN

## 2025-05-01 ENCOUNTER — APPOINTMENT (OUTPATIENT)
Dept: CARDIOLOGY | Facility: HOSPITAL | Age: 55
End: 2025-05-01
Payer: COMMERCIAL

## 2025-05-01 VITALS
SYSTOLIC BLOOD PRESSURE: 121 MMHG | HEIGHT: 68 IN | RESPIRATION RATE: 13 BRPM | BODY MASS INDEX: 26.73 KG/M2 | OXYGEN SATURATION: 98 % | WEIGHT: 176.37 LBS | TEMPERATURE: 98.3 F | DIASTOLIC BLOOD PRESSURE: 83 MMHG | HEART RATE: 81 BPM

## 2025-05-01 LAB
ANION GAP SERPL CALCULATED.3IONS-SCNC: 14.7 MMOL/L (ref 5–15)
AORTIC DIMENSIONLESS INDEX: 0.86 (DI)
AV MEAN PRESS GRAD SYS DOP V1V2: 2 MMHG
AV VMAX SYS DOP: 93.4 CM/SEC
BASOPHILS # BLD AUTO: 0.05 10*3/MM3 (ref 0–0.2)
BASOPHILS NFR BLD AUTO: 0.5 % (ref 0–1.5)
BH CV ECHO LEFT VENTRICLE GLOBAL LONGITUDINAL STRAIN: -14.6 %
BH CV ECHO MEAS - ACS: 2.3 CM
BH CV ECHO MEAS - AO MAX PG: 3.5 MMHG
BH CV ECHO MEAS - AO V2 VTI: 17.6 CM
BH CV ECHO MEAS - AVA(I,D): 2.48 CM2
BH CV ECHO MEAS - EDV(CUBED): 97.3 ML
BH CV ECHO MEAS - EDV(MOD-SP4): 88.3 ML
BH CV ECHO MEAS - EF(MOD-SP4): 60 %
BH CV ECHO MEAS - ESV(CUBED): 32.8 ML
BH CV ECHO MEAS - ESV(MOD-SP4): 35.3 ML
BH CV ECHO MEAS - FS: 30.4 %
BH CV ECHO MEAS - IVS/LVPW: 1 CM
BH CV ECHO MEAS - IVSD: 1 CM
BH CV ECHO MEAS - LA DIMENSION: 3.5 CM
BH CV ECHO MEAS - LAT PEAK E' VEL: 9.4 CM/SEC
BH CV ECHO MEAS - LV DIASTOLIC VOL/BSA (35-75): 45.6 CM2
BH CV ECHO MEAS - LV MASS(C)D: 158.8 GRAMS
BH CV ECHO MEAS - LV MAX PG: 2.6 MMHG
BH CV ECHO MEAS - LV MEAN PG: 1 MMHG
BH CV ECHO MEAS - LV SYSTOLIC VOL/BSA (12-30): 18.2 CM2
BH CV ECHO MEAS - LV V1 MAX: 80.2 CM/SEC
BH CV ECHO MEAS - LV V1 VTI: 13.9 CM
BH CV ECHO MEAS - LVIDD: 4.6 CM
BH CV ECHO MEAS - LVIDS: 3.2 CM
BH CV ECHO MEAS - LVOT AREA: 3.1 CM2
BH CV ECHO MEAS - LVOT DIAM: 2 CM
BH CV ECHO MEAS - LVPWD: 1 CM
BH CV ECHO MEAS - MED PEAK E' VEL: 6.4 CM/SEC
BH CV ECHO MEAS - MV A DUR: 0.11 SEC
BH CV ECHO MEAS - MV A MAX VEL: 79.1 CM/SEC
BH CV ECHO MEAS - MV DEC SLOPE: 377 CM/SEC2
BH CV ECHO MEAS - MV DEC TIME: 0.19 SEC
BH CV ECHO MEAS - MV E MAX VEL: 51.9 CM/SEC
BH CV ECHO MEAS - MV E/A: 0.66
BH CV ECHO MEAS - MV MAX PG: 2.16 MMHG
BH CV ECHO MEAS - MV MEAN PG: 1 MMHG
BH CV ECHO MEAS - MV P1/2T: 37.9 MSEC
BH CV ECHO MEAS - MV V2 VTI: 13.2 CM
BH CV ECHO MEAS - MVA(P1/2T): 5.8 CM2
BH CV ECHO MEAS - MVA(VTI): 3.3 CM2
BH CV ECHO MEAS - PA ACC TIME: 0.12 SEC
BH CV ECHO MEAS - PA V2 MAX: 81.3 CM/SEC
BH CV ECHO MEAS - PI END-D VEL: 95.5 CM/SEC
BH CV ECHO MEAS - RV MAX PG: 1.59 MMHG
BH CV ECHO MEAS - RV V1 MAX: 63.1 CM/SEC
BH CV ECHO MEAS - RV V1 VTI: 15.6 CM
BH CV ECHO MEAS - SV(LVOT): 43.7 ML
BH CV ECHO MEAS - SV(MOD-SP4): 53 ML
BH CV ECHO MEAS - SVI(LVOT): 22.6 ML/M2
BH CV ECHO MEAS - SVI(MOD-SP4): 27.4 ML/M2
BH CV ECHO MEAS - TAPSE (>1.6): 1.6 CM
BH CV ECHO MEAS - TR MAX PG: 8.5 MMHG
BH CV ECHO MEAS - TR MAX VEL: 146 CM/SEC
BH CV ECHO MEASUREMENTS AVERAGE E/E' RATIO: 6.57
BH CV XLRA - TDI S': 9.6 CM/SEC
BUN SERPL-MCNC: 28 MG/DL (ref 6–20)
BUN/CREAT SERPL: 21.9 (ref 7–25)
CALCIUM SPEC-SCNC: 9.9 MG/DL (ref 8.6–10.5)
CHLORIDE SERPL-SCNC: 100 MMOL/L (ref 98–107)
CHOLEST SERPL-MCNC: 357 MG/DL (ref 0–200)
CO2 SERPL-SCNC: 22.3 MMOL/L (ref 22–29)
CREAT SERPL-MCNC: 1.28 MG/DL (ref 0.76–1.27)
DEPRECATED RDW RBC AUTO: 42.9 FL (ref 37–54)
EGFRCR SERPLBLD CKD-EPI 2021: 66.9 ML/MIN/1.73
EOSINOPHIL # BLD AUTO: 0.14 10*3/MM3 (ref 0–0.4)
EOSINOPHIL NFR BLD AUTO: 1.5 % (ref 0.3–6.2)
ERYTHROCYTE [DISTWIDTH] IN BLOOD BY AUTOMATED COUNT: 13.2 % (ref 12.3–15.4)
GLUCOSE SERPL-MCNC: 104 MG/DL (ref 65–99)
HCT VFR BLD AUTO: 41.7 % (ref 37.5–51)
HDLC SERPL-MCNC: 39 MG/DL (ref 40–60)
HGB BLD-MCNC: 14 G/DL (ref 13–17.7)
IMM GRANULOCYTES # BLD AUTO: 0.03 10*3/MM3 (ref 0–0.05)
IMM GRANULOCYTES NFR BLD AUTO: 0.3 % (ref 0–0.5)
INR PPP: 1.15 (ref 2–3)
LDLC SERPL CALC-MCNC: 222 MG/DL (ref 0–100)
LDLC/HDLC SERPL: 5.9 {RATIO}
LYMPHOCYTES # BLD AUTO: 1.68 10*3/MM3 (ref 0.7–3.1)
LYMPHOCYTES NFR BLD AUTO: 18.1 % (ref 19.6–45.3)
MCH RBC QN AUTO: 30 PG (ref 26.6–33)
MCHC RBC AUTO-ENTMCNC: 33.6 G/DL (ref 31.5–35.7)
MCV RBC AUTO: 89.5 FL (ref 79–97)
MONOCYTES # BLD AUTO: 0.83 10*3/MM3 (ref 0.1–0.9)
MONOCYTES NFR BLD AUTO: 9 % (ref 5–12)
NEUTROPHILS NFR BLD AUTO: 6.54 10*3/MM3 (ref 1.7–7)
NEUTROPHILS NFR BLD AUTO: 70.6 % (ref 42.7–76)
NRBC BLD AUTO-RTO: 0 /100 WBC (ref 0–0.2)
PLATELET # BLD AUTO: 228 10*3/MM3 (ref 140–450)
PMV BLD AUTO: 10.2 FL (ref 6–12)
POTASSIUM SERPL-SCNC: 3.5 MMOL/L (ref 3.5–5.2)
PROTHROMBIN TIME: 14.7 SECONDS (ref 19.4–28.5)
QT INTERVAL: 365 MS
QTC INTERVAL: 449 MS
RBC # BLD AUTO: 4.66 10*6/MM3 (ref 4.14–5.8)
SINUS: 3.9 CM
SODIUM SERPL-SCNC: 137 MMOL/L (ref 136–145)
STJ: 3.8 CM
TRIGL SERPL-MCNC: 440 MG/DL (ref 0–150)
VLDLC SERPL-MCNC: 96 MG/DL (ref 5–40)
WBC NRBC COR # BLD AUTO: 9.27 10*3/MM3 (ref 3.4–10.8)

## 2025-05-01 PROCEDURE — 93356 MYOCRD STRAIN IMG SPCKL TRCK: CPT

## 2025-05-01 PROCEDURE — 85025 COMPLETE CBC W/AUTO DIFF WBC: CPT | Performed by: PHYSICIAN ASSISTANT

## 2025-05-01 PROCEDURE — 80048 BASIC METABOLIC PNL TOTAL CA: CPT | Performed by: PHYSICIAN ASSISTANT

## 2025-05-01 PROCEDURE — G0378 HOSPITAL OBSERVATION PER HR: HCPCS

## 2025-05-01 PROCEDURE — 63710000001 ONDANSETRON ODT 4 MG TABLET DISPERSIBLE: Performed by: PHYSICIAN ASSISTANT

## 2025-05-01 PROCEDURE — 80061 LIPID PANEL: CPT | Performed by: INTERNAL MEDICINE

## 2025-05-01 PROCEDURE — 25010000002 ENOXAPARIN PER 10 MG: Performed by: PHYSICIAN ASSISTANT

## 2025-05-01 PROCEDURE — 96372 THER/PROPH/DIAG INJ SC/IM: CPT

## 2025-05-01 PROCEDURE — 93356 MYOCRD STRAIN IMG SPCKL TRCK: CPT | Performed by: STUDENT IN AN ORGANIZED HEALTH CARE EDUCATION/TRAINING PROGRAM

## 2025-05-01 PROCEDURE — 85610 PROTHROMBIN TIME: CPT | Performed by: EMERGENCY MEDICINE

## 2025-05-01 PROCEDURE — 99214 OFFICE O/P EST MOD 30 MIN: CPT | Performed by: INTERNAL MEDICINE

## 2025-05-01 PROCEDURE — 93306 TTE W/DOPPLER COMPLETE: CPT

## 2025-05-01 PROCEDURE — 93306 TTE W/DOPPLER COMPLETE: CPT | Performed by: STUDENT IN AN ORGANIZED HEALTH CARE EDUCATION/TRAINING PROGRAM

## 2025-05-01 RX ORDER — ONDANSETRON 4 MG/1
4 TABLET, ORALLY DISINTEGRATING ORAL EVERY 6 HOURS PRN
Status: DISCONTINUED | OUTPATIENT
Start: 2025-05-01 | End: 2025-05-01 | Stop reason: HOSPADM

## 2025-05-01 RX ORDER — WARFARIN SODIUM 7.5 MG/1
7.5 TABLET ORAL DAILY
Status: DISCONTINUED | OUTPATIENT
Start: 2025-05-01 | End: 2025-05-01 | Stop reason: HOSPADM

## 2025-05-01 RX ORDER — WARFARIN SODIUM 7.5 MG/1
TABLET ORAL
Qty: 20 TABLET | Refills: 0 | Status: SHIPPED | OUTPATIENT
Start: 2025-05-01

## 2025-05-01 RX ADMIN — CLONIDINE HYDROCHLORIDE 0.2 MG: 0.1 TABLET ORAL at 08:18

## 2025-05-01 RX ADMIN — ROSUVASTATIN CALCIUM 20 MG: 10 TABLET, FILM COATED ORAL at 08:19

## 2025-05-01 RX ADMIN — PANTOPRAZOLE SODIUM 40 MG: 40 TABLET, DELAYED RELEASE ORAL at 05:29

## 2025-05-01 RX ADMIN — AMLODIPINE BESYLATE 10 MG: 5 TABLET ORAL at 08:19

## 2025-05-01 RX ADMIN — ONDANSETRON 4 MG: 4 TABLET, ORALLY DISINTEGRATING ORAL at 09:15

## 2025-05-01 RX ADMIN — ENOXAPARIN SODIUM 80 MG: 100 INJECTION SUBCUTANEOUS at 05:29

## 2025-05-01 RX ADMIN — CARVEDILOL 25 MG: 25 TABLET, FILM COATED ORAL at 08:19

## 2025-05-01 RX ADMIN — ALLOPURINOL 300 MG: 300 TABLET ORAL at 08:19

## 2025-05-01 RX ADMIN — CHLORTHALIDONE 25 MG: 25 TABLET ORAL at 08:19

## 2025-05-01 NOTE — CONSULTS
Referring Provider: David Marquis MD    Reason for Consultation: Shortness of breath      Patient Care Team:  Kaleigh Strickland APRN as PCP - General (Nurse Practitioner)  Mery Penaloza APRN as Nurse Practitioner (Cardiology)  Brayan Ramírez MD as Consulting Physician (Hematology and Oncology)  Enrique To MD as Cardiologist (Cardiology)      SUBJECTIVE     Chief Complaint: Vomiting, dyspnea    History of present illness:  Spencer Brothers is a 54 y.o. male with hypertension, hyperlipidemia, overweight, DVT requiring mechanical thrombectomy on 2/5/2024.   Previous presentation with uncontrolled hypertension, chest pain requiring cardiac catheterization which did not show any evidence of obstructive coronary disease.  He now presents with excessive sweating, episode of vomiting and chest pain for several weeks.  He has been on warfarin but his INR is only 1.    Home cardiac medications included amlodipine, Coreg, clonidine, hydralazine and hydrochlorothiazide.    Review of systems:    Constitutional: No weakness, fatigue, fever, rigors, chills   Eyes: No vision changes, eye pain   ENT/oropharynx: No difficulty swallowing, sore throat, epistaxis, changes in hearing   Cardiovascular: + chest pain, chest tightness, palpitations, paroxysmal nocturnal dyspnea, orthopnea, diaphoresis, dizziness / syncopal episode   Respiratory: + shortness of breath, dyspnea on exertion, cough, wheezing, hemoptysis   Gastrointestinal: No abdominal pain, nausea, vomiting, diarrhea, bloody stools   Genitourinary: No hematuria, dysuria   Neurological: No headache, tremors, numbness, one-sided weakness    Musculoskeletal: No cramps, myalgias, joint pain, joint swelling   Integument: No rash, edema        Personal History:      Past Medical History:   Diagnosis Date    Allergic     Arthritis     Chronic kidney disease 2010    DVT (deep venous thrombosis) 02/05/2024    GERD (gastroesophageal reflux disease)     Hyperlipidemia      Hypertension        Past Surgical History:   Procedure Laterality Date    CARDIAC CATHETERIZATION N/A 02/05/2024    Procedure: Percutaneous Mechanical Thrombectomy;  Surgeon: Enrique To MD;  Location: [facility] CATH INVASIVE LOCATION;  Service: Cardiovascular;  Laterality: N/A;    CARDIAC CATHETERIZATION N/A 03/08/2024    Procedure: Percutaneous Mechanical Thrombectomy;  Surgeon: Enrique To MD;  Location: [facility] CATH INVASIVE LOCATION;  Service: Cardiovascular;  Laterality: N/A;    CARDIAC CATHETERIZATION Right 04/04/2024    Procedure: Left Heart Cath with Coronary Angiography;  Surgeon: Enrique To MD;  Location: [facility] CATH INVASIVE LOCATION;  Service: Cardiovascular;  Laterality: Right;    COLONOSCOPY      KNEE SURGERY      NEPHRECTOMY      SHOULDER SURGERY      VEIN SURGERY         Family History   Problem Relation Age of Onset    Heart disease Mother     Heart attack Mother 53    Breast cancer Mother 50    Heart disease Maternal Uncle     Heart attack Maternal Uncle     Diabetes Maternal Uncle        Social History     Tobacco Use    Smoking status: Never     Passive exposure: Never    Smokeless tobacco: Never   Vaping Use    Vaping status: Never Used   Substance Use Topics    Alcohol use: Yes     Comment: Occasional    Drug use: Never        Home meds:  Prior to Admission medications    Medication Sig Start Date End Date Taking? Authorizing Provider   acetaminophen (TYLENOL) 325 MG tablet Take 2 tablets by mouth Every 6 (Six) Hours As Needed for Mild Pain.   Yes ProviderHolly MD   allopurinol (ZYLOPRIM) 300 MG tablet Take 1 tablet by mouth Daily.   Yes ProviderHolly MD   amLODIPine (NORVASC) 10 MG tablet Take 1 tablet by mouth Daily.   Yes ProviderHolly MD   carvedilol (COREG) 25 MG tablet Take 1 tablet by mouth 2 (Two) Times a Day With Meals for 360 days. 2/19/24 4/30/25 Yes Enrique To MD   chlorthalidone (HYGROTON) 25 MG tablet Take 1 tablet by mouth Daily. NEEDS AN  APPOINTMENT FOR FURTHER REFILLS. 9/3/24  Yes Mery Penaloza APRN   Cholecalciferol (VITAMIN D-3 PO) Take 1 capsule by mouth Daily.   Yes Holly Guillen MD   cloNIDine (CATAPRES) 0.2 MG tablet Take 1 tablet by mouth 2 (Two) Times a Day.   Yes Holly Guillen MD   multivitamin with minerals tablet tablet Take 1 tablet by mouth Daily.   Yes Holly Guillen MD   diclofenac (VOLTAREN) 50 MG EC tablet Take 1 tablet by mouth 2 (Two) Times a Day As Needed (Headache).    Holly Guillen MD   divalproex (DEPAKOTE ER) 500 MG 24 hr tablet Take 1 tablet by mouth 2 (Two) Times a Day.    Holly Guillen MD   hydrALAZINE (APRESOLINE) 100 MG tablet Take 1 tablet by mouth 3 (Three) Times a Day.    Holly Guillen MD   icosapent ethyl (VASCEPA) 1 g capsule capsule TAKE 2 CAPSULES BY MOUTH TWICE DAILY WITH MEALS 8/5/24   Enrique To MD   omeprazole (priLOSEC) 20 MG capsule Take 1 capsule by mouth 2 (Two) Times a Day.    Holly Guillen MD   rosuvastatin (CRESTOR) 20 MG tablet Take 1 tablet by mouth Daily. 4/4/24   Enrique To MD   warfarin (COUMADIN) 5 MG tablet Take 1 tablet by mouth Daily. 3/26/24   Holly Guillen MD       Allergies:     Lisinopril    Scheduled Meds:allopurinol, 300 mg, Oral, Daily  amLODIPine, 10 mg, Oral, Daily  carvedilol, 25 mg, Oral, BID With Meals  chlorthalidone, 25 mg, Oral, Daily  cloNIDine, 0.2 mg, Oral, BID  enoxaparin sodium, 1 mg/kg, Subcutaneous, Q12H  pantoprazole, 40 mg, Oral, Q AM  rosuvastatin, 20 mg, Oral, Daily  warfarin, 5 mg, Oral, Daily      Continuous Infusions:Pharmacy to dose warfarin,       PRN Meds:  ondansetron ODT    Pharmacy to dose warfarin      OBJECTIVE    Vital Signs  Vitals:    04/30/25 1902 04/30/25 2007 05/01/25 0321 05/01/25 0742   BP: 115/85 122/88 129/93 110/91   BP Location:  Right arm Right arm Left arm   Patient Position:  Lying Lying Lying   Pulse: 95 101  84   Resp:  16 16 13   Temp:  98.2 °F (36.8 °C) 98.1 °F (36.7  "°C) 98 °F (36.7 °C)   TempSrc:  Oral Oral Oral   SpO2: 97% 92%  98%   Weight:   80 kg (176 lb 5.9 oz)    Height:           Flowsheet Rows      Flowsheet Row First Filed Value   Admission Height 172.7 cm (68\") Documented at 04/30/2025 1103   Admission Weight 79.8 kg (176 lb) Documented at 04/30/2025 1103              Intake/Output Summary (Last 24 hours) at 5/1/2025 0941  Last data filed at 4/30/2025 1911  Gross per 24 hour   Intake 240 ml   Output --   Net 240 ml        Telemetry: Sinus rhythm    Physical Exam:  The patient is alert, oriented and in no distress.  Vital signs as noted above.  Head and neck revealed no carotid bruits or jugular venous distention.  No thyromegaly or lymphadenopathy is present  Lungs clear.  No wheezing.  Breath sounds are normal bilaterally.  Heart: Normal first and second heart sounds. No murmur.  No precordial rub is present.  No gallop is present.  Abdomen: Soft and nontender.  No organomegaly is present.  Extremities with good peripheral pulses without any pedal edema.  Skin: Warm and dry.  Musculoskeletal system is grossly normal.  CNS grossly normal.       Results Review:  I have personally reviewed the results from the time of this admission to 5/1/2025 09:41 EDT and agree with these findings:  []  Laboratory  []  Microbiology  []  Radiology  []  EKG/Telemetry   []  Cardiology/Vascular   []  Pathology  []  Old records  []  Other:    Most notable findings include:     Lab Results (last 24 hours)       Procedure Component Value Units Date/Time    Basic Metabolic Panel [[id number]]  (Abnormal) Collected: 05/01/25 0530    Specimen: Blood Updated: 05/01/25 0706     Glucose 104 mg/dL      BUN 28 mg/dL      Creatinine 1.28 mg/dL      Sodium 137 mmol/L      Potassium 3.5 mmol/L      Chloride 100 mmol/L      CO2 22.3 mmol/L      Calcium 9.9 mg/dL      BUN/Creatinine Ratio 21.9     Anion Gap 14.7 mmol/L      eGFR 66.9 mL/min/1.73     Narrative:      GFR Categories in Chronic Kidney Disease " (CKD)              GFR Category          GFR (mL/min/1.73)    Interpretation  G1                    90 or greater        Normal or high (1)  G2                    60-89                Mild decrease (1)  G3a                   45-59                Mild to moderate decrease  G3b                   30-44                Moderate to severe decrease  G4                    15-29                Severe decrease  G5                    14 or less           Kidney failure    (1)In the absence of evidence of kidney disease, neither GFR category G1 or G2 fulfill the criteria for CKD.    eGFR calculation 2021 CKD-EPI creatinine equation, which does not include race as a factor    CBC & Differential [[id number]]  (Abnormal) Collected: 05/01/25 0530    Specimen: Blood Updated: 05/01/25 0633    Narrative:      The following orders were created for panel order CBC & Differential.  Procedure                               Abnormality         Status                     ---------                               -----------         ------                     CBC Auto Differential[[id number]]        Abnormal            Final result                 Please view results for these tests on the individual orders.    CBC Auto Differential [[id number]]  (Abnormal) Collected: 05/01/25 0530    Specimen: Blood Updated: 05/01/25 0633     WBC 9.27 10*3/mm3      RBC 4.66 10*6/mm3      Hemoglobin 14.0 g/dL      Hematocrit 41.7 %      MCV 89.5 fL      MCH 30.0 pg      MCHC 33.6 g/dL      RDW 13.2 %      RDW-SD 42.9 fl      MPV 10.2 fL      Platelets 228 10*3/mm3      Neutrophil % 70.6 %      Lymphocyte % 18.1 %      Monocyte % 9.0 %      Eosinophil % 1.5 %      Basophil % 0.5 %      Immature Grans % 0.3 %      Neutrophils, Absolute 6.54 10*3/mm3      Lymphocytes, Absolute 1.68 10*3/mm3      Monocytes, Absolute 0.83 10*3/mm3      Eosinophils, Absolute 0.14 10*3/mm3      Basophils, Absolute 0.05 10*3/mm3      Immature Grans, Absolute 0.03 10*3/mm3      nRBC 0.0  "/100 WBC     D-dimer, Quantitative [[id number]]  (Normal) Collected: 04/30/25 1140    Specimen: Blood from Arm, Right Updated: 04/30/25 1417     D-Dimer, Quantitative <0.27 MCGFEU/mL     Narrative:      According to the assay 's published package insert, a normal (<0.50 MCGFEU/mL) D-dimer result in conjunction with a non-high clinical probability assessment, excludes deep vein thrombosis (DVT) and pulmonary embolism (PE) with high sensitivity.    D-dimer values increase with age and this can make VTE exclusion of an older population difficult. To address this, the [employer], based on best available evidence and recent guidelines, recommends that clinicians use age-adjusted D-dimer thresholds in patients greater than 50 years of age with: a) a low probability of PE who do not meet all Pulmonary Embolism Rule Out Criteria, or b) in those with intermediate probability of PE.   The formula for an age-adjusted D-dimer cut-off is \"age/100\".  For example, a 60 year old patient would have an age-adjusted cut-off of 0.60 MCGFEU/mL and an 80 year old 0.80 MCGFEU/mL.    High Sensitivity Troponin T 1Hr [[id number]]  (Normal) Collected: 04/30/25 1252    Specimen: Blood from Arm, Right Updated: 04/30/25 1316     HS Troponin T 9 ng/L      Troponin T Numeric Delta -1 ng/L     Narrative:      High Sensitive Troponin T Reference Range:  <14.0 ng/L- Negative Female for AMI  <22.0 ng/L- Negative Male for AMI  >=14 - Abnormal Female indicating possible myocardial injury.  >=22 - Abnormal Male indicating possible myocardial injury.   Clinicians would have to utilize clinical acumen, EKG, Troponin, and serial changes to determine if it is an Acute Myocardial Infarction or myocardial injury due to an underlying chronic condition.         Respiratory Panel PCR w/COVID-19(SARS-CoV-2) [facility]/LITA In-House, NP Swab in UTM/VTM, 2 HR TAT - Swab, Nasopharynx [[id number]]  (Normal) Collected: 04/30/25 1140 "    Specimen: Swab from Nasopharynx Updated: 04/30/25 1232     ADENOVIRUS, PCR Not Detected     Coronavirus 229E Not Detected     Coronavirus HKU1 Not Detected     Coronavirus NL63 Not Detected     Coronavirus OC43 Not Detected     COVID19 Not Detected     Human Metapneumovirus Not Detected     Human Rhinovirus/Enterovirus Not Detected     Influenza A PCR Not Detected     Influenza B PCR Not Detected     Parainfluenza Virus 1 Not Detected     Parainfluenza Virus 2 Not Detected     Parainfluenza Virus 3 Not Detected     Parainfluenza Virus 4 Not Detected     RSV, PCR Not Detected     Bordetella pertussis pcr Not Detected     Bordetella parapertussis PCR Not Detected     Chlamydophila pneumoniae PCR Not Detected     Mycoplasma pneumo by PCR Not Detected    Narrative:      In the setting of a positive respiratory panel with a viral infection PLUS a negative procalcitonin without other underlying concern for bacterial infection, consider observing off antibiotics or discontinuation of antibiotics and continue supportive care. If the respiratory panel is positive for atypical bacterial infection (Bordetella pertussis, Chlamydophila pneumoniae, or Mycoplasma pneumoniae), consider antibiotic de-escalation to target atypical bacterial infection.    Valproic Acid Level, Total [[id number]]  (Abnormal) Collected: 04/30/25 1140    Specimen: Blood from Arm, Right Updated: 04/30/25 1225     Valproic Acid <2.8 mcg/mL     Narrative:      Therapeutic Ranges for Valproic Acid    Epilepsy:       mcg/ml  Bipolar/Janny  up to 125 mcg/ml      Comprehensive Metabolic Panel [[id number]]  (Abnormal) Collected: 04/30/25 1140    Specimen: Blood from Arm, Right Updated: 04/30/25 1224     Glucose 104 mg/dL      BUN 22 mg/dL      Creatinine 1.18 mg/dL      Sodium 136 mmol/L      Potassium 3.5 mmol/L      Chloride 99 mmol/L      CO2 22.3 mmol/L      Calcium 10.6 mg/dL      Total Protein 8.3 g/dL      Albumin 4.8 g/dL      ALT (SGPT) 37 U/L       AST (SGOT) 27 U/L      Alkaline Phosphatase 108 U/L      Total Bilirubin 0.5 mg/dL      Globulin 3.5 gm/dL      A/G Ratio 1.4 g/dL      BUN/Creatinine Ratio 18.6     Anion Gap 14.7 mmol/L      eGFR 73.8 mL/min/1.73     Narrative:      GFR Categories in Chronic Kidney Disease (CKD)      GFR Category          GFR (mL/min/1.73)    Interpretation  G1                     90 or greater         Normal or high (1)  G2                      60-89                Mild decrease (1)  G3a                   45-59                Mild to moderate decrease  G3b                   30-44                Moderate to severe decrease  G4                    15-29                Severe decrease  G5                    14 or less           Kidney failure          (1)In the absence of evidence of kidney disease, neither GFR category G1 or G2 fulfill the criteria for CKD.    eGFR calculation 2021 CKD-EPI creatinine equation, which does not include race as a factor    BNP [[id number]]  (Normal) Collected: 04/30/25 1140    Specimen: Blood from Arm, Right Updated: 04/30/25 1224     proBNP 150.0 pg/mL     Narrative:      This assay is used as an aid in the diagnosis of individuals suspected of having heart failure. It can be used as an aid in the diagnosis of acute decompensated heart failure (ADHF) in patients presenting with signs and symptoms of ADHF to the emergency department (ED). In addition, NT-proBNP of <300 pg/mL indicates ADHF is not likely.    Age Range Result Interpretation  NT-proBNP Concentration (pg/mL:      <50             Positive            >450                   Gray                 300-450                    Negative             <300    50-75           Positive            >900                  Gray                300-900                  Negative            <300      >75             Positive            >1800                  Gray                300-1800                  Negative            <300    High Sensitivity Troponin T  [[id number]]  (Normal) Collected: 04/30/25 1140    Specimen: Blood from Arm, Right Updated: 04/30/25 1224     HS Troponin T 10 ng/L     Narrative:      High Sensitive Troponin T Reference Range:  <14.0 ng/L- Negative Female for AMI  <22.0 ng/L- Negative Male for AMI  >=14 - Abnormal Female indicating possible myocardial injury.  >=22 - Abnormal Male indicating possible myocardial injury.   Clinicians would have to utilize clinical acumen, EKG, Troponin, and serial changes to determine if it is an Acute Myocardial Infarction or myocardial injury due to an underlying chronic condition.         Magnesium [[id number]]  (Normal) Collected: 04/30/25 1140    Specimen: Blood from Arm, Right Updated: 04/30/25 1224     Magnesium 2.0 mg/dL     Phosphorus [[id number]]  (Normal) Collected: 04/30/25 1140    Specimen: Blood from Arm, Right Updated: 04/30/25 1224     Phosphorus 3.8 mg/dL     Urinalysis With Culture If Indicated - Urine, Clean Catch [[id number]]  (Abnormal) Collected: 04/30/25 1149    Specimen: Urine, Clean Catch Updated: 04/30/25 1158     Color, UA Yellow     Appearance, UA Clear     pH, UA <=5.0     Specific Gravity, UA 1.023     Glucose, UA Negative     Ketones, UA Trace     Bilirubin, UA Negative     Blood, UA Negative     Protein, UA >=300 mg/dL (3+)     Leuk Esterase, UA Negative     Nitrite, UA Negative     Urobilinogen, UA 0.2 E.U./dL    Narrative:      In absence of clinical symptoms, the presence of pyuria, bacteria, and/or nitrites on the urinalysis result does not correlate with infection.    Urinalysis, Microscopic Only - Urine, Clean Catch [[id number]] Collected: 04/30/25 1149    Specimen: Urine, Clean Catch Updated: 04/30/25 1158     RBC, UA 0-2 /HPF      WBC, UA 0-2 /HPF      Comment: Urine culture not indicated.        Bacteria, UA None Seen /HPF      Squamous Epithelial Cells, UA 0-2 /HPF      Hyaline Casts, UA 0-2 /LPF      Methodology Automated Microscopy    Protime-INR [[id number]]  (Normal)  Collected: 04/30/25 1140    Specimen: Blood from Arm, Right Updated: 04/30/25 1155     Protime 13.7 Seconds      INR 1.06    CBC & Differential [[id number]]  (Normal) Collected: 04/30/25 1140    Specimen: Blood from Arm, Right Updated: 04/30/25 1147    Narrative:      The following orders were created for panel order CBC & Differential.  Procedure                               Abnormality         Status                     ---------                               -----------         ------                     CBC Auto Differential[[id number]]        Normal              Final result                 Please view results for these tests on the individual orders.    CBC Auto Differential [[id number]]  (Normal) Collected: 04/30/25 1140    Specimen: Blood from Arm, Right Updated: 04/30/25 1147     WBC 6.47 10*3/mm3      RBC 4.92 10*6/mm3      Hemoglobin 15.1 g/dL      Hematocrit 43.4 %      MCV 88.2 fL      MCH 30.7 pg      MCHC 34.8 g/dL      RDW 13.1 %      RDW-SD 42.2 fl      MPV 9.6 fL      Platelets 251 10*3/mm3      Neutrophil % 56.2 %      Lymphocyte % 30.9 %      Monocyte % 10.2 %      Eosinophil % 1.7 %      Basophil % 0.5 %      Immature Grans % 0.5 %      Neutrophils, Absolute 3.64 10*3/mm3      Lymphocytes, Absolute 2.00 10*3/mm3      Monocytes, Absolute 0.66 10*3/mm3      Eosinophils, Absolute 0.11 10*3/mm3      Basophils, Absolute 0.03 10*3/mm3      Immature Grans, Absolute 0.03 10*3/mm3      nRBC 0.0 /100 WBC             Imaging Results (Last 24 Hours)       Procedure Component Value Units Date/Time    XR Chest 1 View [[id number]] Collected: 04/30/25 1157     Updated: 04/30/25 1200    Narrative:      XR CHEST 1 VW    Date of Exam: 4/30/2025 11:56 AM EDT    Indication: SOA with exertion    Comparison: 8/14/2023    Findings:  The cardiomediastinal silhouette is within normal limits. Lungs are clear. No focal consolidation, pneumothorax, or significant pleural effusion. Osseous structures grossly intact.       Impression:      Impression:  No acute process.          Electronically Signed: Noah Claros MD    4/30/2025 11:58 AM EDT    Workstation ID: [id number]            LAB RESULTS (LAST 7 DAYS)    CBC  Results from last 7 days   Lab Units 05/01/25  0530 04/30/25  1140   WBC 10*3/mm3 9.27 6.47   RBC 10*6/mm3 4.66 4.92   HEMOGLOBIN g/dL 14.0 15.1   HEMATOCRIT % 41.7 43.4   MCV fL 89.5 88.2   PLATELETS 10*3/mm3 228 251       BMP  Results from last 7 days   Lab Units 05/01/25  0530 04/30/25  1140   SODIUM mmol/L 137 136   POTASSIUM mmol/L 3.5 3.5   CHLORIDE mmol/L 100 99   CO2 mmol/L 22.3 22.3   BUN mg/dL 28* 22*   CREATININE mg/dL 1.28* 1.18   GLUCOSE mg/dL 104* 104*   MAGNESIUM mg/dL  --  2.0   PHOSPHORUS mg/dL  --  3.8       CMP   Results from last 7 days   Lab Units 05/01/25  0530 04/30/25  1140   SODIUM mmol/L 137 136   POTASSIUM mmol/L 3.5 3.5   CHLORIDE mmol/L 100 99   CO2 mmol/L 22.3 22.3   BUN mg/dL 28* 22*   CREATININE mg/dL 1.28* 1.18   GLUCOSE mg/dL 104* 104*   ALBUMIN g/dL  --  4.8   BILIRUBIN mg/dL  --  0.5   ALK PHOS U/L  --  108   AST (SGOT) U/L  --  27   ALT (SGPT) U/L  --  37       BNP        TROPONIN  Results from last 7 days   Lab Units 04/30/25  1252   HSTROP T ng/L 9       CoAg  Results from last 7 days   Lab Units 04/30/25  1140   INR  1.06       Creatinine Clearance  Estimated Creatinine Clearance: 75.5 mL/min (A) (by C-G formula based on SCr of 1.28 mg/dL (H)).    ABG          Radiology  XR Chest 1 View  Result Date: 4/30/2025  Impression: No acute process. Electronically Signed: Noah Claros MD  4/30/2025 11:58 AM EDT  Workstation ID: [id number]        EKG  I personally viewed and interpreted the patient's EKG/Telemetry data:  ECG 12 Lead Chest Pain   Final Result   HEART RATE=91  bpm   RR Ivbtngxm=704  ms   AL Ywbzxgzw=847  ms   P Horizontal Axis=-1  deg   P Front Axis=29  deg   QRSD Omaqdpfi=679  ms   QT Hrtfdzzg=662  ms   REdE=747  ms   QRS Axis=9  deg   T Wave Axis=31  deg   - ABNORMAL ECG -   Sinus  rhythm   Inferior  infarct, old   Anterior  infarct, old   When compared with ECG of 16-Feb-2024 19:26:04,   Significant axis, voltage or hypertrophy change   Electronically Signed By: David Marquis ([facility]) 2025-05-01 06:56:32   Date and Time of Study:2025-04-30 11:01:20      Telemetry Scan   Final Result      Telemetry Scan   Final Result      Telemetry Scan   Final Result      Telemetry Scan   Final Result      Telemetry Scan   Final Result            Echocardiogram:    Results for orders placed during the hospital encounter of 04/30/25    ADULT TRANSTHORACIC ECHO COMPLETE W/ CONT IF NECESSARY PER PROTOCOL    Interpretation Summary    Left ventricular ejection fraction appears to be 56 - 60%.    Left ventricular diastolic function was normal.    Estimated right ventricular systolic pressure from tricuspid regurgitation is normal (<35 mmHg).    Electronically signed by Kraan Ricardo MD, 05/01/25, 9:28 AM EDT.        Stress Test:  Results for orders placed during the hospital encounter of 02/23/24    Stress Test With Myocardial Perfusion One Day    Interpretation Summary    Findings consistent with a normal ECG stress test.    Left ventricular ejection fraction is normal (Calculated EF = 66%).    Myocardial perfusion imaging indicates a moderate-sized, severe area of ischemia located in the inferior wall.    Impressions are consistent with an intermediate risk study.        Cardiac Catheterization:  Results for orders placed during the hospital encounter of 04/04/24    Cardiac Catheterization/Vascular Study    Conclusion  OPERATORS:  1. Enrique To M.D., Attending Cardiologist      PROCEDURE PERFORMED.  Ultrasound guided vascular access  Left heart catheterization  Coronary Angiogram [id number]  Moderate Sedation    INDICATIONS FOR PROCEDURE.  53 years old man with chest pain and abnormal stress test. After discussing the risks and benefits of the procedure he was brought in for coronary  angiography.    PROCEDURE IN DETAIL.  Informed consent was obtained from the patient after explaining the risks, benefits, and alternative options of the procedure. After obtaining informed consent, the patient was brought to the cath lab and was prepped in a sterile fashion. Lidocaine 1% was used for local anesthesia into the right radial access site. The right radial artery was accessed under direct ultrasound visualization  with an angiocath needle via modified Seldinger technique. A 6F slender sheath was inserted successfully. Afterwards, 6F JR4  was advanced over a wire into the ascending aorta and used to cross the AV and obtain LV pressures.  AV gradient obtained via pullback technique. JR4 and JL3.5 diagnostic catheters were used to engage the ostia of the RCA and LM respectively. Images of the right and left coronary systems were obtained. All the catheters were exchanged over a wire and subsequently removed. The patient tolerated the procedure well without any complications. The pictures were reviewed at the end of the procedure. TR band applied to right wrist for hemostasis and inflated with 15 cc of air. No complications were encountered.    HEMODYNAMICS.  LV: 114/5, 11 mmHg  AO: 130/83, 98 mmHg  No significant gradient across the aortic valve during pullback of JR4 catheter.  LV gram was not performed due to recent echocardiogram.    FINDINGS.    Coronary Angiogram.    1. Left main. Left main is a large-caliber vessel which gives rise to the Left Anterior Descending and the Left circumflex.  Left main is angiographically free from any significant disease    2. Left Anterior Descending Artery. LAD is a large vessel which gives rise to several septal perforators and several diagonal branches. It is angiographically free from any significant disease    3. Left Circumflex. The LCx is a medium caliber which gives rise to marginals.  Left circumflex artery is angiographically free from any significant  disease    4. Right Coronary Artery. The RCA is a dominant vessel which gives rise to several small caliber branches including PDA and PLV.  Right coronary artery is angiographically free from any significant disease.    IMPRESSIONS.  1. Non-obstructive CAD  2.  Normal LVEDP    RECOMMENDATIONS.  1. Continue aggressive risk factor modification for primary prevention.  2. Exercise and lifestyle modifications recommended.  3.  Definitely needs better lipid control/treatment for hypertriglyceridemia    Electronically signed by Enrique To MD, 04/04/24, 1:02 PM EDT.        Other:      ASSESSMENT & PLAN:    Principal Problem:    Dyspnea on exertion    Chest pain  Shortness of breath  Dyspnea on exertion  ECG with no acute ischemia  Previous nuclear stress test was abnormal.  Cardiac catheterization in 2024 with no evidence of obstructive coronary disease  Echocardiogram shows normal LV function, normal diastolic function and no significant valvular abnormalities.  No evidence of pulmonary hypertension.  proBNP is normal, troponin is negative, D-dimer is negative  Noncardiac causes of symptoms should be explored.  No further cardiac workup is needed at this time    DVT  Extensive DVT of the left leg  He is status post partially successful mechanical thrombectomy on 2/5/2024  Repeat attempted mechanical thrombectomy was aborted in March 2024  Switched from Eliquis to warfarin: Goal INR 3.    Current INR is only 1  Encouraged ambulation and leg exercises.     Uncontrolled hypertension  Continue amlodipine, Coreg, chlorthalidone, clonidine.  Previously on hydralazine  Blood pressure and heart rate are normal     Hyperlipidemia  Continue statin  , HDL 39, triglycerides 440 and total cholesterol 357.  Resume Vascepa  Obtain approval for Repatha/Praluent as outpatient     Solitary kidney  Patient was born with a solitary kidney  Creatinine 1.3, GFR is 66.9  Closely monitor renal function while on diuretics      Gout  Continue allopurinol     Overweight  BMI is 26.8. Weight is 176lbs. down 15 pounds  Dietary changes and lifestyle modifications discussed with the patient.  Screening and treatment for sleep apnea also suggested    Enrique To MD  05/01/25  09:41 EDT

## 2025-05-01 NOTE — PROGRESS NOTES
Encounter Date:05/05/2025        Patient ID: Spencer Brothers [id number]      Chief Complaint:  dyspnea on exertion      History of Present Illness:  Spencer Brothers is a 54-year-old male with a history of hypertension, hyperlipidemia, acute lower extremity DVT status post mechanical thrombectomy (2/5/2024) who presents to the office for 1 year follow-up. Of note, the patient was without insurance for a time and could not afford his medications. He states he recently obtained insurance and was restarted on his medications by his new PCP, ANA LUISA Begum. He states he was recently admitted to [facility] with dyspnea on exertion, nausea and vomiting. He had an echocardiogram that showed preserved LVEF with normal diastolic function and no significant valvular abnormalities. He denies any associated abdominal pain, constipation or diarrhea. He states he was discharged with referrals to pulmonology and sleep medicine. Today he is complaining of extreme fatigue. He also reports left leg swelling, but states it is chronic. He denies any chest pain or shortness of breath at rest.       Current cardiac medications include:  amlodipine, carvedilol, chlorthalidone, clonidine, and warfarin.        The following portions of the patient's history were reviewed and updated as appropriate: allergies, current medications, past family history, past medical history, past social history, past surgical history, and problem list.      Review of systems:  Review of Systems   Constitutional: Positive for malaise/fatigue.   Cardiovascular:  Positive for leg swelling. Negative for chest pain, dyspnea on exertion and palpitations.   Respiratory:  Positive for shortness of breath. Negative for cough.    Gastrointestinal:  Positive for vomiting. Negative for abdominal pain and nausea.   Neurological:  Positive for dizziness. Negative for headaches, light-headedness, numbness and weakness.   All other systems reviewed and are  negative.        Current Outpatient Medications:     acetaminophen (TYLENOL) 325 MG tablet, Take 2 tablets by mouth Every 6 (Six) Hours As Needed for Mild Pain., Disp: , Rfl:     allopurinol (ZYLOPRIM) 300 MG tablet, Take 1 tablet by mouth Daily., Disp: , Rfl:     amLODIPine (NORVASC) 10 MG tablet, Take 1 tablet by mouth Daily., Disp: 90 tablet, Rfl: 3    carvedilol (COREG) 25 MG tablet, Take 1 tablet by mouth 2 (Two) Times a Day With Meals for 360 days., Disp: 180 tablet, Rfl: 3    chlorthalidone (HYGROTON) 25 MG tablet, Take 1 tablet by mouth Daily., Disp: 90 tablet, Rfl: 3    Cholecalciferol (VITAMIN D-3 PO), Take 1 capsule by mouth Daily., Disp: , Rfl:     cloNIDine (CATAPRES) 0.2 MG tablet, Take 1 tablet by mouth 2 (Two) Times a Day., Disp: 180 tablet, Rfl: 3    icosapent ethyl (VASCEPA) 1 g capsule capsule, Take 2 g by mouth 2 (Two) Times a Day With Meals., Disp: 360 capsule, Rfl: 3    multivitamin with minerals tablet tablet, Take 1 tablet by mouth Daily., Disp: , Rfl:     omeprazole (priLOSEC) 20 MG capsule, Take 1 capsule by mouth 2 (Two) Times a Day., Disp: , Rfl:     rosuvastatin (CRESTOR) 20 MG tablet, Take 1 tablet by mouth Daily., Disp: 90 tablet, Rfl: 3    warfarin (COUMADIN) 7.5 MG tablet, Indications: Atrial Fibrillation, history of DVT/PE, Disp: 20 tablet, Rfl: 0    Current outpatient and discharge medications have been reconciled for the patient.  Reviewed by: ANA LUISA Madrid       Allergies   Allergen Reactions    Lisinopril Angioedema       Family History   Problem Relation Age of Onset    Heart disease Mother     Heart attack Mother 53    Breast cancer Mother 50    Heart disease Maternal Uncle     Heart attack Maternal Uncle     Diabetes Maternal Uncle        Past Surgical History:   Procedure Laterality Date    CARDIAC CATHETERIZATION N/A 02/05/2024    Procedure: Percutaneous Mechanical Thrombectomy;  Surgeon: Enrique To MD;  Location: [facility] CATH INVAS[facility];  Service:  "Cardiovascular;  Laterality: N/A;    CARDIAC CATHETERIZATION N/A 03/08/2024    Procedure: Percutaneous Mechanical Thrombectomy;  Surgeon: Enrique To MD;  Location:  [facility] CATH INVASIVE LOCATION;  Service: Cardiovascular;  Laterality: N/A;    CARDIAC CATHETERIZATION Right 04/04/2024    Procedure: Left Heart Cath with Coronary Angiography;  Surgeon: Enrique To MD;  Location:  [facility] CATH INVASIVE LOCATION;  Service: Cardiovascular;  Laterality: Right;    COLONOSCOPY      KNEE SURGERY      NEPHRECTOMY      SHOULDER SURGERY      VEIN SURGERY         Past Medical History:   Diagnosis Date    Allergic     Arthritis     Chronic kidney disease 2010    DVT (deep venous thrombosis) 02/05/2024    GERD (gastroesophageal reflux disease)     Hyperlipidemia     Hypertension        Family History   Problem Relation Age of Onset    Heart disease Mother     Heart attack Mother 53    Breast cancer Mother 50    Heart disease Maternal Uncle     Heart attack Maternal Uncle     Diabetes Maternal Uncle        Social History     Socioeconomic History    Marital status:    Tobacco Use    Smoking status: Never     Passive exposure: Never    Smokeless tobacco: Never   Vaping Use    Vaping status: Never Used   Substance and Sexual Activity    Alcohol use: Yes     Comment: Occasional    Drug use: Never    Sexual activity: Defer               Objective:     Vital Signs:  /77 (BP Location: Left arm, Patient Position: Sitting, Cuff Size: Adult)   Pulse 77   Ht 172.7 cm (68\")   Wt 79.5 kg (175 lb 3.7 oz)   SpO2 97%   BMI 26.64 kg/m²     Physical Exam:  Vitals reviewed.   Constitutional:       Appearance: Healthy appearance. Well-developed and well-groomed.   Eyes:      Conjunctiva/sclera: Conjunctivae normal.      Pupils: Pupils are equal, round, and reactive to light.      Comments: Wears glasses   HENT:      Head: Normocephalic and atraumatic.    Mouth/Throat:      Mouth: Mucous membranes are moist.      Pharynx: Oropharynx " is clear.   Pulmonary:      Effort: Pulmonary effort is normal.      Breath sounds: Normal breath sounds.   Cardiovascular:      PMI at left midclavicular line. Normal rate. Regular rhythm.      Murmurs: There is no murmur.   Pulses:     Intact distal pulses.   Edema:     Thigh: 1+ edema of the left thigh.     Pretibial: 1+ edema of the left pretibial area.  Abdominal:      General: Bowel sounds are normal.      Palpations: Abdomen is soft.   Musculoskeletal: Normal range of motion.      Cervical back: Normal range of motion and neck supple. Skin:     General: Skin is warm and dry.   Neurological:      Mental Status: Alert and oriented to person, place, and time.   Psychiatric:         Mood and Affect: Mood normal.         Behavior: Behavior normal.              Review of Lab results:    CBC  Results from last 7 days   Lab Units 05/01/25  0530 04/30/25  1140   WBC 10*3/mm3 9.27 6.47   RBC 10*6/mm3 4.66 4.92   HEMOGLOBIN g/dL 14.0 15.1   HEMATOCRIT % 41.7 43.4   MCV fL 89.5 88.2   PLATELETS 10*3/mm3 228 251       BMP  Results from last 7 days   Lab Units 05/01/25  0530 04/30/25  1140   SODIUM mmol/L 137 136   POTASSIUM mmol/L 3.5 3.5   CHLORIDE mmol/L 100 99   CO2 mmol/L 22.3 22.3   BUN mg/dL 28* 22*   CREATININE mg/dL 1.28* 1.18   GLUCOSE mg/dL 104* 104*   MAGNESIUM mg/dL  --  2.0   PHOSPHORUS mg/dL  --  3.8       CMP   Results from last 7 days   Lab Units 05/01/25  0530 04/30/25  1140   SODIUM mmol/L 137 136   POTASSIUM mmol/L 3.5 3.5   CHLORIDE mmol/L 100 99   CO2 mmol/L 22.3 22.3   BUN mg/dL 28* 22*   CREATININE mg/dL 1.28* 1.18   GLUCOSE mg/dL 104* 104*   ALBUMIN g/dL  --  4.8   BILIRUBIN mg/dL  --  0.5   ALK PHOS U/L  --  108   AST (SGOT) U/L  --  27   ALT (SGPT) U/L  --  37         BNP        TROPONIN  Results from last 7 days   Lab Units 04/30/25  1252   HSTROP T ng/L 9       CoAg  Results from last 7 days   Lab Units 05/05/25  0952 05/01/25  0947 04/30/25  1140   INR  2.20* 1.15* 1.06       Creatinine  Clearance  Estimated Creatinine Clearance: 75 mL/min (A) (by C-G formula based on SCr of 1.28 mg/dL (H)).    ABG        Radiology  No radiology results for the last day      Review of cardiac studies:    Stress test  Results for orders placed during the hospital encounter of 02/23/24    Stress Test With Myocardial Perfusion One Day    Interpretation Summary    Findings consistent with a normal ECG stress test.    Left ventricular ejection fraction is normal (Calculated EF = 66%).    Myocardial perfusion imaging indicates a moderate-sized, severe area of ischemia located in the inferior wall.    Impressions are consistent with an intermediate risk study.      Echocardiogram  Results for orders placed during the hospital encounter of 04/30/25    ADULT TRANSTHORACIC ECHO COMPLETE W/ CONT IF NECESSARY PER PROTOCOL    Interpretation Summary    Left ventricular ejection fraction appears to be 56 - 60%.    Left ventricular diastolic function was normal.    Estimated right ventricular systolic pressure from tricuspid regurgitation is normal (<35 mmHg).    Electronically signed by Karan Ricardo MD, 05/01/25, 9:28 AM EDT.      Cardiac catheterization  Results for orders placed during the hospital encounter of 04/04/24    Cardiac Catheterization/Vascular Study    Conclusion  OPERATORS:  1. Enrique To M.D., Attending Cardiologist      PROCEDURE PERFORMED.  Ultrasound guided vascular access  Left heart catheterization  Coronary Angiogram 69637  Moderate Sedation    INDICATIONS FOR PROCEDURE.  53 years old man with chest pain and abnormal stress test. After discussing the risks and benefits of the procedure he was brought in for coronary angiography.    PROCEDURE IN DETAIL.  Informed consent was obtained from the patient after explaining the risks, benefits, and alternative options of the procedure. After obtaining informed consent, the patient was brought to the cath lab and was prepped in a sterile  fashion. Lidocaine 1% was used for local anesthesia into the right radial access site. The right radial artery was accessed under direct ultrasound visualization  with an angiocath needle via modified Seldinger technique. A 6F slender sheath was inserted successfully. Afterwards, 6F JR4  was advanced over a wire into the ascending aorta and used to cross the AV and obtain LV pressures.  AV gradient obtained via pullback technique. JR4 and JL3.5 diagnostic catheters were used to engage the ostia of the RCA and LM respectively. Images of the right and left coronary systems were obtained. All the catheters were exchanged over a wire and subsequently removed. The patient tolerated the procedure well without any complications. The pictures were reviewed at the end of the procedure. TR band applied to right wrist for hemostasis and inflated with 15 cc of air. No complications were encountered.    HEMODYNAMICS.  LV: 114/5, 11 mmHg  AO: 130/83, 98 mmHg  No significant gradient across the aortic valve during pullback of JR4 catheter.  LV gram was not performed due to recent echocardiogram.    FINDINGS.    Coronary Angiogram.    1. Left main. Left main is a large-caliber vessel which gives rise to the Left Anterior Descending and the Left circumflex.  Left main is angiographically free from any significant disease    2. Left Anterior Descending Artery. LAD is a large vessel which gives rise to several septal perforators and several diagonal branches. It is angiographically free from any significant disease    3. Left Circumflex. The LCx is a medium caliber which gives rise to marginals.  Left circumflex artery is angiographically free from any significant disease    4. Right Coronary Artery. The RCA is a dominant vessel which gives rise to several small caliber branches including PDA and PLV.  Right coronary artery is angiographically free from any significant disease.    IMPRESSIONS.  1. Non-obstructive CAD  2.  Normal  LVEDP    RECOMMENDATIONS.  1. Continue aggressive risk factor modification for primary prevention.  2. Exercise and lifestyle modifications recommended.  3.  Definitely needs better lipid control/treatment for hypertriglyceridemia    Electronically signed by Enrique To MD, 04/04/24, 1:02 PM EDT.          Assessment and Plan       Diagnoses and all orders for this visit:    1. Dyspnea on exertion (Primary)    2. Essential hypertension  -     carvedilol (COREG) 25 MG tablet; Take 1 tablet by mouth 2 (Two) Times a Day With Meals for 360 days.  Dispense: 180 tablet; Refill: 3    3. Mixed hyperlipidemia    4. Recurrent acute deep vein thrombosis (DVT) of left lower extremity    Other orders  -     rosuvastatin (CRESTOR) 20 MG tablet; Take 1 tablet by mouth Daily.  Dispense: 90 tablet; Refill: 3  -     icosapent ethyl (VASCEPA) 1 g capsule capsule; Take 2 g by mouth 2 (Two) Times a Day With Meals.  Dispense: 360 capsule; Refill: 3  -     chlorthalidone (HYGROTON) 25 MG tablet; Take 1 tablet by mouth Daily.  Dispense: 90 tablet; Refill: 3  -     amLODIPine (NORVASC) 10 MG tablet; Take 1 tablet by mouth Daily.  Dispense: 90 tablet; Refill: 3  -     cloNIDine (CATAPRES) 0.2 MG tablet; Take 1 tablet by mouth 2 (Two) Times a Day.  Dispense: 180 tablet; Refill: 3         Dyspnea on exertion   Cardiac catheterization in April 2024 shows no evidence of obstructive coronary disease.  Echocardiogram shows preserved LVEF with normal diastolic function and no significant valvular abnormalities.   Ambulatory referrals to pulmonology and sleep medicine pending     History of left lower extremity DVT  He is status post partially successful mechanical thrombectomy on 2/5/2024  Repeat attempted mechanical thrombectomy was aborted in March 2024  He was switched from Eliquis to warfarin  INR today is 2.20  Goal INR 3.0     Primary Hypertension, chronic  The patient's blood pressure is soft today.   He is unsure of his current medications,  but thinks he is taking amlodipine, carvedilol, chlorthalidone and clonidine.   He will check his medications at home against the list I printed out for him.  He will notify us if there are any changes.   He was advised to monitor his blood pressure at home and keep a log, then report back to us after 1-2 weeks.    Hyperlipidemia  Recent lipid panel shows total cholesterol 357, triglycerides 440, HDL 39,   The patient states he was uninsured and had stopped taking his medication.  He is now insured and will be restarted on rosuvastatin and Vascepa.   Recommend repeat fasting lipid panel in 6 months.   Goal LDL less than 100     Solitary kidney  Patient was born with a solitary kidney    Gout  Continue allopurinol     Overweight  BMI is 26.64. He weighs 175 lbs.  Dietary changes and lifestyle modifications discussed with the patient.    Nausea and vomiting  Etiology unclear  The patient denies any associated abdominal pain, constipation, diarrhea or fever.  Recommend follow-up with PCP      Electronically signed by ANA LUISA Madrid, 05/05/25, 10:09 AM EDT.

## 2025-05-01 NOTE — DISCHARGE SUMMARY
[facility]    Strickland Kaleigh ANA LUISA DOWNEY    CHIEF COMPLAINT:     Dyspnea with exertion    HISTORY OF PRESENT ILLNESS:    Vomiting   Pertinent negatives include no chest pain.       Ed  54-year-old  male with a history of DVT, CKD, hypertension, hyperlipidemia, and GERD who presents to the emergency department with complaints of dyspnea with exertion for 2 to 3 weeks and episodes of projectile vomiting last night.  Patient states that yesterday he had been sweating profusely through the day and had the episode of vomiting when he got home from work.  Patient states some transient chest pain throughout the last few weeks but states that it is not uncommon for him to have minor episodes of chest pain.  Patient states neck pain has been increasing during that time; unknown if he had any.  Patient states left foot swelling is chronic for him, stating he will be swollen significantly at the end of the day if he is on his feet; denies any right foot swelling.  Patient denies any nausea, diarrhea, or abdominal pain, and denies any shortness of breath at rest.   Patient been previously prescribed warfarin for DVT treatment but states he has not been able to take the medication for approximately 8 months due to an insurance change.       Past Medical History:   Diagnosis Date    Allergic     Arthritis     Chronic kidney disease 2010    DVT (deep venous thrombosis) 02/05/2024    GERD (gastroesophageal reflux disease)     Hyperlipidemia     Hypertension      Past Surgical History:   Procedure Laterality Date    CARDIAC CATHETERIZATION N/A 02/05/2024    Procedure: Percutaneous Mechanical Thrombectomy;  Surgeon: Enrique To MD;  Location: [facility] CATH INVASIVE LOCATION;  Service: Cardiovascular;  Laterality: N/A;    CARDIAC CATHETERIZATION N/A 03/08/2024    Procedure: Percutaneous Mechanical Thrombectomy;  Surgeon: Enrique To MD;  Location: [facility] CATH INVASIVE LOCATION;  Service: Cardiovascular;   Laterality: N/A;    CARDIAC CATHETERIZATION Right 04/04/2024    Procedure: Left Heart Cath with Coronary Angiography;  Surgeon: Enrique To MD;  Location: [facility] CATH INVASIVE LOCATION;  Service: Cardiovascular;  Laterality: Right;    COLONOSCOPY      KNEE SURGERY      NEPHRECTOMY      SHOULDER SURGERY      VEIN SURGERY       Family History   Problem Relation Age of Onset    Heart disease Mother     Heart attack Mother 53    Breast cancer Mother 50    Heart disease Maternal Uncle     Heart attack Maternal Uncle     Diabetes Maternal Uncle      Social History     Tobacco Use    Smoking status: Never     Passive exposure: Never    Smokeless tobacco: Never   Vaping Use    Vaping status: Never Used   Substance Use Topics    Alcohol use: Yes     Comment: Occasional    Drug use: Never     Medications Prior to Admission   Medication Sig Dispense Refill Last Dose/Taking    acetaminophen (TYLENOL) 325 MG tablet Take 2 tablets by mouth Every 6 (Six) Hours As Needed for Mild Pain.   4/29/2025    allopurinol (ZYLOPRIM) 300 MG tablet Take 1 tablet by mouth Daily.   4/30/2025    amLODIPine (NORVASC) 10 MG tablet Take 1 tablet by mouth Daily.   4/30/2025    carvedilol (COREG) 25 MG tablet Take 1 tablet by mouth 2 (Two) Times a Day With Meals for 360 days. 180 tablet 3 4/30/2025    chlorthalidone (HYGROTON) 25 MG tablet Take 1 tablet by mouth Daily. NEEDS AN APPOINTMENT FOR FURTHER REFILLS. 90 tablet 0 4/30/2025    Cholecalciferol (VITAMIN D-3 PO) Take 1 capsule by mouth Daily.   4/30/2025    cloNIDine (CATAPRES) 0.2 MG tablet Take 1 tablet by mouth 2 (Two) Times a Day.   4/30/2025    multivitamin with minerals tablet tablet Take 1 tablet by mouth Daily.   Taking    diclofenac (VOLTAREN) 50 MG EC tablet Take 1 tablet by mouth 2 (Two) Times a Day As Needed (Headache).   Unknown    divalproex (DEPAKOTE ER) 500 MG 24 hr tablet Take 1 tablet by mouth 2 (Two) Times a Day.       hydrALAZINE (APRESOLINE) 100 MG tablet Take 1 tablet by  mouth 3 (Three) Times a Day.       icosapent ethyl (VASCEPA) 1 g capsule capsule TAKE 2 CAPSULES BY MOUTH TWICE DAILY WITH MEALS 120 capsule 3     omeprazole (priLOSEC) 20 MG capsule Take 1 capsule by mouth 2 (Two) Times a Day.       rosuvastatin (CRESTOR) 20 MG tablet Take 1 tablet by mouth Daily. 90 tablet 3     warfarin (COUMADIN) 5 MG tablet Take 1 tablet by mouth Daily.        Allergies:  Lisinopril    Immunization History   Administered Date(s) Administered    COVID-19 (MODERNA) 1st,2nd,3rd Dose Monovalent 08/13/2021, 09/14/2021    COVID-19 (MODERNA) Monovalent Original Booster 05/25/2022           REVIEW OF SYSTEMS:    Review of Systems   Cardiovascular:  Positive for dyspnea on exertion. Negative for chest pain.   Respiratory:  Positive for shortness of breath.    Gastrointestinal:  Positive for vomiting.           Vital Signs  Temp:  [97.9 °F (36.6 °C)-98.2 °F (36.8 °C)] 98 °F (36.7 °C)  Heart Rate:  [] 84  Resp:  [13-16] 13  BP: (110-135)/(80-98) 110/91          Physical Exam:  Physical Exam  Constitutional:       Appearance: Normal appearance.   Cardiovascular:      Rate and Rhythm: Normal rate and regular rhythm.   Pulmonary:      Effort: Pulmonary effort is normal.      Breath sounds: Normal breath sounds.   Skin:     General: Skin is warm and dry.   Neurological:      General: No focal deficit present.      Mental Status: He is alert and oriented to person, place, and time. Mental status is at baseline.   Psychiatric:         Mood and Affect: Mood normal.         Behavior: Behavior normal.       Emotional Behavior:    wnl   Debilities:   None      Results Review:    I reviewed the patient's new clinical results.  Lab Results (most recent)       Procedure Component Value Units Date/Time    Lipid Panel [[id number]]  (Abnormal) Collected: 05/01/25 0530    Specimen: Blood Updated: 05/01/25 1028     Total Cholesterol 357 mg/dL      Triglycerides 440 mg/dL      HDL Cholesterol 39 mg/dL      LDL  Cholesterol  222 mg/dL      VLDL Cholesterol 96 mg/dL      LDL/HDL Ratio 5.90    Narrative:      Cholesterol Reference Ranges  (U.S. Department of Health and Human Services ATP III Classifications)    Desirable          <200 mg/dL  Borderline High    200-239 mg/dL  High Risk          >240 mg/dL      Triglyceride Reference Ranges  (U.S. Department of Health and Human Services ATP III Classifications)    Normal           <150 mg/dL  Borderline High  150-199 mg/dL  High             200-499 mg/dL  Very High        >500 mg/dL    HDL Reference Ranges  (U.S. Department of Health and Human Services ATP III Classifications)    Low     <40 mg/dl (major risk factor for CHD)  High    >60 mg/dl ('negative' risk factor for CHD)        LDL Reference Ranges  (U.S. Department of Health and Human Services ATP III Classifications)    Optimal          <100 mg/dL  Near Optimal     100-129 mg/dL  Borderline High  130-159 mg/dL  High             160-189 mg/dL  Very High        >189 mg/dL    LDL is calculated using the NIH LDL-C calculation.      Protime-INR [[id number]]  (Abnormal) Collected: 05/01/25 0947    Specimen: Blood from Arm, Left Updated: 05/01/25 1006     Protime 14.7 Seconds      INR 1.15    Basic Metabolic Panel [[id number]]  (Abnormal) Collected: 05/01/25 0530    Specimen: Blood Updated: 05/01/25 0706     Glucose 104 mg/dL      BUN 28 mg/dL      Creatinine 1.28 mg/dL      Sodium 137 mmol/L      Potassium 3.5 mmol/L      Chloride 100 mmol/L      CO2 22.3 mmol/L      Calcium 9.9 mg/dL      BUN/Creatinine Ratio 21.9     Anion Gap 14.7 mmol/L      eGFR 66.9 mL/min/1.73     Narrative:      GFR Categories in Chronic Kidney Disease (CKD)              GFR Category          GFR (mL/min/1.73)    Interpretation  G1                    90 or greater        Normal or high (1)  G2                    60-89                Mild decrease (1)  G3a                   45-59                Mild to moderate decrease  G3b                   30-44                 Moderate to severe decrease  G4                    15-29                Severe decrease  G5                    14 or less           Kidney failure    (1)In the absence of evidence of kidney disease, neither GFR category G1 or G2 fulfill the criteria for CKD.    eGFR calculation 2021 CKD-EPI creatinine equation, which does not include race as a factor    CBC & Differential [[id number]]  (Abnormal) Collected: 05/01/25 0530    Specimen: Blood Updated: 05/01/25 0633    Narrative:      The following orders were created for panel order CBC & Differential.  Procedure                               Abnormality         Status                     ---------                               -----------         ------                     CBC Auto Differential[[id number]]        Abnormal            Final result                 Please view results for these tests on the individual orders.    CBC Auto Differential [[id number]]  (Abnormal) Collected: 05/01/25 0530    Specimen: Blood Updated: 05/01/25 0633     WBC 9.27 10*3/mm3      RBC 4.66 10*6/mm3      Hemoglobin 14.0 g/dL      Hematocrit 41.7 %      MCV 89.5 fL      MCH 30.0 pg      MCHC 33.6 g/dL      RDW 13.2 %      RDW-SD 42.9 fl      MPV 10.2 fL      Platelets 228 10*3/mm3      Neutrophil % 70.6 %      Lymphocyte % 18.1 %      Monocyte % 9.0 %      Eosinophil % 1.5 %      Basophil % 0.5 %      Immature Grans % 0.3 %      Neutrophils, Absolute 6.54 10*3/mm3      Lymphocytes, Absolute 1.68 10*3/mm3      Monocytes, Absolute 0.83 10*3/mm3      Eosinophils, Absolute 0.14 10*3/mm3      Basophils, Absolute 0.05 10*3/mm3      Immature Grans, Absolute 0.03 10*3/mm3      nRBC 0.0 /100 WBC     D-dimer, Quantitative [[id number]]  (Normal) Collected: 04/30/25 1140    Specimen: Blood from Arm, Right Updated: 04/30/25 1417     D-Dimer, Quantitative <0.27 MCGFEU/mL     Narrative:      According to the assay 's published package insert, a normal (<0.50 MCGFEU/mL) D-dimer result in  "conjunction with a non-high clinical probability assessment, excludes deep vein thrombosis (DVT) and pulmonary embolism (PE) with high sensitivity.    D-dimer values increase with age and this can make VTE exclusion of an older population difficult. To address this, the American College [employer], based on best available evidence and recent guidelines, recommends that clinicians use age-adjusted D-dimer thresholds in patients greater than 50 years of age with: a) a low probability of PE who do not meet all Pulmonary Embolism Rule Out Criteria, or b) in those with intermediate probability of PE.   The formula for an age-adjusted D-dimer cut-off is \"age/100\".  For example, a 60 year old patient would have an age-adjusted cut-off of 0.60 MCGFEU/mL and an 80 year old 0.80 MCGFEU/mL.    High Sensitivity Troponin T 1Hr [[id number]]  (Normal) Collected: 04/30/25 1252    Specimen: Blood from Arm, Right Updated: 04/30/25 1316     HS Troponin T 9 ng/L      Troponin T Numeric Delta -1 ng/L     Narrative:      High Sensitive Troponin T Reference Range:  <14.0 ng/L- Negative Female for AMI  <22.0 ng/L- Negative Male for AMI  >=14 - Abnormal Female indicating possible myocardial injury.  >=22 - Abnormal Male indicating possible myocardial injury.   Clinicians would have to utilize clinical acumen, EKG, Troponin, and serial changes to determine if it is an Acute Myocardial Infarction or myocardial injury due to an underlying chronic condition.         Respiratory Panel PCR w/COVID-19(SARS-CoV-2) LEDY/BAL/YOEL/PAD/COR/LITA In-House, NP Swab in [facility], 2 HR TAT - Swab, Nasopharynx [[id number]]  (Normal) Collected: 04/30/25 1140    Specimen: Swab from Nasopharynx Updated: 04/30/25 1232     ADENOVIRUS, PCR Not Detected     Coronavirus 229E Not Detected     Coronavirus HKU1 Not Detected     Coronavirus NL63 Not Detected     Coronavirus OC43 Not Detected     COVID19 Not Detected     Human Metapneumovirus Not Detected     Human " Rhinovirus/Enterovirus Not Detected     Influenza A PCR Not Detected     Influenza B PCR Not Detected     Parainfluenza Virus 1 Not Detected     Parainfluenza Virus 2 Not Detected     Parainfluenza Virus 3 Not Detected     Parainfluenza Virus 4 Not Detected     RSV, PCR Not Detected     Bordetella pertussis pcr Not Detected     Bordetella parapertussis PCR Not Detected     Chlamydophila pneumoniae PCR Not Detected     Mycoplasma pneumo by PCR Not Detected    Narrative:      In the setting of a positive respiratory panel with a viral infection PLUS a negative procalcitonin without other underlying concern for bacterial infection, consider observing off antibiotics or discontinuation of antibiotics and continue supportive care. If the respiratory panel is positive for atypical bacterial infection (Bordetella pertussis, Chlamydophila pneumoniae, or Mycoplasma pneumoniae), consider antibiotic de-escalation to target atypical bacterial infection.    Valproic Acid Level, Total [[id number]]  (Abnormal) Collected: 04/30/25 1140    Specimen: Blood from Arm, Right Updated: 04/30/25 1225     Valproic Acid <2.8 mcg/mL     Narrative:      Therapeutic Ranges for Valproic Acid    Epilepsy:       mcg/ml  Bipolar/Janny  up to 125 mcg/ml      Comprehensive Metabolic Panel [[id number]]  (Abnormal) Collected: 04/30/25 1140    Specimen: Blood from Arm, Right Updated: 04/30/25 1224     Glucose 104 mg/dL      BUN 22 mg/dL      Creatinine 1.18 mg/dL      Sodium 136 mmol/L      Potassium 3.5 mmol/L      Chloride 99 mmol/L      CO2 22.3 mmol/L      Calcium 10.6 mg/dL      Total Protein 8.3 g/dL      Albumin 4.8 g/dL      ALT (SGPT) 37 U/L      AST (SGOT) 27 U/L      Alkaline Phosphatase 108 U/L      Total Bilirubin 0.5 mg/dL      Globulin 3.5 gm/dL      A/G Ratio 1.4 g/dL      BUN/Creatinine Ratio 18.6     Anion Gap 14.7 mmol/L      eGFR 73.8 mL/min/1.73     Narrative:      GFR Categories in Chronic Kidney Disease (CKD)      GFR Category           GFR (mL/min/1.73)    Interpretation  G1                     90 or greater         Normal or high (1)  G2                      60-89                Mild decrease (1)  G3a                   45-59                Mild to moderate decrease  G3b                   30-44                Moderate to severe decrease  G4                    15-29                Severe decrease  G5                    14 or less           Kidney failure          (1)In the absence of evidence of kidney disease, neither GFR category G1 or G2 fulfill the criteria for CKD.    eGFR calculation 2021 CKD-EPI creatinine equation, which does not include race as a factor    BNP [[id number]]  (Normal) Collected: 04/30/25 1140    Specimen: Blood from Arm, Right Updated: 04/30/25 1224     proBNP 150.0 pg/mL     Narrative:      This assay is used as an aid in the diagnosis of individuals suspected of having heart failure. It can be used as an aid in the diagnosis of acute decompensated heart failure (ADHF) in patients presenting with signs and symptoms of ADHF to the emergency department (ED). In addition, NT-proBNP of <300 pg/mL indicates ADHF is not likely.    Age Range Result Interpretation  NT-proBNP Concentration (pg/mL:      <50             Positive            >450                   Gray                 300-450                    Negative             <300    50-75           Positive            >900                  Gray                300-900                  Negative            <300      >75             Positive            >1800                  Gray                300-1800                  Negative            <300    High Sensitivity Troponin T [[id number]]  (Normal) Collected: 04/30/25 1140    Specimen: Blood from Arm, Right Updated: 04/30/25 1224     HS Troponin T 10 ng/L     Narrative:      High Sensitive Troponin T Reference Range:  <14.0 ng/L- Negative Female for AMI  <22.0 ng/L- Negative Male for AMI  >=14 - Abnormal Female indicating  possible myocardial injury.  >=22 - Abnormal Male indicating possible myocardial injury.   Clinicians would have to utilize clinical acumen, EKG, Troponin, and serial changes to determine if it is an Acute Myocardial Infarction or myocardial injury due to an underlying chronic condition.         Magnesium [[id number]]  (Normal) Collected: 04/30/25 1140    Specimen: Blood from Arm, Right Updated: 04/30/25 1224     Magnesium 2.0 mg/dL     Phosphorus [[id number]]  (Normal) Collected: 04/30/25 1140    Specimen: Blood from Arm, Right Updated: 04/30/25 1224     Phosphorus 3.8 mg/dL     Urinalysis With Culture If Indicated - Urine, Clean Catch [[id number]]  (Abnormal) Collected: 04/30/25 1149    Specimen: Urine, Clean Catch Updated: 04/30/25 1158     Color, UA Yellow     Appearance, UA Clear     pH, UA <=5.0     Specific Gravity, UA 1.023     Glucose, UA Negative     Ketones, UA Trace     Bilirubin, UA Negative     Blood, UA Negative     Protein, UA >=300 mg/dL (3+)     Leuk Esterase, UA Negative     Nitrite, UA Negative     Urobilinogen, UA 0.2 E.U./dL    Narrative:      In absence of clinical symptoms, the presence of pyuria, bacteria, and/or nitrites on the urinalysis result does not correlate with infection.    Urinalysis, Microscopic Only - Urine, Clean Catch [[id number]] Collected: 04/30/25 1149    Specimen: Urine, Clean Catch Updated: 04/30/25 1158     RBC, UA 0-2 /HPF      WBC, UA 0-2 /HPF      Comment: Urine culture not indicated.        Bacteria, UA None Seen /HPF      Squamous Epithelial Cells, UA 0-2 /HPF      Hyaline Casts, UA 0-2 /LPF      Methodology Automated Microscopy    Protime-INR [[id number]]  (Normal) Collected: 04/30/25 1140    Specimen: Blood from Arm, Right Updated: 04/30/25 1155     Protime 13.7 Seconds      INR 1.06    CBC & Differential [[id number]]  (Normal) Collected: 04/30/25 1140    Specimen: Blood from Arm, Right Updated: 04/30/25 1147    Narrative:      The following orders were created for  panel order CBC & Differential.  Procedure                               Abnormality         Status                     ---------                               -----------         ------                     CBC Auto Differential[[id number]]        Normal              Final result                 Please view results for these tests on the individual orders.    CBC Auto Differential [[id number]]  (Normal) Collected: 04/30/25 1140    Specimen: Blood from Arm, Right Updated: 04/30/25 1147     WBC 6.47 10*3/mm3      RBC 4.92 10*6/mm3      Hemoglobin 15.1 g/dL      Hematocrit 43.4 %      MCV 88.2 fL      MCH 30.7 pg      MCHC 34.8 g/dL      RDW 13.1 %      RDW-SD 42.2 fl      MPV 9.6 fL      Platelets 251 10*3/mm3      Neutrophil % 56.2 %      Lymphocyte % 30.9 %      Monocyte % 10.2 %      Eosinophil % 1.7 %      Basophil % 0.5 %      Immature Grans % 0.5 %      Neutrophils, Absolute 3.64 10*3/mm3      Lymphocytes, Absolute 2.00 10*3/mm3      Monocytes, Absolute 0.66 10*3/mm3      Eosinophils, Absolute 0.11 10*3/mm3      Basophils, Absolute 0.03 10*3/mm3      Immature Grans, Absolute 0.03 10*3/mm3      nRBC 0.0 /100 WBC             Imaging Results (Most Recent)       Procedure Component Value Units Date/Time    XR Chest 1 View [[id number]] Collected: 04/30/25 1157     Updated: 04/30/25 1200    Narrative:      XR CHEST 1 VW    Date of Exam: 4/30/2025 11:56 AM EDT    Indication: SOA with exertion    Comparison: 8/14/2023    Findings:  The cardiomediastinal silhouette is within normal limits. Lungs are clear. No focal consolidation, pneumothorax, or significant pleural effusion. Osseous structures grossly intact.      Impression:      Impression:  No acute process.          Electronically Signed: Noah Claros MD    4/30/2025 11:58 AM EDT    Workstation ID: [id number]          reviewed    ECG/EMG Results (most recent)       Procedure Component Value Units Date/Time    Telemetry Scan [[id number]] Resulted: 04/30/25     Updated:  04/30/25 1731    Telemetry Scan [[id number]] Resulted: 04/30/25     Updated: 05/01/25 0052    Telemetry Scan [[id number]] Resulted: 04/30/25     Updated: 05/01/25 0457    Telemetry Scan [[id number]] Resulted: 04/30/25     Updated: 05/01/25 0457    ECG 12 Lead Chest Pain [[id number]] Collected: 04/30/25 1101     Updated: 05/01/25 0656     QT Interval 365 ms      QTC Interval 449 ms     Narrative:      HEART RATE=91  bpm  RR Rcombuqq=372  ms  OK Loypliot=836  ms  P Horizontal Axis=-1  deg  P Front Axis=29  deg  QRSD Gbakprfd=501  ms  QT Tgdvxyvt=297  ms  LKzP=026  ms  QRS Axis=9  deg  T Wave Axis=31  deg  - ABNORMAL ECG -  Sinus rhythm  Inferior  infarct, old  Anterior  infarct, old  When compared with ECG of 16-Feb-2024 19:26:04,  Significant axis, voltage or hypertrophy change  Electronically Signed By: David Marquis ([facility]) 2025-05-01 06:56:32  Date and Time of Study:2025-04-30 11:01:20    Telemetry Scan [[id number]] Resulted: 04/30/25     Updated: 05/01/25 0752    ADULT TRANSTHORACIC ECHO COMPLETE W/ CONT IF NECESSARY PER PROTOCOL [[id number]] Resulted: 05/01/25 0929     Updated: 05/01/25 0929     LV GLOBAL STRAIN  -14.6 %      LVIDd 4.6 cm      LVIDs 3.2 cm      IVSd 1.00 cm      LVPWd 1.00 cm      FS 30.4 %      IVS/LVPW 1.00 cm      ESV(cubed) 32.8 ml      LV Sys Vol (BSA corrected) 18.2 cm2      EDV(cubed) 97.3 ml      LV Huitron Vol (BSA corrected) 45.6 cm2      LV mass(C)d 158.8 grams      LVOT area 3.1 cm2      LVOT diam 2.00 cm      EDV(MOD-sp4) 88.3 ml      ESV(MOD-sp4) 35.3 ml      SV(MOD-sp4) 53.0 ml      SVi(MOD-SP4) 27.4 ml/m2      SVi (LVOT) 22.6 ml/m2      EF(MOD-sp4) 60.0 %      MV E max morgan 51.9 cm/sec      MV A max morgan 79.1 cm/sec      MV dec time 0.19 sec      MV E/A 0.66     MV A dur 0.11 sec      Med Peak E' Morgan 6.4 cm/sec      Lat Peak E' Morgan 9.4 cm/sec      TR max morgan 146.0 cm/sec      Avg E/e' ratio 6.57     SV(LVOT) 43.7 ml      TAPSE (>1.6) 1.60 cm      RV S' 9.6 cm/sec      LA dimension (2D)  3.5 cm       LV V1 max 80.2 cm/sec      LV V1 max PG 2.6 mmHg      LV V1 mean PG 1.00 mmHg      LV V1 VTI 13.9 cm      Ao pk omaira 93.4 cm/sec      Ao max PG 3.5 mmHg      Ao mean PG 2.00 mmHg      Ao V2 VTI 17.6 cm      TARUN(I,D) 2.48 cm2      Dimensionless Index 0.86 (DI)      MV max PG 2.16 mmHg      MV mean PG 1.00 mmHg      MV V2 VTI 13.2 cm      MV P1/2t 37.9 msec      MVA(P1/2t) 5.8 cm2      MVA(VTI) 3.3 cm2      MV dec slope 377.0 cm/sec2      TR max PG 8.5 mmHg      RV V1 max PG 1.59 mmHg      RV V1 max 63.1 cm/sec      RV V1 VTI 15.6 cm      PA V2 max 81.3 cm/sec      PA acc time 0.12 sec      PI end-d omaira 95.5 cm/sec      ACS 2.30 cm      Sinus 3.9 cm      STJ 3.8 cm     Narrative:        Left ventricular ejection fraction appears to be 56 - 60%.    Left ventricular diastolic function was normal.    Estimated right ventricular systolic pressure from tricuspid   regurgitation is normal (<35 mmHg).    Electronically signed by Karan Ricardo MD,   05/01/25, 9:28 AM EDT.          reviewed    Results for orders placed during the hospital encounter of 04/22/24    Duplex Venous Lower Extremity - Left CAR    Interpretation Summary    Acute left lower extremity deep vein thrombosis noted in the proximal femoral, mid femoral, distal femoral and popliteal.    Sub-acute left lower extremity deep vein thrombosis noted in the gastrocnemius.    All other left sided veins appeared normal.      Results for orders placed during the hospital encounter of 04/30/25    ADULT TRANSTHORACIC ECHO COMPLETE W/ CONT IF NECESSARY PER PROTOCOL    Interpretation Summary    Left ventricular ejection fraction appears to be 56 - 60%.    Left ventricular diastolic function was normal.    Estimated right ventricular systolic pressure from tricuspid regurgitation is normal (<35 mmHg).    Electronically signed by Karan Ricardo MD, 05/01/25, 9:28 AM EDT.      Microbiology Results (last 10 days)        Procedure Component Value - Date/Time    Respiratory Panel PCR w/COVID-19(SARS-CoV-2) LEDY/BAL/YOEL/PAD/COR/LITA In-House, NP Swab in UTM/VTM, 2 HR TAT - Swab, Nasopharynx [[id number]]  (Normal) Collected: 04/30/25 1140    Lab Status: Final result Specimen: Swab from Nasopharynx Updated: 04/30/25 1232     ADENOVIRUS, PCR Not Detected     Coronavirus 229E Not Detected     Coronavirus HKU1 Not Detected     Coronavirus NL63 Not Detected     Coronavirus OC43 Not Detected     COVID19 Not Detected     Human Metapneumovirus Not Detected     Human Rhinovirus/Enterovirus Not Detected     Influenza A PCR Not Detected     Influenza B PCR Not Detected     Parainfluenza Virus 1 Not Detected     Parainfluenza Virus 2 Not Detected     Parainfluenza Virus 3 Not Detected     Parainfluenza Virus 4 Not Detected     RSV, PCR Not Detected     Bordetella pertussis pcr Not Detected     Bordetella parapertussis PCR Not Detected     Chlamydophila pneumoniae PCR Not Detected     Mycoplasma pneumo by PCR Not Detected    Narrative:      In the setting of a positive respiratory panel with a viral infection PLUS a negative procalcitonin without other underlying concern for bacterial infection, consider observing off antibiotics or discontinuation of antibiotics and continue supportive care. If the respiratory panel is positive for atypical bacterial infection (Bordetella pertussis, Chlamydophila pneumoniae, or Mycoplasma pneumoniae), consider antibiotic de-escalation to target atypical bacterial infection.            Assessment & Plan     Dyspnea on exertion     Dyspnea on exertion  - cath negative 2024  - echo unremarkable 2024  - cbc, bnp, ddimer, troponin unremarkable  - EKG rate 91 sinus  - rpp negative  - chest xray reviewed and showing no acute process  - cardiology consulted and recommends no further work up in patient  - will refer pt to sleep medicine for sleep apnea testing  - will refer to pulmonology for unexplained dyspnea    HX of  DVT  - pt had thrombectomy 2024  - on warfarin but not taking  - inr 1   - lovenox bridge until inr nearer to 3  - continue warfarin and recheck in 5 days      Hyperlipidemia  - pt on statin  - lipid panel   - , trig 440, HDL 39,   - restart vascepa and follow up with cardiology as outpt    Hx of  congenital solitary kidney  - creatinine near baseline       I discussed the patients findings and my recommendations with patient and nursing staff.     Discharge Diagnosis:      Dyspnea on exertion      Hospital Course  Patient is a 54 y.o. male presented with dyspnea on exertion. Er evaluated and admitted to observation. Labs including cbc, bnp, ddimer , troponin were normal. Creatinine is elevated but near pts baseline. EKG rate 91 sinus. Rpp negative. Chest xray is normal. Cardiology consulted and wants no further inpatient interventions. Pt follows up as outpt in 5 days. Will restart warfarin and pt will recheck at next visit on the 5th. Refer to pulmonary and sleep medicine for dyspnea. Discharge discussed with pt and he is agreeable to plan. Instructed pt to return to er if symptoms reoccur or worsen.      Past Medical History:     Past Medical History:   Diagnosis Date    Allergic     Arthritis     Chronic kidney disease 2010    DVT (deep venous thrombosis) 02/05/2024    GERD (gastroesophageal reflux disease)     Hyperlipidemia     Hypertension        Past Surgical History:     Past Surgical History:   Procedure Laterality Date    CARDIAC CATHETERIZATION N/A 02/05/2024    Procedure: Percutaneous Mechanical Thrombectomy;  Surgeon: Enrique To MD;  Location: [facility] CATH INVASIVE LOCATION;  Service: Cardiovascular;  Laterality: N/A;    CARDIAC CATHETERIZATION N/A 03/08/2024    Procedure: Percutaneous Mechanical Thrombectomy;  Surgeon: Enrique To MD;  Location: [facility] CATH INVASIVE LOCATION;  Service: Cardiovascular;  Laterality: N/A;    CARDIAC CATHETERIZATION Right 04/04/2024    Procedure: Left  Heart Cath with Coronary Angiography;  Surgeon: Enrique To MD;  Location: [facility] CATH INVASIVE LOCATION;  Service: Cardiovascular;  Laterality: Right;    COLONOSCOPY      KNEE SURGERY      NEPHRECTOMY      SHOULDER SURGERY      VEIN SURGERY         Social History:   Social History     Socioeconomic History    Marital status:    Tobacco Use    Smoking status: Never     Passive exposure: Never    Smokeless tobacco: Never   Vaping Use    Vaping status: Never Used   Substance and Sexual Activity    Alcohol use: Yes     Comment: Occasional    Drug use: Never    Sexual activity: Defer       Procedures Performed         Consults:   Consults       Date and Time Order Name Status Description    4/30/2025  1:50 PM Cardiology (on-call MD unless specified) Completed             Condition on Discharge:     Stable    Discharge Disposition      Discharge Medications     Discharge Medications        ASK your doctor about these medications        Instructions Start Date   acetaminophen 325 MG tablet  Commonly known as: TYLENOL   650 mg, Every 6 Hours PRN      allopurinol 300 MG tablet  Commonly known as: ZYLOPRIM   300 mg, Daily      amLODIPine 10 MG tablet  Commonly known as: NORVASC   1 tablet, Daily      carvedilol 25 MG tablet  Commonly known as: COREG   25 mg, Oral, 2 Times Daily With Meals      chlorthalidone 25 MG tablet  Commonly known as: HYGROTON   25 mg, Oral, Daily, NEEDS AN APPOINTMENT FOR FURTHER REFILLS.      cloNIDine 0.2 MG tablet  Commonly known as: CATAPRES   0.2 mg, 2 Times Daily      diclofenac 50 MG EC tablet  Commonly known as: VOLTAREN   50 mg, 2 Times Daily PRN      divalproex 500 MG 24 hr tablet  Commonly known as: DEPAKOTE ER   500 mg, 2 Times Daily      hydrALAZINE 100 MG tablet  Commonly known as: APRESOLINE   100 mg, 3 Times Daily      icosapent ethyl 1 g capsule capsule  Commonly known as: VASCEPA   2 g, Oral, 2 Times Daily With Meals      multivitamin with minerals tablet tablet   1 tablet,  Daily      omeprazole 20 MG capsule  Commonly known as: priLOSEC   20 mg, 2 Times Daily      rosuvastatin 20 MG tablet  Commonly known as: CRESTOR   20 mg, Oral, Daily      VITAMIN D-3 PO   1 capsule, Daily      warfarin 5 MG tablet  Commonly known as: COUMADIN  Ask about: Which instructions should I use?   5 mg, Daily               Discharge Diet:     Activity at Discharge:     Follow-up Appointments  Future Appointments   Date Time Provider Department Center   5/5/2025  9:15 AM [facility]   5/5/2025  9:30 AM Mery Penaloza APRN [facility]         Test Results Pending at Discharge  Pending Results       None             Risk for Readmission (LACE) Score: 2 (5/1/2025  6:00 AM)      Less Than 30 minutes spent in discharge activities for this patient    Signature:Electronically signed by America Kaur PA-C, 05/01/25, 12:03 PM EDT.

## 2025-05-01 NOTE — PLAN OF CARE
Goal Outcome Evaluation:  Plan of Care Reviewed With: patient        Progress: improving  Outcome Evaluation: Pt has remained stable this shift with no c/o SOA or chest pain this shift. Cardiology consulted and cleared pt to dsicharge home and follow up outpatient.

## 2025-05-01 NOTE — PROGRESS NOTES
"Pharmacy dosing service  Anticoagulant  Warfarin     Subjective:    Spencer Brothers is a 54 y.o.male being continued on warfarin for History of DVT/PE.    INR Goal:  2.5-3  Home medication?: warfarin 5 mg PO daily, except warfarin 2.5 mg PO on MWF per the last Anticoag visit note 6/21/2024, patient also confirmed this.   Bridge Therapy Present?:  Yes, Enoxaparin 80 mg SQ Q12H  Interacting Medications Evaluation (New/Present/Discontinued):   Allopurinol (pta): may enhance the anticoagulant effect of warfarin  Rosuvastatin (pta): may enhance the anticoagulant effect of warfarin  Chlorthalidone (pta): may diminish the anticoagulant effect of warfarin  Pantoprazole (pta, omeprazole): may enhance the anticoagulant effect of warfarin  Additional Contributing Factors:   Patient very non-compliant with home meds.   ~100% intake of documented meals in last 24 hours.      Assessment/Plan:    Cardiology aims for an INR goal of 2.5-3 (\"closer to 3 if possible, due to burden of thrombus\")  Patient's INR is SUBtherapeutic today at 1.15, a slight increase from 1.06 yesterday.    Will give a slightly boosted dose of warfarin 7.5 mg today with the tentative plan to resume home regimen tomorrow.  Daily PT/INR ordered.    Continue to monitor and adjust based on INR.         Date 4/30 5/1          INR 1.06 1.15          Dose 5 mg 7.5 mg            Diet Order   Procedures    Diet: Cardiac; Healthy Heart (2-3 Na+); Fluid Consistency: Thin (IDDSI 0)      Objective:  [Ht: 172.7 cm (68\"); Wt: 80 kg (176 lb 5.9 oz); BMI: Body mass index is 26.82 kg/m².]    Lab Results   Component Value Date    ALBUMIN 4.8 04/30/2025     Lab Results   Component Value Date    INR 1.15 (L) 05/01/2025    INR 1.06 04/30/2025    INR 2.30 (A) 06/21/2024    PROTIME 14.7 (L) 05/01/2025    PROTIME 13.7 04/30/2025    PROTIME 29.1 (H) 04/04/2024     Lab Results   Component Value Date    HGB 14.0 05/01/2025    HGB 15.1 04/30/2025    HGB 16.5 04/12/2024     Lab Results "   Component Value Date    HCT 41.7 05/01/2025    HCT 43.4 04/30/2025    HCT 49.5 04/12/2024     Toño Gann, [facility]  05/01/25 11:10 EDT

## 2025-05-01 NOTE — PLAN OF CARE
Problem: Adult Inpatient Plan of Care  Goal: Absence of Hospital-Acquired Illness or Injury  Intervention: Identify and Manage Fall Risk  Recent Flowsheet Documentation  Taken 5/1/2025 0300 by Aishwarya Aranda RN  Safety Promotion/Fall Prevention: safety round/check completed  Taken 5/1/2025 0200 by Aishwarya Aranda RN  Safety Promotion/Fall Prevention: safety round/check completed  Taken 5/1/2025 0100 by Aishwarya Aranda RN  Safety Promotion/Fall Prevention: safety round/check completed  Taken 5/1/2025 0000 by Aishwarya Aranda RN  Safety Promotion/Fall Prevention: safety round/check completed  Taken 4/30/2025 2300 by Aishwarya Aranda RN  Safety Promotion/Fall Prevention: safety round/check completed  Taken 4/30/2025 2200 by Aishwarya Aranda RN  Safety Promotion/Fall Prevention: safety round/check completed  Taken 4/30/2025 2100 by Aishwarya Aranda RN  Safety Promotion/Fall Prevention: safety round/check completed  Taken 4/30/2025 2000 by Aishwarya Aranda RN  Safety Promotion/Fall Prevention: safety round/check completed  Taken 4/30/2025 1911 by Aishwarya Aranda RN  Safety Promotion/Fall Prevention: safety round/check completed  Intervention: Prevent Skin Injury  Recent Flowsheet Documentation  Taken 4/30/2025 1911 by Aishwarya Aranda RN  Body Position: position changed independently  Goal: Optimal Comfort and Wellbeing  Intervention: Provide Person-Centered Care  Recent Flowsheet Documentation  Taken 4/30/2025 1911 by Aishwarya Aranda RN  Trust Relationship/Rapport:   care explained   choices provided   emotional support provided   empathic listening provided   questions answered   questions encouraged   reassurance provided   thoughts/feelings acknowledged     Problem: Comorbidity Management  Goal: Blood Pressure in Desired Range  Intervention: Maintain Blood Pressure Management  Recent Flowsheet Documentation  Taken 4/30/2025 1911 by Aishwarya Aranda RN  Medication Review/Management: medications  reviewed   Goal Outcome Evaluation:      Cardio consult and echo 5/1

## 2025-05-01 NOTE — TELEPHONE ENCOUNTER
Clarified with Dr. To. He wants the patient's INR 2.5-3.0, but prefer closer to 3.0. He sent a message to the pharmacist.

## 2025-05-05 ENCOUNTER — ANTICOAGULATION VISIT (OUTPATIENT)
Dept: CARDIOLOGY | Facility: CLINIC | Age: 55
End: 2025-05-05
Payer: COMMERCIAL

## 2025-05-05 ENCOUNTER — PRIOR AUTHORIZATION (OUTPATIENT)
Dept: CARDIOLOGY | Facility: CLINIC | Age: 55
End: 2025-05-05

## 2025-05-05 ENCOUNTER — OFFICE VISIT (OUTPATIENT)
Dept: CARDIOLOGY | Facility: CLINIC | Age: 55
End: 2025-05-05
Payer: COMMERCIAL

## 2025-05-05 VITALS
HEART RATE: 77 BPM | BODY MASS INDEX: 26.56 KG/M2 | DIASTOLIC BLOOD PRESSURE: 77 MMHG | SYSTOLIC BLOOD PRESSURE: 106 MMHG | HEIGHT: 68 IN | OXYGEN SATURATION: 97 % | WEIGHT: 175.23 LBS

## 2025-05-05 DIAGNOSIS — E78.2 MIXED HYPERLIPIDEMIA: Chronic | ICD-10-CM

## 2025-05-05 DIAGNOSIS — I82.402 RECURRENT ACUTE DEEP VEIN THROMBOSIS (DVT) OF LEFT LOWER EXTREMITY: ICD-10-CM

## 2025-05-05 DIAGNOSIS — I10 ESSENTIAL HYPERTENSION: Chronic | ICD-10-CM

## 2025-05-05 DIAGNOSIS — R06.09 DYSPNEA ON EXERTION: Primary | ICD-10-CM

## 2025-05-05 DIAGNOSIS — Z79.01 LONG TERM (CURRENT) USE OF ANTICOAGULANTS: ICD-10-CM

## 2025-05-05 DIAGNOSIS — I82.412 ACUTE DEEP VEIN THROMBOSIS (DVT) OF FEMORAL VEIN OF LEFT LOWER EXTREMITY: Primary | ICD-10-CM

## 2025-05-05 PROBLEM — S86.892S: Status: ACTIVE | Noted: 2021-08-05

## 2025-05-05 PROBLEM — G43.719 INTRACTABLE CHRONIC MIGRAINE WITHOUT AURA: Status: ACTIVE | Noted: 2020-08-06

## 2025-05-05 PROBLEM — S83.282A ACUTE LATERAL MENISCUS TEAR OF LEFT KNEE: Status: ACTIVE | Noted: 2023-06-22

## 2025-05-05 PROBLEM — Z98.890 S/P LEFT KNEE ARTHROSCOPY: Status: ACTIVE | Noted: 2021-09-14

## 2025-05-05 PROBLEM — R07.9 ACUTE CHEST PAIN: Status: RESOLVED | Noted: 2024-02-16 | Resolved: 2025-05-05

## 2025-05-05 PROBLEM — S83.242A ACUTE MEDIAL MENISCAL TEAR, LEFT, INITIAL ENCOUNTER: Status: ACTIVE | Noted: 2021-08-05

## 2025-05-05 PROBLEM — I82.409 DVT (DEEP VENOUS THROMBOSIS): Status: RESOLVED | Noted: 2024-02-05 | Resolved: 2025-05-05

## 2025-05-05 LAB — INR PPP: 2.2 (ref 0.9–1.1)

## 2025-05-05 PROCEDURE — 85610 PROTHROMBIN TIME: CPT | Performed by: INTERNAL MEDICINE

## 2025-05-05 PROCEDURE — 36416 COLLJ CAPILLARY BLOOD SPEC: CPT | Performed by: INTERNAL MEDICINE

## 2025-05-05 PROCEDURE — 99214 OFFICE O/P EST MOD 30 MIN: CPT | Performed by: NURSE PRACTITIONER

## 2025-05-05 RX ORDER — ICOSAPENT ETHYL 1 G/1
2 CAPSULE ORAL 2 TIMES DAILY WITH MEALS
Qty: 360 CAPSULE | Refills: 3 | Status: SHIPPED | OUTPATIENT
Start: 2025-05-05

## 2025-05-05 RX ORDER — CLONIDINE HYDROCHLORIDE 0.2 MG/1
0.2 TABLET ORAL 2 TIMES DAILY
Qty: 180 TABLET | Refills: 3 | Status: SHIPPED | OUTPATIENT
Start: 2025-05-05

## 2025-05-05 RX ORDER — CHLORTHALIDONE 25 MG/1
25 TABLET ORAL DAILY
Qty: 90 TABLET | Refills: 3 | Status: SHIPPED | OUTPATIENT
Start: 2025-05-05

## 2025-05-05 RX ORDER — AMLODIPINE BESYLATE 10 MG/1
10 TABLET ORAL DAILY
Qty: 90 TABLET | Refills: 3 | Status: SHIPPED | OUTPATIENT
Start: 2025-05-05

## 2025-05-05 RX ORDER — CARVEDILOL 25 MG/1
25 TABLET ORAL 2 TIMES DAILY WITH MEALS
Qty: 180 TABLET | Refills: 3 | Status: SHIPPED | OUTPATIENT
Start: 2025-05-05 | End: 2026-04-30

## 2025-05-05 RX ORDER — ROSUVASTATIN CALCIUM 20 MG/1
20 TABLET, COATED ORAL DAILY
Qty: 90 TABLET | Refills: 3 | Status: SHIPPED | OUTPATIENT
Start: 2025-05-05

## 2025-05-05 NOTE — PROGRESS NOTES
Patient's INR- 2.2. within therapeutic range. Continue current dosage and recheck in 1 week/ dvLPN

## 2025-05-05 NOTE — TELEPHONE ENCOUNTER
PA Case ID #: [id number]  Rx #: [id number]  Need Help? Call us at [phone]  Outcome  Approved today by [employer] 2017  PA Case: [id number], Status: Approved, Coverage Starts on: 5/5/2025 12:00:00 AM, Coverage Ends on: 5/5/2026 12:00:00 AM.  Effective Date: 5/5/2025  Authorization Expiration Date: 5/5/2026  Drug  Icosapent Ethyl 1GM capsules  ePA cloud logo  Form  [employer] PA Form (2017 [employer])  Original Claim Info  76,03

## 2025-05-05 NOTE — TELEPHONE ENCOUNTER
PA Case ID #: [id number]  Rx #: [id number]  Need Help? Call us at [phone]  Status  sent iconSent to Plan today  Drug  Icosapent Ethyl 1GM capsules  ePA cloud logo  Form  Anna [employer] Electronic PA Form (2017 [employer])  Original Claim Info  76,75

## 2025-05-05 NOTE — PATIENT INSTRUCTIONS
Start checking your blood pressure once daily. Check it 1 hour after you take your morning medications. Keep a log and call us after 1-2 weeks to report your log, or send a message thru [employer].

## 2025-05-12 ENCOUNTER — ANTICOAGULATION VISIT (OUTPATIENT)
Dept: CARDIOLOGY | Facility: CLINIC | Age: 55
End: 2025-05-12
Payer: COMMERCIAL

## 2025-05-12 VITALS
WEIGHT: 177 LBS | SYSTOLIC BLOOD PRESSURE: 122 MMHG | BODY MASS INDEX: 26.91 KG/M2 | DIASTOLIC BLOOD PRESSURE: 87 MMHG | HEART RATE: 77 BPM

## 2025-05-12 DIAGNOSIS — Z79.01 LONG TERM (CURRENT) USE OF ANTICOAGULANTS: Primary | ICD-10-CM

## 2025-05-12 LAB — INR PPP: 5.1 (ref 0.9–1.1)

## 2025-05-12 PROCEDURE — 36416 COLLJ CAPILLARY BLOOD SPEC: CPT | Performed by: INTERNAL MEDICINE

## 2025-05-12 PROCEDURE — 85610 PROTHROMBIN TIME: CPT | Performed by: INTERNAL MEDICINE

## 2025-05-12 NOTE — PROGRESS NOTES
Patient's INR- 5.1, outside of therapeutic range. This Sn instructed patient to hold x 2 days and then resume current dosage. Recheck in1 month. dvLPN

## 2025-06-06 ENCOUNTER — TELEPHONE (OUTPATIENT)
Dept: CARDIOLOGY | Facility: CLINIC | Age: 55
End: 2025-06-06
Payer: COMMERCIAL

## 2025-06-06 NOTE — TELEPHONE ENCOUNTER
PT CALLING TO INFORM US THAT HE STILL HASN'T GOTTEN HIS WARFARIN YET FROM THE PHARMACY. HE HAS AN APPT ON 06/09/25 BUT WANTED TO CANCEL IT DUE TO NOT HAVE BEEN TAKEN HIS MEDICATION FOR ALMOST 3 WEEKS NOW. CAN YOU CALL HIM AND LET ME KNOW WHEN WE NEED TO RESCHEDULE HIM BACK IN FOR A FOLLOW-UP?

## 2025-06-06 NOTE — TELEPHONE ENCOUNTER
Called pt and left voicemail that I would cancel appt on 6/9/25. He can call and reschedule INR after he resumes Warfarin for about a week. Shay

## 2025-06-12 DIAGNOSIS — Z79.01 LONG TERM (CURRENT) USE OF ANTICOAGULANTS: Primary | ICD-10-CM

## 2025-06-12 DIAGNOSIS — I48.11 LONGSTANDING PERSISTENT ATRIAL FIBRILLATION: ICD-10-CM

## 2025-06-12 RX ORDER — WARFARIN SODIUM 5 MG/1
TABLET ORAL
Qty: 60 TABLET | Refills: 1 | Status: SHIPPED | OUTPATIENT
Start: 2025-06-12 | End: 2025-06-12

## 2025-06-12 RX ORDER — WARFARIN SODIUM 5 MG/1
5 TABLET ORAL NIGHTLY
Qty: 60 TABLET | Refills: 1 | Status: SHIPPED | OUTPATIENT
Start: 2025-06-12

## 2025-06-12 NOTE — TELEPHONE ENCOUNTER
Warfarin 5 mg, 1 tab on Mon, Wed & Friday. 2.5mg all other days. Script sent to [facility] for refill. dvLPN

## 2025-06-12 NOTE — TELEPHONE ENCOUNTER
We received a fax from [facility] stating they need the directions for patient's warfarin. It was not on the prescription that was sent over.

## 2025-06-12 NOTE — TELEPHONE ENCOUNTER
Rx Refill Note  Requested Prescriptions     Pending Prescriptions Disp Refills    warfarin (COUMADIN) 7.5 MG tablet 20 tablet 0     Sig: Indications: Atrial Fibrillation, history of DVT/PE      Last office visit with prescribing clinician: 3/18/2024   Last telemedicine visit with prescribing clinician: Visit date not found   Next office visit with prescribing clinician: 11/14/2025                         Would you like a call back once the refill request has been completed: [] Yes [] No    If the office needs to give you a call back, can they leave a voicemail: [] Yes [] No    Jacque Lema MA  06/12/25, 11:23 EDT

## 2025-07-01 ENCOUNTER — ANTICOAGULATION VISIT (OUTPATIENT)
Dept: CARDIOLOGY | Facility: CLINIC | Age: 55
End: 2025-07-01
Payer: COMMERCIAL

## 2025-07-01 VITALS
HEART RATE: 72 BPM | WEIGHT: 177 LBS | DIASTOLIC BLOOD PRESSURE: 82 MMHG | BODY MASS INDEX: 26.91 KG/M2 | SYSTOLIC BLOOD PRESSURE: 119 MMHG

## 2025-07-01 DIAGNOSIS — Z79.01 LONG TERM (CURRENT) USE OF ANTICOAGULANTS: Primary | ICD-10-CM

## 2025-07-01 LAB — INR PPP: 1.4 (ref 0.9–1.1)

## 2025-07-01 PROCEDURE — 36416 COLLJ CAPILLARY BLOOD SPEC: CPT | Performed by: INTERNAL MEDICINE

## 2025-07-01 PROCEDURE — 85610 PROTHROMBIN TIME: CPT | Performed by: INTERNAL MEDICINE

## 2025-07-01 NOTE — PROGRESS NOTES
Patient's INR- 1.4, outside of therapeutic range. This Sn instructed patient to take 7.5mg this evening and then resume current dosing. Recheck in 1 month. AliaN

## 2025-07-18 ENCOUNTER — TELEPHONE (OUTPATIENT)
Dept: CARDIOLOGY | Facility: CLINIC | Age: 55
End: 2025-07-18
Payer: COMMERCIAL

## 2025-07-18 NOTE — TELEPHONE ENCOUNTER
Called patient to reschedule his 8/1 INR.  He wanted to let nurse know he started antibiotics yesterday for sinus infection.

## 2025-07-18 NOTE — TELEPHONE ENCOUNTER
We probably need to check him next Tuesday, if he is available, since he started an antibiotic. Thanks Shay

## 2025-07-22 ENCOUNTER — ANTICOAGULATION VISIT (OUTPATIENT)
Dept: CARDIOLOGY | Facility: CLINIC | Age: 55
End: 2025-07-22
Payer: COMMERCIAL

## 2025-07-22 VITALS
WEIGHT: 176 LBS | DIASTOLIC BLOOD PRESSURE: 79 MMHG | HEART RATE: 75 BPM | BODY MASS INDEX: 26.76 KG/M2 | SYSTOLIC BLOOD PRESSURE: 120 MMHG

## 2025-07-22 DIAGNOSIS — Z79.01 LONG TERM (CURRENT) USE OF ANTICOAGULANTS: Primary | ICD-10-CM

## 2025-07-22 LAB — INR PPP: 1.3 (ref 0.9–1.1)

## 2025-07-22 PROCEDURE — 36416 COLLJ CAPILLARY BLOOD SPEC: CPT | Performed by: INTERNAL MEDICINE

## 2025-07-22 PROCEDURE — 85610 PROTHROMBIN TIME: CPT | Performed by: INTERNAL MEDICINE

## 2025-07-22 NOTE — PROGRESS NOTES
Pt has been on two rounds of antibiotics in the last few weeks. INR today was low at 1.3. Had actually been taking a lower dose of Warfarin than we have documented. He will start taking 5 mgs, 5 days per week with 2.5 mgs on Mon and Fri. Rechefck INR in 2 weeks. Shay

## 2025-07-30 ENCOUNTER — OFFICE VISIT (OUTPATIENT)
Dept: PULMONOLOGY | Facility: HOSPITAL | Age: 55
End: 2025-07-30
Payer: COMMERCIAL

## 2025-07-30 VITALS
OXYGEN SATURATION: 99 % | HEIGHT: 68 IN | BODY MASS INDEX: 26.67 KG/M2 | WEIGHT: 176 LBS | SYSTOLIC BLOOD PRESSURE: 104 MMHG | HEART RATE: 74 BPM | DIASTOLIC BLOOD PRESSURE: 70 MMHG | RESPIRATION RATE: 14 BRPM

## 2025-07-30 DIAGNOSIS — I10 ESSENTIAL HYPERTENSION: ICD-10-CM

## 2025-07-30 DIAGNOSIS — R06.02 SHORTNESS OF BREATH ON EXERTION: Primary | ICD-10-CM

## 2025-07-30 DIAGNOSIS — G47.10 HYPERSOMNOLENCE: ICD-10-CM

## 2025-07-30 DIAGNOSIS — R06.83 SNORING: ICD-10-CM

## 2025-07-30 DIAGNOSIS — Z86.718 HISTORY OF DVT (DEEP VEIN THROMBOSIS): ICD-10-CM

## 2025-07-30 DIAGNOSIS — E78.5 DYSLIPIDEMIA: ICD-10-CM

## 2025-07-30 PROCEDURE — G0463 HOSPITAL OUTPT CLINIC VISIT: HCPCS

## 2025-07-30 RX ORDER — OMEPRAZOLE 40 MG/1
CAPSULE, DELAYED RELEASE ORAL
COMMUNITY
Start: 2025-07-16

## 2025-07-30 NOTE — PROGRESS NOTES
HPI:  55 y.o.  Male patient here for pulmonary evaluation.  Complain of shortness of breath mainly on exertion going up stairs, started in May.  He reports chronic sinus allergies and postnasal drainage  Did not smoke but he grew up in a household where the mother smoked until his age 19  Works in an apartment complex maintenance    Past Medical History:   Diagnosis Date    Allergic     Arthritis     Chronic kidney disease 2010    DVT (deep venous thrombosis) 02/05/2024    GERD (gastroesophageal reflux disease)     Hyperlipidemia     Hypertension         Current Outpatient Medications on File Prior to Visit   Medication Sig Dispense Refill    acetaminophen (TYLENOL) 325 MG tablet Take 2 tablets by mouth Every 6 (Six) Hours As Needed for Mild Pain.      allopurinol (ZYLOPRIM) 300 MG tablet Take 1 tablet by mouth Daily.      amLODIPine (NORVASC) 10 MG tablet Take 1 tablet by mouth Daily. 90 tablet 3    carvedilol (COREG) 25 MG tablet Take 1 tablet by mouth 2 (Two) Times a Day With Meals for 360 days. 180 tablet 3    chlorthalidone (HYGROTON) 25 MG tablet Take 1 tablet by mouth Daily. 90 tablet 3    Cholecalciferol (VITAMIN D-3 PO) Take 1 capsule by mouth Daily.      cloNIDine (CATAPRES) 0.2 MG tablet Take 1 tablet by mouth 2 (Two) Times a Day. 180 tablet 3    icosapent ethyl (VASCEPA) 1 g capsule capsule Take 2 g by mouth 2 (Two) Times a Day With Meals. 360 capsule 3    multivitamin with minerals tablet tablet Take 1 tablet by mouth Daily.      omeprazole (priLOSEC) 40 MG capsule TAKE 1 CAPSULE BY MOUTH 30 MINUTES TO ONE HOUR BEFORE MORNING MEAL ONCE DAILY      rosuvastatin (CRESTOR) 20 MG tablet Take 1 tablet by mouth Daily. 90 tablet 3    warfarin (COUMADIN) 5 MG tablet Take 1 tablet by mouth Every Night. Warfarin 5 mg, 1 tab on Mon, Wed & Friday. 2.5 mg=1/2 Tab  all other days  Indications: Atrial Fibrillation, history of DVT/PE 60 tablet 1    [DISCONTINUED] omeprazole (priLOSEC) 20 MG capsule Take 1 capsule by  "mouth 2 (Two) Times a Day.       No current facility-administered medications on file prior to visit.        Social History     Tobacco Use    Smoking status: Never     Passive exposure: Never    Smokeless tobacco: Never   Vaping Use    Vaping status: Never Used   Substance Use Topics    Alcohol use: Yes     Comment: Occasional    Drug use: Never        Family History   Problem Relation Age of Onset    Heart disease Mother     Heart attack Mother 53    Breast cancer Mother 50    Heart disease Maternal Uncle     Heart attack Maternal Uncle     Diabetes Maternal Uncle         Review of system:  Constitutional: Negative for chills, fever and malaise/fatigue.   HENT: Chronic sinus congestion postnasal drainage  Eyes: Negative.    Cardiovascular: Negative.    Respiratory: Exertional shortness of breath.  Snoring, interrupted sleep and excessive daytime sleepiness  Skin: Negative.    Musculoskeletal: Negative.    Gastrointestinal: Negative.    Genitourinary: Negative.    Neurological: Negative.    Psychiatric/Behavioral: Negative.    Physical exam:  Blood pressure 104/70, pulse 74, resp. rate 14, height 172.7 cm (68\"), weight 79.8 kg (176 lb), SpO2 99%.      General Appearance:  Alert   HEENT:  Normocephalic, without obvious abnormality, Conjunctiva/corneas clear,.   Nares normal, no drainage     Neck:  Supple, symmetrical, trachea midline. No JVD.  Lungs /Chest wall:   good air entry Bilaterlly, respirations unlabored, symmetrical wall movement.     Heart:  Regular rate and rhythm, S1 S2 normal  Abdomen: Soft, non-tender, no masses, no organomegaly.    Extremities: No edema, no clubbing or cyanosis    XR Chest 1 View  Result Date: 4/30/2025  Impression: No acute process. Electronically Signed: Noah Claros MD  4/30/2025 11:58 AM EDT  Workstation ID: [id number]     Results for orders placed during the hospital encounter of 04/30/25    ADULT TRANSTHORACIC ECHO COMPLETE W/ CONT IF NECESSARY PER PROTOCOL 05/01/2025  9:29 " AM    Interpretation Summary    Left ventricular ejection fraction appears to be 56 - 60%.    Left ventricular diastolic function was normal.    Estimated right ventricular systolic pressure from tricuspid regurgitation is normal (<35 mmHg).    Electronically signed by Karan Ricardo MD, 05/01/25, 9:28 AM EDT.        Assessment :  Dyspnea on exertion  Order PFTs  Previous nuclear stress test was abnormal.  Cardiac catheterization in 2024 with no evidence of obstructive coronary disease  Echocardiogram 4/30/2025 shows normal LV function, normal diastolic function and no significant valvular abnormalities.  No evidence of pulmonary hypertension.  VQ scan February 2024 normal  CT scan of the chest February 2024 normal    Non-smoker but grew up with mother smoking in the house until he was 19    History of WALLY:   Previously could not tolerate CPAP, he still having snoring, interrupted sleep and excessive daytime sleepiness  Check HST and if positive order new CPAP machine with nasal pillows  SAFETY DRIVING  ADEQUATE SLEEP HYGEINE  DISCUSSED CARDIOVASCULAR & METABOLIC SIDE EFFECTS OF UNTREATED WALLY         History of DVT  Extensive DVT of the left leg  He is status post partially successful mechanical thrombectomy on 2/5/2024  Repeat attempted mechanical thrombectomy was aborted in March 2024  Switched from Eliquis to warfarin: Goal INR 3.    Encouraged ambulation and leg exercises.     Essential hypertension  Continue amlodipine, Coreg, chlorthalidone, clonidine.  Previously on hydralazine  Blood pressure and heart rate are normal     Hyperlipidemia  Continue statin  , HDL 39, triglycerides 440 and total cholesterol 357.  Talhacepa       Solitary kidney  Patient was born with a solitary kidney  Creatinine 1.3, GFR is 66.9  Closely monitor renal function while on diuretics     Gout  Continue allopurinol    I independently reviewed radiological images    Patient is advised to stay up-to-date on  immunizations

## 2025-08-06 ENCOUNTER — ANTICOAGULATION VISIT (OUTPATIENT)
Dept: CARDIOLOGY | Facility: CLINIC | Age: 55
End: 2025-08-06
Payer: COMMERCIAL

## 2025-08-06 VITALS
HEART RATE: 75 BPM | BODY MASS INDEX: 27.22 KG/M2 | DIASTOLIC BLOOD PRESSURE: 79 MMHG | SYSTOLIC BLOOD PRESSURE: 114 MMHG | WEIGHT: 179 LBS

## 2025-08-06 DIAGNOSIS — Z79.01 LONG TERM (CURRENT) USE OF ANTICOAGULANTS: Primary | ICD-10-CM

## 2025-08-06 LAB — INR PPP: 1.3 (ref 2.5–3.5)

## 2025-08-06 PROCEDURE — 36416 COLLJ CAPILLARY BLOOD SPEC: CPT | Performed by: INTERNAL MEDICINE

## 2025-08-06 PROCEDURE — 85610 PROTHROMBIN TIME: CPT | Performed by: INTERNAL MEDICINE

## 2025-08-07 DIAGNOSIS — Z79.01 LONG TERM (CURRENT) USE OF ANTICOAGULANTS: ICD-10-CM

## 2025-08-07 DIAGNOSIS — I48.11 LONGSTANDING PERSISTENT ATRIAL FIBRILLATION: ICD-10-CM

## 2025-08-07 RX ORDER — WARFARIN SODIUM 5 MG/1
TABLET ORAL
Qty: 90 TABLET | Refills: 0 | Status: SHIPPED | OUTPATIENT
Start: 2025-08-07

## 2025-08-11 DIAGNOSIS — G47.33 OSA (OBSTRUCTIVE SLEEP APNEA): Primary | ICD-10-CM

## 2025-08-14 ENCOUNTER — HOSPITAL ENCOUNTER (OUTPATIENT)
Dept: SLEEP MEDICINE | Facility: HOSPITAL | Age: 55
Discharge: HOME | End: 2025-08-14
Admitting: INTERNAL MEDICINE
Payer: COMMERCIAL

## 2025-08-14 DIAGNOSIS — G47.10 HYPERSOMNOLENCE: ICD-10-CM

## 2025-08-14 DIAGNOSIS — R06.83 SNORING: ICD-10-CM

## 2025-08-14 PROCEDURE — G0399 HOME SLEEP TEST/TYPE 3 PORTA: HCPCS

## 2025-08-20 ENCOUNTER — ANTICOAGULATION VISIT (OUTPATIENT)
Dept: CARDIOLOGY | Facility: CLINIC | Age: 55
End: 2025-08-20
Payer: COMMERCIAL

## 2025-08-20 VITALS
BODY MASS INDEX: 27.06 KG/M2 | HEART RATE: 68 BPM | DIASTOLIC BLOOD PRESSURE: 80 MMHG | SYSTOLIC BLOOD PRESSURE: 117 MMHG | WEIGHT: 178 LBS

## 2025-08-20 DIAGNOSIS — Z79.01 LONG TERM (CURRENT) USE OF ANTICOAGULANTS: Primary | ICD-10-CM

## 2025-08-20 LAB — INR PPP: 1.8 (ref 2–3)

## 2025-08-20 PROCEDURE — 36416 COLLJ CAPILLARY BLOOD SPEC: CPT | Performed by: INTERNAL MEDICINE

## 2025-08-20 PROCEDURE — 85610 PROTHROMBIN TIME: CPT | Performed by: INTERNAL MEDICINE

## 2025-08-23 ENCOUNTER — HOSPITAL ENCOUNTER (EMERGENCY)
Facility: HOSPITAL | Age: 55
Discharge: HOME | End: 2025-08-23
Payer: COMMERCIAL

## 2025-08-23 ENCOUNTER — APPOINTMENT (OUTPATIENT)
Dept: GENERAL RADIOLOGY | Facility: HOSPITAL | Age: 55
End: 2025-08-23
Payer: COMMERCIAL

## 2025-08-24 DIAGNOSIS — G47.33 OSA (OBSTRUCTIVE SLEEP APNEA): Primary | ICD-10-CM

## (undated) DEVICE — DGW .035 FC J3MM 260CM TEF: Brand: EMERALD

## (undated) DEVICE — PK TRY HEART CATH 50

## (undated) DEVICE — CVR PROB ULTRASND CIVFLEX GEN/PURP TELESCP/FOLD 5.5X96IN LF

## (undated) DEVICE — Device

## (undated) DEVICE — NAVICROSS SUPPORT CATHETER: Brand: NAVICROSS

## (undated) DEVICE — NDL PERC 1PRT THNWALL W/BASEPLT 18G 7CM

## (undated) DEVICE — ST ACC MICROPUNCTURE STFF/CANN PLAT/TP 4F 21G 40CM

## (undated) DEVICE — PINNACLE INTRODUCER SHEATH: Brand: PINNACLE

## (undated) DEVICE — SYR LL TP 10ML STRL

## (undated) DEVICE — CATH DIAG IMPULSE FR4 6F 100CM

## (undated) DEVICE — CATH DIAG IMPULSE MPA 6F 100CM

## (undated) DEVICE — CATH IMG IVUS VISIONS .035 DIG 8.2F

## (undated) DEVICE — KT INTRO MINISTICK MAX W/GW NITNL/TUNG ECHO STFF 4F 21G 7CM

## (undated) DEVICE — STPCK 3WY HP ROT

## (undated) DEVICE — TR BAND RADIAL ARTERY COMPRESSION DEVICE: Brand: TR BAND

## (undated) DEVICE — GLIDESHEATH SLENDER STAINLESS STEEL KIT: Brand: GLIDESHEATH SLENDER

## (undated) DEVICE — GW GLIDEWIRE ADVANTAGE ANG .035IN 260X235X25CM

## (undated) DEVICE — GW PTFE EMERALD HEPCOAT FC J TIP STD .035 3MM 150CM

## (undated) DEVICE — SHEATH INTRO PINN CORNRY .038 7F10CM

## (undated) DEVICE — RADIFOCUS GLIDEWIRE ADVANTAGE GUIDEWIRE: Brand: GLIDEWIRE ADVANTAGE

## (undated) DEVICE — ELECTRD DEFIB M/FUNC PROPADZ RADIOL 2PK

## (undated) DEVICE — CATH DIAG IMPULSE FL3.5 6F 100CM